# Patient Record
Sex: FEMALE | Race: WHITE | Employment: FULL TIME | URBAN - METROPOLITAN AREA
[De-identification: names, ages, dates, MRNs, and addresses within clinical notes are randomized per-mention and may not be internally consistent; named-entity substitution may affect disease eponyms.]

---

## 2017-02-07 ENCOUNTER — HOSPITAL ENCOUNTER (EMERGENCY)
Facility: CLINIC | Age: 32
Discharge: HOME | End: 2017-02-07
Attending: EMERGENCY MEDICINE | Admitting: EMERGENCY MEDICINE

## 2017-02-07 VITALS
OXYGEN SATURATION: 99 % | DIASTOLIC BLOOD PRESSURE: 60 MMHG | TEMPERATURE: 97.4 F | SYSTOLIC BLOOD PRESSURE: 101 MMHG | RESPIRATION RATE: 16 BRPM | HEART RATE: 59 BPM

## 2017-02-07 DIAGNOSIS — N30.01 ACUTE CYSTITIS WITH HEMATURIA: ICD-10-CM

## 2017-02-07 LAB
ALBUMIN UR-MCNC: 100 MG/DL
APPEARANCE UR: ABNORMAL
BACTERIA #/AREA URNS HPF: ABNORMAL /HPF
BILIRUB UR QL STRIP: NEGATIVE
COLOR UR AUTO: YELLOW
GLUCOSE UR STRIP-MCNC: NEGATIVE MG/DL
HCG UR QL: NEGATIVE
HGB UR QL STRIP: ABNORMAL
KETONES UR STRIP-MCNC: NEGATIVE MG/DL
LEUKOCYTE ESTERASE UR QL STRIP: ABNORMAL
MUCOUS THREADS #/AREA URNS LPF: PRESENT /LPF
NITRATE UR QL: NEGATIVE
PH UR STRIP: 6 PH (ref 5–7)
RBC #/AREA URNS AUTO: >182 /HPF (ref 0–2)
SP GR UR STRIP: 1.03 (ref 1–1.03)
SQUAMOUS #/AREA URNS AUTO: 13 /HPF (ref 0–1)
URN SPEC COLLECT METH UR: ABNORMAL
UROBILINOGEN UR STRIP-MCNC: 2 MG/DL (ref 0–2)
WBC #/AREA URNS AUTO: >182 /HPF (ref 0–2)
WBC CLUMPS #/AREA URNS HPF: PRESENT /HPF

## 2017-02-07 PROCEDURE — 25000132 ZZH RX MED GY IP 250 OP 250 PS 637: Performed by: EMERGENCY MEDICINE

## 2017-02-07 PROCEDURE — 99283 EMERGENCY DEPT VISIT LOW MDM: CPT

## 2017-02-07 PROCEDURE — 81001 URINALYSIS AUTO W/SCOPE: CPT | Performed by: EMERGENCY MEDICINE

## 2017-02-07 PROCEDURE — 81025 URINE PREGNANCY TEST: CPT | Performed by: EMERGENCY MEDICINE

## 2017-02-07 RX ORDER — ACETAMINOPHEN 325 MG/1
650 TABLET ORAL ONCE
Status: COMPLETED | OUTPATIENT
Start: 2017-02-07 | End: 2017-02-07

## 2017-02-07 RX ORDER — NITROFURANTOIN 25; 75 MG/1; MG/1
100 CAPSULE ORAL 2 TIMES DAILY
Qty: 14 CAPSULE | Refills: 0 | Status: SHIPPED | OUTPATIENT
Start: 2017-02-07 | End: 2017-12-21

## 2017-02-07 RX ADMIN — ACETAMINOPHEN 650 MG: 325 TABLET, FILM COATED ORAL at 12:25

## 2017-02-07 ASSESSMENT — ENCOUNTER SYMPTOMS
BACK PAIN: 1
NAUSEA: 1
FEVER: 1
FREQUENCY: 1
HEMATURIA: 1
COUGH: 1
DYSURIA: 1
VOMITING: 0
ABDOMINAL PAIN: 1

## 2017-02-07 NOTE — ED NOTES
ABC's intact.  Alert and oriented x4.    Pt c/o 2 day history of frequent burning urination.  Also c/o back discomfort.

## 2017-02-07 NOTE — ED PROVIDER NOTES
History     Chief Complaint:    Urinary Symptoms, Abdominal Pain    HPI   Ethel Chi is a 32 year old female who presents with urinary symptoms, abdominal pain, and back pain. The patient has had urinary frequency, urgency, dysuria, and hematuria for the past two days. She also has lower abdominal pain and low back pain. She has been nauseous without vomiting and reports fever of 101 at home for which she took Tylenol. She denies diarrhea or constipation. The patient has an IUD in place since three years ago and has had normal menstrual periods which are sometimes a little early/late. She has had a cough for the past two days but denies any other recent sickness. She does not have a history of bladder infections.    Allergies:  No known drug allergies.       Medications:    Ibuprofen      Past Medical History:    Depression  Anemia  Vitamin D deficiency      Past Surgical History:    Laparoscopic cholecystectomy      Family History:    History reviewed. No pertinent family history.     Social History:  Marital Status: single  Presents to the ED with friend  Tobacco Use: No  Alcohol Use: No  PCP: Titi Parker          Review of Systems   Constitutional: Positive for fever.   Respiratory: Positive for cough.    Gastrointestinal: Positive for nausea and abdominal pain. Negative for vomiting.   Genitourinary: Positive for dysuria, urgency, frequency and hematuria.   Musculoskeletal: Positive for back pain.   All other systems reviewed and are negative.      Physical Exam   First Vitals:  BP: (!) 132/94 mmHg  Pulse: 59  Temp: 97.4  F (36.3  C)  Resp: 16  SpO2: 99 %    Physical Exam  VITAL SIGNS: /94 mmHg  Pulse 59  Temp(Src) 97.4  F (36.3  C) (Oral)  Resp 16  SpO2 99%   Constitutional:  Well developed, Well nourished, Completely comfortable appearing.    HENT:  Bilateral external ears normal, Mucous membranes moist, Nose normal. Neck- Normal range of motion, Supple  Respiratory:  Normal breath  sounds, No respiratory distress, No wheezing,  Cardiovascular:  Normal heart rate, Normal rhythm, No murmurs,    GI:  Bowel sounds normal, Soft, Nondistended, no rebound or guarding. No CVA tenderness. No abdominal tenderness to palpation.  Musculoskeletal:  Intact distal pulses, No edema, grossly unremarkable range of motion   Integument:  Warm, Dry   Neurologic:  Alert, attentive and appropriately oriented  Psychiatric:  Mood and affect normal.        Emergency Department Course     Laboratory:  UA: cloudy yellow urine, blood large, protein albumin 100, leukocyte esterase large, WBC >182 (H), RBC >182 (H), WBC clumps present, bacteria few, squamous epithelial 13 (H), mucous urine present, otherwise WNL   HCG Qual: Negative    Interventions:  Acetaminophen 650 mg PO    Emergency Department Course:  Nursing notes and vitals reviewed.  I performed an exam of the patient as documented above.   Urine sample was obtained and sent for laboratory analysis, findings above.  Findings and plan explained to the patient. Patient discharged home with instructions regarding supportive care, medications, and reasons to return. The importance of close follow-up was reviewed. The patient was prescribed Macrobid.      Impression & Plan      Medical Decision Making:  Ethel Chi is a 32 year old female who presents for evaluation of urinary frequency, urgency, dysuria, hematuria and associated abdominal and back pain.  This clinically is consistent with a urinary tract infection.  Urinalysis confirms the infection.  There is no clinical evidence of pyelonephritis, appendicitis, colitis, diverticulitis or any intraabdominal catastrophe. The patient will be started on antibiotics for the infection. Return if increasing pain, vomiting, fever, or inability to tolerate the oral antibiotic.  Follow up with primary physician is indicated if not improving in 2-3 days.      Diagnosis:    ICD-10-CM    1. Acute cystitis with hematuria N30.01         Disposition:  Discharge to home.     Discharge Medications:  New Prescriptions    NITROFURANTOIN, MACROCRYSTAL-MONOHYDRATE, (MACROBID) 100 MG CAPSULE    Take 1 capsule (100 mg) by mouth 2 times daily         Janeth Young  2/7/2017   [facility] EMERGENCY DEPARTMENT    I, Janeth Young, am serving as a scribe on 2/7/2017 at 1:58 PM to personally document services performed by Dr. Mcdonough based on my observations and the provider's statements to me.      Meghann Mcdonough MD  02/07/17 2919

## 2017-02-07 NOTE — ED AVS SNAPSHOT
" [facility] Emergency Department    [address]    [facility] [address]    Phone:  [phone]    Fax:  [phone]                                       Ethel Chi   MRN: [id number]    Department:  [facility] Emergency Department   Date of Visit:  2/7/2017           Patient Information     Date Of Birth          1985        Your diagnoses for this visit were:     Acute cystitis with hematuria        You were seen by Meghann Mcdonough MD.      Follow-up Information     Follow up with Titi Parker MD. Schedule an appointment as soon as possible for a visit in 3 days.    Specialties:  Internal Medicine, Pediatrics    Contact information:    [facility]  [address] [facility] [address]  [phone]          Discharge Instructions       Get extra rest and drink plenty of fluids. You may take acetaminophen (Tylenol) 650mg or ibuprofen (Advil/Motrin) 600mg, up to every 6 hours, with food, for fevers/aches.     If you were prescribed medications for your symptoms you may use them as instructed.    See your primary care clinic for followup within the next 5-7 days or sooner if symptoms are not improving.    If you have any worsening/severe symptoms seek medical care right away.        * BLADDER INFECTION,Female (Adult)    A bladder infection (\"cystitis\" or \"UTI\") usually causes a constant urge to urinate and a burning when passing urine. Urine may be cloudy, smelly or dark. There may be pain in the lower abdomen. A bladder infection occurs when bacteria from the vaginal area enter the bladder opening (urethra). This can occur from sexual intercourse, wearing tight clothing, dehydration and other factors.  HOME CARE:  1. Drink lots of fluids (at least 6-8 glasses a day, unless you must restrict fluids for other medical reasons). This will force the medicine into your urinary system and flush the bacteria out of your body. Cranberry juice has been shown to " help clear out the bacteria.  2. Avoid sexual intercourse until your symptoms are gone.  3. A bladder infection is treated with antibiotics. You may also be given Pyridium (generic = phenazopyridine) to reduce the burning sensation. This medicine will cause your urine to become a bright orange color. The orange urine may stain clothing. You may wear a pad or panty-liner to protect clothing.  PREVENTING FUTURE INFECTIONS:  1. Always wipe from front to back after a bowel movement.  2. Keep the genital area clean and dry.  3. Drink plenty of fluids each day to avoid dehydration.  4. Urinate right after intercourse to flush out the bladder.  5. Wear cotton underwear and cotton-lined panty hose; avoid tight-fitting pants.  6. If you are on birth control pills and are having frequent bladder infections, discuss with your doctor.  FOLLOW UP: Return to this facility or see your doctor if ALL symptoms are not gone after three days of treatment.  GET PROMPT MEDICAL ATTENTION if any of the following occur:    Fever over 101 F (38.3 C)    No improvement by the third day of treatment    Increasing back or abdominal pain    Repeated vomiting; unable to keep medicine down    Weakness, dizziness or fainting    Vaginal discharge    Pain, redness or swelling in the labia (outer vaginal area)    2851-3934 [email or web address], [address] [place] [address], [place] [address]. All rights reserved. This information is not intended as a substitute for professional medical care. Always follow your healthcare professional's instructions.      24 Hour Appointment Hotline       To make an appointment at any [facility], call [phone] ([phone]). If you don't have a family doctor or clinic, we will help you find one. [facility] are conveniently located to serve the needs of you and your family.             Review of your medicines      START taking        Dose / Directions Last dose taken    nitrofurantoin  (macrocrystal-monohydrate) 100 MG capsule   Commonly known as:  MACROBID   Dose:  100 mg   Quantity:  14 capsule        Take 1 capsule (100 mg) by mouth 2 times daily   Refills:  0          Our records show that you are taking the medicines listed below. If these are incorrect, please call your family doctor or clinic.        Dose / Directions Last dose taken    ibuprofen 600 MG tablet   Commonly known as:  ADVIL/MOTRIN   Dose:  600 mg   Quantity:  30 tablet        Take 1 tablet (600 mg) by mouth every 6 hours as needed for moderate pain   Refills:  1                Prescriptions were sent or printed at these locations (1 Prescription)                   Other Prescriptions                Printed at Department/Unit printer (1 of 1)         nitrofurantoin, macrocrystal-monohydrate, (MACROBID) 100 MG capsule                Procedures and tests performed during your visit     HCG qualitative urine    Routine UA with microscopic      Orders Needing Specimen Collection     None      Pending Results     No orders found from 2/6/2017 to 2/8/2017.            Pending Culture Results     No orders found from 2/6/2017 to 2/8/2017.       Test Results from your hospital stay           2/7/2017  2:24 PM - Interface, Flexilab Results      Component Results     Component Value Ref Range & Units Status    HCG Qual Urine Negative NEG Final         2/7/2017  2:27 PM - Interface, Flexilab Results      Component Results     Component Value Ref Range & Units Status    Color Urine Yellow  Final    Appearance Urine Cloudy  Final    Glucose Urine Negative NEG mg/dL Final    Bilirubin Urine Negative NEG Final    Ketones Urine Negative NEG mg/dL Final    Specific Gravity Urine 1.027 1.003 - 1.035 Final    Blood Urine Large (A) NEG Final    pH Urine 6.0 5.0 - 7.0 pH Final    Protein Albumin Urine 100 (A) NEG mg/dL Final    Urobilinogen mg/dL 2.0 0.0 - 2.0 mg/dL Final    Nitrite Urine Negative NEG Final    Leukocyte Esterase Urine Large (A) NEG  Final    Source Midstream Urine  Final    WBC Urine >182 (H) 0 - 2 /HPF Final    RBC Urine >182 (H) 0 - 2 /HPF Final    WBC Clumps Present (A) NEG /HPF Final    Bacteria Urine Few (A) NEG /HPF Final    Squamous Epithelial /HPF Urine 13 (H) 0 - 1 /HPF Final    Mucous Urine Present (A) NEG /LPF Final                Clinical Quality Measure: Blood Pressure Screening     Your blood pressure was checked while you were in the emergency department today. The last reading we obtained was  BP: 102/50 mmHg . Please read the guidelines below about what these numbers mean and what you should do about them.  If your systolic blood pressure (the top number) is less than 120 and your diastolic blood pressure (the bottom number) is less than 80, then your blood pressure is normal. There is nothing more that you need to do about it.  If your systolic blood pressure (the top number) is 120-139 or your diastolic blood pressure (the bottom number) is 80-89, your blood pressure may be higher than it should be. You should have your blood pressure rechecked within a year by a primary care provider.  If your systolic blood pressure (the top number) is 140 or greater or your diastolic blood pressure (the bottom number) is 90 or greater, you may have high blood pressure. High blood pressure is treatable, but if left untreated over time it can put you at risk for heart attack, stroke, or kidney failure. You should have your blood pressure rechecked by a primary care provider within the next 4 weeks.  If your provider in the emergency department today gave you specific instructions to follow-up with your doctor or provider even sooner than that, you should follow that instruction and not wait for up to 4 weeks for your follow-up visit.        Thank you for choosing [facility]       Thank you for choosing [facility] for your care. Our goal is always to provide you with excellent care. Hearing back from our patients is one way we can continue to  "improve our services. Please take a few minutes to complete the written survey that you may receive in the mail after you visit with us. Thank you!        [employer] [facility] Information     [employer] lets you send messages to your doctor, view your test results, renew your prescriptions, schedule appointments and more. To sign up, go to [email or web address] . Click on \"Log in\" on the left side of the screen, which will take you to the Welcome page. Then click on \"Sign up Now\" on the right side of the page.     You will be asked to enter the access code listed below, as well as some personal information. Please follow the directions to create your username and password.     Your access code is: [email or web address]  Expires: 2017  2:35 PM     Your access code will  in 90 days. If you need help or a new code, please call your [facility] clinic or [phone].        Care EveryWhere ID     This is your Care EveryWhere ID. This could be used by other organizations to access your [facility] medical records  [id number]        After Visit Summary       This is your record. Keep this with you and show to your community pharmacist(s) and doctor(s) at your next visit.                  "

## 2017-02-07 NOTE — DISCHARGE INSTRUCTIONS
"Get extra rest and drink plenty of fluids. You may take acetaminophen (Tylenol) 650mg or ibuprofen (Advil/Motrin) 600mg, up to every 6 hours, with food, for fevers/aches.     If you were prescribed medications for your symptoms you may use them as instructed.    See your primary care clinic for followup within the next 5-7 days or sooner if symptoms are not improving.    If you have any worsening/severe symptoms seek medical care right away.        * BLADDER INFECTION,Female (Adult)    A bladder infection (\"cystitis\" or \"UTI\") usually causes a constant urge to urinate and a burning when passing urine. Urine may be cloudy, smelly or dark. There may be pain in the lower abdomen. A bladder infection occurs when bacteria from the vaginal area enter the bladder opening (urethra). This can occur from sexual intercourse, wearing tight clothing, dehydration and other factors.  HOME CARE:  1. Drink lots of fluids (at least 6-8 glasses a day, unless you must restrict fluids for other medical reasons). This will force the medicine into your urinary system and flush the bacteria out of your body. Cranberry juice has been shown to help clear out the bacteria.  2. Avoid sexual intercourse until your symptoms are gone.  3. A bladder infection is treated with antibiotics. You may also be given Pyridium (generic = phenazopyridine) to reduce the burning sensation. This medicine will cause your urine to become a bright orange color. The orange urine may stain clothing. You may wear a pad or panty-liner to protect clothing.  PREVENTING FUTURE INFECTIONS:  1. Always wipe from front to back after a bowel movement.  2. Keep the genital area clean and dry.  3. Drink plenty of fluids each day to avoid dehydration.  4. Urinate right after intercourse to flush out the bladder.  5. Wear cotton underwear and cotton-lined panty hose; avoid tight-fitting pants.  6. If you are on birth control pills and are having frequent bladder infections, " discuss with your doctor.  FOLLOW UP: Return to this facility or see your doctor if ALL symptoms are not gone after three days of treatment.  GET PROMPT MEDICAL ATTENTION if any of the following occur:    Fever over 101 F (38.3 C)    No improvement by the third day of treatment    Increasing back or abdominal pain    Repeated vomiting; unable to keep medicine down    Weakness, dizziness or fainting    Vaginal discharge    Pain, redness or swelling in the labia (outer vaginal area)    5377-3045 [employer], [address] [place] [address], [place] [id number]. All rights reserved. This information is not intended as a substitute for professional medical care. Always follow your healthcare professional's instructions.

## 2017-02-07 NOTE — ED AVS SNAPSHOT
[facility] Emergency Department    [address]    [facility] [address]    Phone:  [phone]    Fax:  [phone]                                       Ethel Chi   MRN: [id number]    Department:  [facility] Emergency Department   Date of Visit:  2/7/2017           After Visit Summary Signature Page     I have received my discharge instructions, and my questions have been answered. I have discussed any challenges I see with this plan with the nurse or doctor.    ..........................................................................................................................................  Patient/Patient Representative Signature      ..........................................................................................................................................  Patient Representative Print Name and Relationship to Patient    ..................................................               ................................................  Date                                            Time    ..........................................................................................................................................  Reviewed by Signature/Title    ...................................................              ..............................................  Date                                                            Time

## 2017-05-26 ENCOUNTER — HOSPITAL ENCOUNTER (EMERGENCY)
Facility: CLINIC | Age: 32
Discharge: HOME | End: 2017-05-26
Attending: EMERGENCY MEDICINE | Admitting: EMERGENCY MEDICINE

## 2017-05-26 VITALS
TEMPERATURE: 98.5 F | WEIGHT: 201.72 LBS | DIASTOLIC BLOOD PRESSURE: 79 MMHG | HEIGHT: 64 IN | OXYGEN SATURATION: 100 % | RESPIRATION RATE: 18 BRPM | BODY MASS INDEX: 34.44 KG/M2 | SYSTOLIC BLOOD PRESSURE: 103 MMHG | HEART RATE: 72 BPM

## 2017-05-26 DIAGNOSIS — J01.00 ACUTE NON-RECURRENT MAXILLARY SINUSITIS: ICD-10-CM

## 2017-05-26 DIAGNOSIS — H92.01 RIGHT EAR PAIN: ICD-10-CM

## 2017-05-26 DIAGNOSIS — H61.23 BILATERAL IMPACTED CERUMEN: ICD-10-CM

## 2017-05-26 PROCEDURE — 69210 REMOVE IMPACTED EAR WAX UNI: CPT

## 2017-05-26 PROCEDURE — 99283 EMERGENCY DEPT VISIT LOW MDM: CPT | Mod: 25

## 2017-05-26 RX ORDER — HYDROCODONE BITARTRATE AND ACETAMINOPHEN 5; 325 MG/1; MG/1
1-2 TABLET ORAL EVERY 4 HOURS PRN
Qty: 15 TABLET | Refills: 0 | Status: SHIPPED | OUTPATIENT
Start: 2017-05-26 | End: 2017-12-21

## 2017-05-26 RX ORDER — IBUPROFEN 600 MG/1
600 TABLET, FILM COATED ORAL ONCE
Status: DISCONTINUED | OUTPATIENT
Start: 2017-05-26 | End: 2017-05-26 | Stop reason: HOSPADM

## 2017-05-26 RX ORDER — OXYMETAZOLINE HYDROCHLORIDE 0.05 G/100ML
2 SPRAY NASAL ONCE
Status: DISCONTINUED | OUTPATIENT
Start: 2017-05-26 | End: 2017-05-26 | Stop reason: HOSPADM

## 2017-05-26 RX ORDER — HYDROCODONE BITARTRATE AND ACETAMINOPHEN 5; 325 MG/1; MG/1
1 TABLET ORAL ONCE
Status: DISCONTINUED | OUTPATIENT
Start: 2017-05-26 | End: 2017-05-26

## 2017-05-26 RX ORDER — ACETAMINOPHEN 500 MG
1000 TABLET ORAL EVERY 4 HOURS PRN
Status: DISCONTINUED | OUTPATIENT
Start: 2017-05-26 | End: 2017-05-26 | Stop reason: HOSPADM

## 2017-05-26 ASSESSMENT — ENCOUNTER SYMPTOMS
FEVER: 1
APPETITE CHANGE: 0
CHILLS: 0
MYALGIAS: 1
COUGH: 0
SINUS PRESSURE: 1

## 2017-05-26 NOTE — ED AVS SNAPSHOT
[facility] Emergency Department    [address]    [facility] [address]    Phone:  [phone]    Fax:  [phone]                                       Ethel Chi   MRN: [id number]    Department:  [facility] Emergency Department   Date of Visit:  5/26/2017           Patient Information     Date Of Birth          1985        Your diagnoses for this visit were:     Acute non-recurrent maxillary sinusitis     Bilateral impacted cerumen     Right ear pain        You were seen by Mario Rivera MD.      Follow-up Information     Follow up with Titi Parker MD In 4 days.    Specialties:  Internal Medicine, Pediatrics    Why:  to recheck for your ear wax    Contact information:    [facility]  [address] [facility] [address]  [phone]          Discharge Instructions       Discharge Instructions  Sinus Infection    You have acute sinusitis, or an infection of the sinuses. The sinuses are the hollow areas within the facial bones that are connected to the nasal opening. The most common cause of acute sinusitis is a virus infection associated with the common cold. Bacterial sinusitis occurs much less commonly, usually as a complication of viral sinusitis. Experts say that most sinusitis is caused by a virus within the first 7-10 days of illness. Antibiotics do nothing to help with virus infections, so most people do not need antibiotics for acute sinusitis.     Return to the Emergency Department if:    Your vision changes.    You are confused or have difficulty thinking clearly.    You have swelling around your eye.    You develop a severe headache or neck stiffness.    You have a fever over 101 degrees.    Your symptoms get worse and you are unable to see your primary doctor.    Follow-up with your doctor:     See your primary doctor in one week if not improving.    Treatment:    Pain relief -- Non-prescription pain medications, such as  "Tylenol  (acetaminophen) or Motrin  or Advil  (ibuprofen) are recommended for pain.  Do not use a medicine that you are allergic to, or if your doctor has told you not to use it.       Nasal irrigation -- Flushing the nose and sinuses with a saline solution several times per day can help to decrease pain caused by congestion.    Nasal decongestants -- Nasal decongestant sprays, including Afrin  (oxymetazoline) and Aston-Synephrine  (phenylephrine) can be used to temporarily treat congestion. However, these sprays should not be used for more than two to three days due to the risk of rebound congestion (when the nose is congested constantly unless the medication is used repeatedly).    Nasal glucocorticoids -- These are prescription steroids delivered by a nasal spray that can help to reduce swelling inside the nose, usually within two to three days. These drugs have few side effects and dramatically relieve symptoms in most people.  If you use these in conjunction with Afrin  you will need to use at least 15 minutes prior to the nasal decongestant.      Do I need an antibiotic? -- Sometimes, but not always, antibiotics are used along with the above treatments.    Antibiotic Warning:     If you have been placed on antibiotics - watch for signs of allergic reaction.  These include rash, lip swelling, difficulty breathing, wheezing, and dizziness.  If you develop any of these symptoms, stop the antibiotic immediately and go to an Emergency Department or Urgent Care for evaluation.    Probiotics: If you have been given an antibiotic, you may want to also take a probiotic pill or eat yogurt with live cultures. Probiotics have \"good bacteria\" to help your intestines stay healthy. Studies have shown that probiotics help prevent diarrhea and other intestine problems (including C. diff infection) when you take antibiotics. You can buy these without a prescription in the pharmacy section of the store.   If you were given a " prescription for medicine here today, be sure to read all of the information (including the package insert) that comes with your prescription.  This will include important information about the medicine, its side effects, and any warnings that you need to know about.  The pharmacist who fills the prescription can provide more information and answer questions you may have about the medicine.  If you have questions or concerns that the pharmacist cannot address, please call or return to the Emergency Department.   Opioid Medication Information    Pain medications are among the most commonly prescribed medicines, so we are including this information for all our patients. If you did not receive pain medication or get a prescription for pain medicine, you can ignore it.     You may have been given a prescription for an opioid (narcotic) pain medicine and/or have received a pain medicine while here in the Emergency Department. These medicines can make you drowsy or impaired. You must not drive, operate dangerous equipment, or engage in any other dangerous activities while taking these medications. If you drive while taking these medications, you could be arrested for DUI, or driving under the influence. Do not drink any alcohol while you are taking these medications.     Opioid pain medications can cause addiction. If you have a history of chemical dependency of any type, you are at a higher risk of becoming addicted to pain medications.  Only take these prescribed medications to treat your pain when all other options have been tried. Take it for as short a time and as few doses as possible. Store your pain pills in a secure place, as they are frequently stolen and provide a dangerous opportunity for children or visitors in your house to start abusing these powerful medications. We will not replace any lost or stolen medicine.  As soon as your pain is better, you should flush all your remaining medication.     Many  prescription pain medications contain Tylenol  (acetaminophen), including Vicodin , Tylenol #3 , Norco , Lortab , and Percocet .  You should not take any extra pills of Tylenol  if you are using these prescription medications or you can get very sick.  Do not ever take more than 3000 mg of acetaminophen in any 24 hour period.    All opioids tend to cause constipation. Drink plenty of water and eat foods that have a lot of fiber, such as fruits, vegetables, prune juice, apple juice and high fiber cereal.  Take a laxative if you don t move your bowels at least every other day. Miralax , Milk of Magnesia, Colace , or Senna  can be used to keep you regular.      Remember that you can always come back to the Emergency Department if you are not able to see your regular doctor in the amount of time listed above, if you get any new symptoms, or if there is anything that worries you.        Earwax Removal    The ear canal makes earwax from the canal s lining. The ears make wax to lubricate and protect the ear canal. The ear canal is the tube that connects the middle ear to the outside of the ear. The wax protects the ear from bacteria, infection, and damage from water or trauma.  The wax that forms in the canal naturally moves toward the outside of the ear and falls out. In some cases, the ear may make too much wax. If the wax causes problems or keeps the healthcare provider from seeing into the ear, the extra wax may be removed.  Too much wax can affect your hearing. It can cause itching. In rare cases, it can be painful. Earwax should not be removed unless it is causing a problem. You should not stick objects into your ear to remove wax unless told to do so by your healthcare provider.  Healthcare providers can remove earwax safely. It is important to stay still during the procedure to avoid damage to the ear canal. But removing earwax generally doesn t hurt. You will not usually need anesthesia or pain medicine when the  provider removes the earwax.  A number of conditions lead to earwax buildup. These include some skin problems, a narrow ear canal, or ears that make too much earwax. Using cotton swabs in the canal pushes earwax deeper into the ear and contributes to the buildup of earwax.  Home care    The healthcare provider may recommend mineral oil or an over-the-counter eardrop to use at home to soften the earwax. Use these products only if the provider recommends them. Use these products only if the provider recommends them. Carefully follow the instructions given.    Don t use mineral oil or OTC eardrops if you might have an ear infection or a ruptured eardrum. Tell your healthcare provider right away if you have diabetes or an immune disorder.    Don t use cotton swabs in your ears. Cotton swabs may push wax deeper into the ear canal or damage the eardrum. Use cotton gauze or a wet washcloth  to gently remove wax on the outside of the ear and around the opening to the ear canal.    Don't use any probing device or object such as cotton-tipped swabs or anish pins to clean the inside of your ears.    Don t use ear candles to clean your ears. Candling can be dangerous. It can burn the ear canal. It can also make the condition worse instead of better.    Don t use cold water to rinse the ear. This will make you dizzy. If your provider tells you to rinse your ear, use only warm water or follow his or her instructions.    Check the ear for signs of infection or irritation listed below under When to seek medical advice.  Steps for using eardrops  1. Warm the medicine bottle by rubbing it between your hands for a few minutes.  2. Lie down on your side, with the affected ear up.  3. Place the recommended number of drops in the ear. Wet a cotton ball with the medicine. Gently put the cotton ball into the ear opening.  Follow-up care  Follow up with your healthcare provider, or as directed.  When to seek medical advice  Call the  provider right away if you have:    Ear pain that gets worse    Fever of 100.4F F (38 C) or higher, or as directed by your healthcare provider    Worsening wax buildup    Severe pain, dizziness, or nausea    Bleeding from the ear    Hearing problems    Signs of irritation from the eardrops, such as burning, stinging, or swelling and tenderness    Foul-smelling fluid draining from the ear    Swelling, redness, or tenderness of the outer ear    Headache, neck pain, or stiff neck    1128-4630 [employer]. [address] [place] [address]. All rights reserved. This information is not intended as a substitute for professional medical care. Always follow your healthcare professional's instructions.          24 Hour Appointment Hotline       To make an appointment at any [facility], call [phone] ([phone]). If you don't have a family doctor or clinic, we will help you find one. [facility] clinics are conveniently located to serve the needs of you and your family.             Review of your medicines      START taking        Dose / Directions Last dose taken    amoxicillin-clavulanate 875-125 MG per tablet   Commonly known as:  AUGMENTIN   Dose:  1 tablet   Quantity:  28 tablet        Take 1 tablet by mouth 2 times daily for 14 days   Refills:  0        HYDROcodone-acetaminophen 5-325 MG per tablet   Commonly known as:  NORCO   Dose:  1-2 tablet   Quantity:  15 tablet        Take 1-2 tablets by mouth every 4 hours as needed for moderate to severe pain   Refills:  0          Our records show that you are taking the medicines listed below. If these are incorrect, please call your family doctor or clinic.        Dose / Directions Last dose taken    ibuprofen 600 MG tablet   Commonly known as:  ADVIL/MOTRIN   Dose:  600 mg   Quantity:  30 tablet        Take 1 tablet (600 mg) by mouth every 6 hours as needed for moderate pain   Refills:  1        nitrofurantoin (macrocrystal-monohydrate)  100 MG capsule   Commonly known as:  MACROBID   Dose:  100 mg   Quantity:  14 capsule        Take 1 capsule (100 mg) by mouth 2 times daily   Refills:  0                Prescriptions were sent or printed at these locations (2 Prescriptions)                   Other Prescriptions                Printed at Department/Unit printer (2 of 2)         amoxicillin-clavulanate (AUGMENTIN) 875-125 MG per tablet               HYDROcodone-acetaminophen (NORCO) 5-325 MG per tablet                Orders Needing Specimen Collection     None      Pending Results     No orders found from 5/24/2017 to 5/27/2017.            Pending Culture Results     No orders found from 5/24/2017 to 5/27/2017.            Pending Results Instructions     If you had any lab results that were not finalized at the time of your Discharge, you can call the ED Lab Result RN at [phone]. You will be contacted by this team for any positive Lab results or changes in treatment. The nurses are available 7 days a week from 10A to 6:30P.  You can leave a message 24 hours per day and they will return your call.        Test Results From Your Hospital Stay               Clinical Quality Measure: Blood Pressure Screening     Your blood pressure was checked while you were in the emergency department today. The last reading we obtained was  BP: 103/79 . Please read the guidelines below about what these numbers mean and what you should do about them.  If your systolic blood pressure (the top number) is less than 120 and your diastolic blood pressure (the bottom number) is less than 80, then your blood pressure is normal. There is nothing more that you need to do about it.  If your systolic blood pressure (the top number) is 120-139 or your diastolic blood pressure (the bottom number) is 80-89, your blood pressure may be higher than it should be. You should have your blood pressure rechecked within a year by a primary care provider.  If your systolic blood pressure  "(the top number) is 140 or greater or your diastolic blood pressure (the bottom number) is 90 or greater, you may have high blood pressure. High blood pressure is treatable, but if left untreated over time it can put you at risk for heart attack, stroke, or kidney failure. You should have your blood pressure rechecked by a primary care provider within the next 4 weeks.  If your provider in the emergency department today gave you specific instructions to follow-up with your doctor or provider even sooner than that, you should follow that instruction and not wait for up to 4 weeks for your follow-up visit.        Thank you for choosing [facility]       Thank you for choosing [facility] for your care. Our goal is always to provide you with excellent care. Hearing back from our patients is one way we can continue to improve our services. Please take a few minutes to complete the written survey that you may receive in the mail after you visit with us. Thank you!        [employer] Information     [employer] lets you send messages to your doctor, view your test results, renew your prescriptions, schedule appointments and more. To sign up, go to [email or web address] . Click on \"Log in\" on the left side of the screen, which will take you to the Welcome page. Then click on \"Sign up Now\" on the right side of the page.     You will be asked to enter the access code listed below, as well as some personal information. Please follow the directions to create your username and password.     Your access code is: [email or web address]  Expires: 2017  6:08 PM     Your access code will  in 90 days. If you need help or a new code, please call your [facility] clinic or [phone].        Care EveryWhere ID     This is your Care EveryWhere ID. This could be used by other organizations to access your [facility] medical records  [id number]        After Visit Summary       This is your record. Keep this with you and show to your community " pharmacist(s) and doctor(s) at your next visit.

## 2017-05-26 NOTE — ED NOTES
Pt is having right sided facial pain. Pt states pain is worsened with pressing on her right cheek. Pt c/o right ear, eye, tooth pain.

## 2017-05-26 NOTE — ED NOTES
Patient frustrated and angry about the time taken to have care provided during this stay. Attempted to provide support and discuss current plan and current discharge plan. Patient declines going over possible pain medications available here and would like to go home. Patient meets discharge criteria. Patient declined going over AVS. Patient prescribed norco and antibiotic. Attempted to discuss medication precautions and administration. Patient declined. Patient reports being ready to go home. Patient discharged home with family by car.

## 2017-05-26 NOTE — DISCHARGE INSTRUCTIONS
Discharge Instructions  Sinus Infection    You have acute sinusitis, or an infection of the sinuses. The sinuses are the hollow areas within the facial bones that are connected to the nasal opening. The most common cause of acute sinusitis is a virus infection associated with the common cold. Bacterial sinusitis occurs much less commonly, usually as a complication of viral sinusitis. Experts say that most sinusitis is caused by a virus within the first 7-10 days of illness. Antibiotics do nothing to help with virus infections, so most people do not need antibiotics for acute sinusitis.     Return to the Emergency Department if:    Your vision changes.    You are confused or have difficulty thinking clearly.    You have swelling around your eye.    You develop a severe headache or neck stiffness.    You have a fever over 101 degrees.    Your symptoms get worse and you are unable to see your primary doctor.    Follow-up with your doctor:     See your primary doctor in one week if not improving.    Treatment:    Pain relief -- Non-prescription pain medications, such as Tylenol  (acetaminophen) or Motrin  or Advil  (ibuprofen) are recommended for pain.  Do not use a medicine that you are allergic to, or if your doctor has told you not to use it.       Nasal irrigation -- Flushing the nose and sinuses with a saline solution several times per day can help to decrease pain caused by congestion.    Nasal decongestants -- Nasal decongestant sprays, including Afrin  (oxymetazoline) and Aston-Synephrine  (phenylephrine) can be used to temporarily treat congestion. However, these sprays should not be used for more than two to three days due to the risk of rebound congestion (when the nose is congested constantly unless the medication is used repeatedly).    Nasal glucocorticoids -- These are prescription steroids delivered by a nasal spray that can help to reduce swelling inside the nose, usually within two to three days. These  "drugs have few side effects and dramatically relieve symptoms in most people.  If you use these in conjunction with Afrin  you will need to use at least 15 minutes prior to the nasal decongestant.      Do I need an antibiotic? -- Sometimes, but not always, antibiotics are used along with the above treatments.    Antibiotic Warning:     If you have been placed on antibiotics - watch for signs of allergic reaction.  These include rash, lip swelling, difficulty breathing, wheezing, and dizziness.  If you develop any of these symptoms, stop the antibiotic immediately and go to an Emergency Department or Urgent Care for evaluation.    Probiotics: If you have been given an antibiotic, you may want to also take a probiotic pill or eat yogurt with live cultures. Probiotics have \"good bacteria\" to help your intestines stay healthy. Studies have shown that probiotics help prevent diarrhea and other intestine problems (including C. diff infection) when you take antibiotics. You can buy these without a prescription in the pharmacy section of the store.   If you were given a prescription for medicine here today, be sure to read all of the information (including the package insert) that comes with your prescription.  This will include important information about the medicine, its side effects, and any warnings that you need to know about.  The pharmacist who fills the prescription can provide more information and answer questions you may have about the medicine.  If you have questions or concerns that the pharmacist cannot address, please call or return to the Emergency Department.   Opioid Medication Information    Pain medications are among the most commonly prescribed medicines, so we are including this information for all our patients. If you did not receive pain medication or get a prescription for pain medicine, you can ignore it.     You may have been given a prescription for an opioid (narcotic) pain medicine and/or have " received a pain medicine while here in the Emergency Department. These medicines can make you drowsy or impaired. You must not drive, operate dangerous equipment, or engage in any other dangerous activities while taking these medications. If you drive while taking these medications, you could be arrested for DUI, or driving under the influence. Do not drink any alcohol while you are taking these medications.     Opioid pain medications can cause addiction. If you have a history of chemical dependency of any type, you are at a higher risk of becoming addicted to pain medications.  Only take these prescribed medications to treat your pain when all other options have been tried. Take it for as short a time and as few doses as possible. Store your pain pills in a secure place, as they are frequently stolen and provide a dangerous opportunity for children or visitors in your house to start abusing these powerful medications. We will not replace any lost or stolen medicine.  As soon as your pain is better, you should flush all your remaining medication.     Many prescription pain medications contain Tylenol  (acetaminophen), including Vicodin , Tylenol #3 , Norco , Lortab , and Percocet .  You should not take any extra pills of Tylenol  if you are using these prescription medications or you can get very sick.  Do not ever take more than 3000 mg of acetaminophen in any 24 hour period.    All opioids tend to cause constipation. Drink plenty of water and eat foods that have a lot of fiber, such as fruits, vegetables, prune juice, apple juice and high fiber cereal.  Take a laxative if you don t move your bowels at least every other day. Miralax , Milk of Magnesia, Colace , or Senna  can be used to keep you regular.      Remember that you can always come back to the Emergency Department if you are not able to see your regular doctor in the amount of time listed above, if you get any new symptoms, or if there is anything that  worries you.        Earwax Removal    The ear canal makes earwax from the canal s lining. The ears make wax to lubricate and protect the ear canal. The ear canal is the tube that connects the middle ear to the outside of the ear. The wax protects the ear from bacteria, infection, and damage from water or trauma.  The wax that forms in the canal naturally moves toward the outside of the ear and falls out. In some cases, the ear may make too much wax. If the wax causes problems or keeps the healthcare provider from seeing into the ear, the extra wax may be removed.  Too much wax can affect your hearing. It can cause itching. In rare cases, it can be painful. Earwax should not be removed unless it is causing a problem. You should not stick objects into your ear to remove wax unless told to do so by your healthcare provider.  Healthcare providers can remove earwax safely. It is important to stay still during the procedure to avoid damage to the ear canal. But removing earwax generally doesn t hurt. You will not usually need anesthesia or pain medicine when the provider removes the earwax.  A number of conditions lead to earwax buildup. These include some skin problems, a narrow ear canal, or ears that make too much earwax. Using cotton swabs in the canal pushes earwax deeper into the ear and contributes to the buildup of earwax.  Home care    The healthcare provider may recommend mineral oil or an over-the-counter eardrop to use at home to soften the earwax. Use these products only if the provider recommends them. Use these products only if the provider recommends them. Carefully follow the instructions given.    Don t use mineral oil or OTC eardrops if you might have an ear infection or a ruptured eardrum. Tell your healthcare provider right away if you have diabetes or an immune disorder.    Don t use cotton swabs in your ears. Cotton swabs may push wax deeper into the ear canal or damage the eardrum. Use cotton gauze  or a wet washcloth  to gently remove wax on the outside of the ear and around the opening to the ear canal.    Don't use any probing device or object such as cotton-tipped swabs or anish pins to clean the inside of your ears.    Don t use ear candles to clean your ears. Candling can be dangerous. It can burn the ear canal. It can also make the condition worse instead of better.    Don t use cold water to rinse the ear. This will make you dizzy. If your provider tells you to rinse your ear, use only warm water or follow his or her instructions.    Check the ear for signs of infection or irritation listed below under When to seek medical advice.  Steps for using eardrops  1. Warm the medicine bottle by rubbing it between your hands for a few minutes.  2. Lie down on your side, with the affected ear up.  3. Place the recommended number of drops in the ear. Wet a cotton ball with the medicine. Gently put the cotton ball into the ear opening.  Follow-up care  Follow up with your healthcare provider, or as directed.  When to seek medical advice  Call the provider right away if you have:    Ear pain that gets worse    Fever of 100.4F F (38 C) or higher, or as directed by your healthcare provider    Worsening wax buildup    Severe pain, dizziness, or nausea    Bleeding from the ear    Hearing problems    Signs of irritation from the eardrops, such as burning, stinging, or swelling and tenderness    Foul-smelling fluid draining from the ear    Swelling, redness, or tenderness of the outer ear    Headache, neck pain, or stiff neck    8934-4093 [employer]. [address] [place] [address], [place] [address]. All rights reserved. This information is not intended as a substitute for professional medical care. Always follow your healthcare professional's instructions.

## 2017-05-26 NOTE — ED AVS SNAPSHOT
[facility]    [address]    [facility] [address]    Phone:  [phone]    Fax:  [phone]                                       Ethel Chi   MRN: [id number]    Department:  [facility]   Date of Visit:  5/26/2017           After Visit Summary Signature Page     I have received my discharge instructions, and my questions have been answered. I have discussed any challenges I see with this plan with the nurse or doctor.    ..........................................................................................................................................  Patient/Patient Representative Signature      ..........................................................................................................................................  Patient Representative Print Name and Relationship to Patient    ..................................................               ................................................  Date                                            Time    ..........................................................................................................................................  Reviewed by Signature/Title    ...................................................              ..............................................  Date                                                            Time

## 2017-05-26 NOTE — ED PROVIDER NOTES
"  History     Chief Complaint:  Facial Pain and Cold Symptoms    HPI   Ethel Chi is a 32 year old female who presents with facial pain covering the right side of her head including the forehead, cheek, jaw, eye, and ear. She has been struggling with a cold for the last three days, but this pain started in the last 24 hours. She was unable to sleep last night due to the pain, which worsens with palpation. She took some tylenol for the pain, but no other analgesics. She also reports a fever and known sick contacts. However in the ED she is afebrile. The patient has no other concerns at this time.     Allergies:  No known drug allergies.    Medications:    Macrobid 100 mg capsule     Past Medical History:    Depression    Past Surgical History:    Laparoscopic cholecystectomy.     Family History:    History reviewed. No pertinent family history.    Social History:  Presents to the Emergency Department with her daughter.  Marital Status:  Single  Smoking status: Never smoker  Alcohol use: No    Review of Systems   Constitutional: Positive for fever. Negative for appetite change and chills.   HENT: Positive for congestion, ear pain and sinus pressure.    Respiratory: Negative for cough.    Musculoskeletal: Positive for myalgias (Right sided facial pain).   All other systems reviewed and are negative.    Physical Exam     Patient Vitals for the past 24 hrs:   BP Temp Temp src Pulse Resp SpO2 Height Weight   05/26/17 1548 103/79 98.5  F (36.9  C) Oral 72 18 100 % 1.626 m (5' 4\") 91.5 kg (201 lb 11.5 oz)       Physical Exam  Constitutional:  Appears well-developed and well-nourished. Alert. Conversant. Non toxic.  HENT:   Head: Atraumatic.   Nose: Nose normal.  Right ear: Pinna, mastoid are normal.  Canals occluded by sticky yellow cerumen.  I was able to remove a large amount of this with lighted ear curet.  Even after attempts with lighted curet and irrigation and still not able to see her TM.  There is no purulent " drainage or bleeding in the ear canal after attempts.    Left ear: Pinna and mastoid are normal.  Canal is occluded by sticky yellow cerumen.  I attempted to remove this with lighted ear curet.  I was able to move a large chunk of serum and but still unable to see her canal.  I asked the nurses to irrigate but they were unfortunately delayed.    Mouth/Throat: Oral mucosa is clear and moist. no trismus. Pharynx normal. Tonsils symmetric. No tonsillar enlargement, erythema, or exudate. marked right maxillary sinus tenderness.  No purulent drainage from her nose.    Eyes: Conjunctivae normal. EOM normal. Pupils equal, round, and reactive to light. No scleral icterus.   Neck: Normal range of motion. Neck supple. No tracheal deviation present.   Cardiovascular: Normal rate, regular rhythm. No gallop. No friction rub. No murmur heard. Symmetric radial artery pulses   Pulmonary/Chest: Effort normal. No stridor. No respiratory distress. No wheezes. No rales. No rhonchi . No te  Musculoskeletal: Normal range of motion. No edema. No tenderness. No deformity   Lymph: No cervical adenopathy.   Neurological: Alert and oriented to person, place, and time. Normal strength. CN II-VII intact. No sensory deficit. GCS eye subscore is 4. GCS verbal subscore is 5. GCS motor subscore is 6. Normal coordination   Skin: Skin is warm and dry. No rash noted. No pallor. Normal capillary refill.  Psychiatric:  Normal mood. Normal affect.     Emergency Department Course     Emergency Department Course:  Nursing notes and vitals reviewed.  I performed an exam of the patient as documented above.   1750: Patient rechecked and updated.   I discussed the treatment plan with the patient. They expressed understanding of this plan and consented to discharge. They will be discharged home with instructions for care and follow up. In addition, the patient will return to the emergency department if their symptoms persist, worsen, if new symptoms arise or if  there is any concern.  All questions were answered.    Impression & Plan      Medical Decision Making:  The patient presented with signs and symptoms of sinusitis, including a recent URI with nasal congestion, now with significant right facial pain radiating to her right ear.  Based on history and exam, I feel the cause of sinusitis is most likely bacterial.  Antibiotics are indicated due to severity of symptoms at the onset of illness.  Evaluation today did not show signs of intracranial complication of sinusitis, facial cellulitis or fungal sinusitis.  She also has a right ear pain.  She has a history of serum and over production and does have bilateral cerumen impaction here.  I carefully tried to remove the cerumen from both ear canals and was able to get a large amount from both sides.  Still both of her TMs are occluded.  I asked the nurses to irrigate her ear canals since I felt that the cerumen was too deep and to close to her TM to be safely removed by curette comfortably.  They were able to irrigate her right ear.  I again attempted to remove the cerumen but still can't see her TM.  The patient waited here in the ER for almost an hour but the nurses could not get back to irrigate her left ear due to acuity of other patients.  The patient is now frustrated and wants to leave the ER.  I discussed with her that we can start her on a box for her sinuses which would likely also cover potential ear infection.  At this time I don't see evidence for otitis externa.  We'll have her follow-up with her doctors for ongoing cerumen irrigation.    The patient also wanted an x-ray of her face to check for sinusitis.  I discussed with her that x-rays in this case would not be indicated and not give any additional diagnostic information.  She is not really complaining of a diffuse headache or other neck stiffness to suggest meningitis.  No abrupt onset of her facial pain to suggest SAH, cervical artery dissection or other  acute cranial catastrophe.  At this point I would recommend against CT imaging due to the risk of radiation at her young age.  The patient was given instructions to follow up with primary care in 3-5 days.  Instructions for symptomatic care were given.     Diagnosis:    ICD-10-CM    1. Acute non-recurrent maxillary sinusitis J01.00    2. Bilateral impacted cerumen H61.23    3. Right ear pain H92.01      Disposition:   Discharged to home with the below prescriptions    Discharge Medications:  New Prescriptions    AMOXICILLIN-CLAVULANATE (AUGMENTIN) 875-125 MG PER TABLET    Take 1 tablet by mouth 2 times daily for 14 days    HYDROCODONE-ACETAMINOPHEN (NORCO) 5-325 MG PER TABLET    Take 1-2 tablets by mouth every 4 hours as needed for moderate to severe pain       Scribe Disclosure:  GAVIOTA, Kim Cooper, am serving as a scribe at 4:17 PM on 5/26/2017 to document services personally performed by Mario Rivera MD, based on my observations and the provider's statements to me.    5/26/2017   [facility] EMERGENCY DEPARTMENT       Mario Rivera MD  05/27/17 0706

## 2017-08-05 ENCOUNTER — HEALTH MAINTENANCE LETTER (OUTPATIENT)
Age: 32
End: 2017-08-05

## 2017-12-21 ENCOUNTER — OFFICE VISIT (OUTPATIENT)
Dept: INTERNAL MEDICINE | Facility: CLINIC | Age: 32
End: 2017-12-21
Payer: COMMERCIAL

## 2017-12-21 VITALS
SYSTOLIC BLOOD PRESSURE: 102 MMHG | HEIGHT: 64 IN | BODY MASS INDEX: 33.97 KG/M2 | OXYGEN SATURATION: 98 % | TEMPERATURE: 98 F | DIASTOLIC BLOOD PRESSURE: 60 MMHG | HEART RATE: 62 BPM | WEIGHT: 199 LBS

## 2017-12-21 DIAGNOSIS — R39.9 LOWER URINARY TRACT SYMPTOMS (LUTS): Primary | ICD-10-CM

## 2017-12-21 DIAGNOSIS — R51.9 FREQUENT HEADACHES: ICD-10-CM

## 2017-12-21 DIAGNOSIS — N89.8 VAGINAL DISCHARGE: ICD-10-CM

## 2017-12-21 DIAGNOSIS — R04.0 FREQUENT NOSEBLEEDS: ICD-10-CM

## 2017-12-21 LAB
ALBUMIN SERPL-MCNC: 3.4 G/DL (ref 3.4–5)
ALBUMIN UR-MCNC: NEGATIVE MG/DL
ALP SERPL-CCNC: 81 U/L (ref 40–150)
ALT SERPL W P-5'-P-CCNC: 18 U/L (ref 0–50)
ANION GAP SERPL CALCULATED.3IONS-SCNC: 6 MMOL/L (ref 3–14)
APPEARANCE UR: ABNORMAL
AST SERPL W P-5'-P-CCNC: 13 U/L (ref 0–45)
BACTERIA #/AREA URNS HPF: ABNORMAL /HPF
BILIRUB SERPL-MCNC: 0.2 MG/DL (ref 0.2–1.3)
BILIRUB UR QL STRIP: NEGATIVE
BUN SERPL-MCNC: 6 MG/DL (ref 7–30)
CALCIUM SERPL-MCNC: 8.9 MG/DL (ref 8.5–10.1)
CHLORIDE SERPL-SCNC: 106 MMOL/L (ref 94–109)
CO2 SERPL-SCNC: 27 MMOL/L (ref 20–32)
COLOR UR AUTO: YELLOW
CREAT SERPL-MCNC: 0.46 MG/DL (ref 0.52–1.04)
ERYTHROCYTE [DISTWIDTH] IN BLOOD BY AUTOMATED COUNT: 14.2 % (ref 10–15)
FERRITIN SERPL-MCNC: 25 NG/ML (ref 12–150)
GFR SERPL CREATININE-BSD FRML MDRD: >90 ML/MIN/1.7M2
GLUCOSE SERPL-MCNC: 91 MG/DL (ref 70–99)
GLUCOSE UR STRIP-MCNC: NEGATIVE MG/DL
HCT VFR BLD AUTO: 42.4 % (ref 35–47)
HGB BLD-MCNC: 13.4 G/DL (ref 11.7–15.7)
HGB UR QL STRIP: NEGATIVE
INR PPP: 0.97 (ref 0.86–1.14)
IRON SATN MFR SERPL: 12 % (ref 15–46)
IRON SERPL-MCNC: 35 UG/DL (ref 35–180)
KETONES UR STRIP-MCNC: NEGATIVE MG/DL
LEUKOCYTE ESTERASE UR QL STRIP: ABNORMAL
MCH RBC QN AUTO: 30 PG (ref 26.5–33)
MCHC RBC AUTO-ENTMCNC: 31.6 G/DL (ref 31.5–36.5)
MCV RBC AUTO: 95 FL (ref 78–100)
NITRATE UR QL: NEGATIVE
NON-SQ EPI CELLS #/AREA URNS LPF: ABNORMAL /LPF
PH UR STRIP: 6 PH (ref 5–7)
PLATELET # BLD AUTO: 254 10E9/L (ref 150–450)
POTASSIUM SERPL-SCNC: 3.8 MMOL/L (ref 3.4–5.3)
PROT SERPL-MCNC: 7.8 G/DL (ref 6.8–8.8)
RBC # BLD AUTO: 4.47 10E12/L (ref 3.8–5.2)
RBC #/AREA URNS AUTO: ABNORMAL /HPF
SODIUM SERPL-SCNC: 139 MMOL/L (ref 133–144)
SOURCE: ABNORMAL
SP GR UR STRIP: 1.02 (ref 1–1.03)
SPECIMEN SOURCE: NORMAL
TIBC SERPL-MCNC: 282 UG/DL (ref 240–430)
UROBILINOGEN UR STRIP-ACNC: 0.2 EU/DL (ref 0.2–1)
WBC # BLD AUTO: 10.7 10E9/L (ref 4–11)
WBC #/AREA URNS AUTO: ABNORMAL /HPF
WET PREP SPEC: NORMAL

## 2017-12-21 PROCEDURE — 81001 URINALYSIS AUTO W/SCOPE: CPT | Performed by: INTERNAL MEDICINE

## 2017-12-21 PROCEDURE — 85610 PROTHROMBIN TIME: CPT | Performed by: INTERNAL MEDICINE

## 2017-12-21 PROCEDURE — 36415 COLL VENOUS BLD VENIPUNCTURE: CPT | Performed by: INTERNAL MEDICINE

## 2017-12-21 PROCEDURE — 80053 COMPREHEN METABOLIC PANEL: CPT | Performed by: INTERNAL MEDICINE

## 2017-12-21 PROCEDURE — 99214 OFFICE O/P EST MOD 30 MIN: CPT | Performed by: INTERNAL MEDICINE

## 2017-12-21 PROCEDURE — 82306 VITAMIN D 25 HYDROXY: CPT | Performed by: INTERNAL MEDICINE

## 2017-12-21 PROCEDURE — 82728 ASSAY OF FERRITIN: CPT | Performed by: INTERNAL MEDICINE

## 2017-12-21 PROCEDURE — 87491 CHLMYD TRACH DNA AMP PROBE: CPT | Performed by: INTERNAL MEDICINE

## 2017-12-21 PROCEDURE — 87591 N.GONORRHOEAE DNA AMP PROB: CPT | Performed by: INTERNAL MEDICINE

## 2017-12-21 PROCEDURE — 87210 SMEAR WET MOUNT SALINE/INK: CPT | Performed by: INTERNAL MEDICINE

## 2017-12-21 PROCEDURE — 83540 ASSAY OF IRON: CPT | Performed by: INTERNAL MEDICINE

## 2017-12-21 PROCEDURE — 83550 IRON BINDING TEST: CPT | Performed by: INTERNAL MEDICINE

## 2017-12-21 PROCEDURE — 85027 COMPLETE CBC AUTOMATED: CPT | Performed by: INTERNAL MEDICINE

## 2017-12-21 PROCEDURE — 87086 URINE CULTURE/COLONY COUNT: CPT | Performed by: INTERNAL MEDICINE

## 2017-12-21 NOTE — PROGRESS NOTES
"  SUBJECTIVE:                                                      HPI: Ethel Chi is a 32 year old female who presents with multiple concerns:    Japanese  utilized.    History is challenging - patient is either dismissive of questions or will not answer them unless repeated.    Urinary symptoms:  - ongoing for 3-4 days  - symptoms include urinary frequency, urgency, and dysuria; no hematuria  - associated chills, but no subjective fevers or sweats    - no abdominal, pelvic, or flank pain  - no nausea or vomiting    ---    Abnormal vaginal discharge:  - excess watery discharge  - ongoing for 1 week  - associated foul odor    - no vaginal irritation or itching  - not sexually active  - no recent antibiotic use    ---    Frequent headaches:  - ongoing for the last year  - occur with unclear frequency    - patient unable to say if headaches occur daily, weekly, monthly, regularly, or infrequently  - duration of headaches widely variable - minutes to days  - right-sided only  - stabbing and heavy in quality  - associated photo and phonophobia  - worse with \"thinking too much\"  - mild improvement with Tylenol and ibuprofen use    - no nausea or vomiting    ---    Frequent nosebleeds:  - ongoing for the last year  - occur with unclear frequency - patient endorses both infrequently and 100s of times this past year  - right nostril only  - patient is able to stop bleeding after several minutes of pressure application    - no bleeding diatheses elsewhere  ---    No active medical problems other than obesity.    Not on any medications other than Tylenol and ibuprofen as needed.    ---      The medication, allergy, and problem lists have been reviewed and updated as appropriate.       OBJECTIVE:                                                      /60 (BP Location: Left arm, Patient Position: Chair, Cuff Size: Adult Regular)  Pulse 62  Temp 98  F (36.7  C) (Oral)  Ht 5' 4\" (1.626 m)  Wt 199 lb (90.3 kg) "  LMP 11/13/2017 (Approximate)  SpO2 98%  BMI 34.16 kg/m2  Constitutional: well-appearing  Nasal exam: piercing right nostril; otherwise normal exam  Genitourinary: external genitalia, urethral meatus, and vagina normal - no abnormal discharge noted  Psych: normal judgment and insight; normal mood and affect; recent and remote memory intact      ASSESSMENT/PLAN:                                                      (R39.9) Lower urinary tract symptoms (LUTS)  (primary encounter diagnosis)  Comment: possible UTI.  Plan: UA reflex today.    (N89.8) Vaginal discharge  Comment: normal exam.  Plan: swabs sent for wet prep and GC/C.    (R51) Frequent headaches  Comment:    - etiology unclear.   - differential includes migraines, tension headaches, cluster headaches, and eye strain.   - differential also includes anemia, iron deficiency, electrolyte disorder, and vitamin D deficiency.  Plan: CBC, iron studies, CMP, and vitamin D level today.    (R04.0) Frequent nosebleeds  Comment: right side only.  Plan:    - INR today.   - referred to ENT for further evaluation - patient to schedule.    The instructions on the AVS were discussed and explained to the patient. Patient expressed understanding of instructions.    A total of 30 minutes were spent face-to-face with this patient during this encounter and over half of that time was spent on counseling and coordination of care re: above diagnoses and plans of care.     (Chart documentation was completed, in part, with [employer] voice-recognition software. Even though reviewed, some grammatical, spelling, and word errors may remain.)    Estela Hernandez MD   [address] [address]  T: [phone], F: [phone]

## 2017-12-21 NOTE — MR AVS SNAPSHOT
After Visit Summary   12/21/2017    Ethel Chi    MRN: [id number]           Patient Information     Date Of Birth          1985        Visit Information        Provider Department      12/21/2017 1:45 PM Estela Hernandez MD; [facility] [facility]        Today's Diagnoses     Frequent headaches    -  1    Vaginal discharge        Frequent nosebleeds        Lower urinary tract symptoms (LUTS)          Care Instructions    Urine sample today.    ---    Labs downstairs today.    ---    Please schedule appointment with ENT for further evaluation of frequent nosebleeds.           Follow-ups after your visit        Additional Services     OTOLARYNGOLOGY REFERRAL       Your provider has referred you to: N: [facility] [place] [phone]   [email or web address]    Please be aware that coverage of these services is subject to the terms and limitations of your health insurance plan.  Call member services at your health plan with any benefit or coverage questions.      Please bring the following with you to your appointment:    (1) Any X-Rays, CTs or MRIs which have been performed.  Contact the facility where they were done to arrange for  prior to your scheduled appointment.   (2) List of current medications  (3) This referral request   (4) Any documents/labs given to you for this referral                  Who to contact     If you have questions or need follow up information about today's clinic visit or your schedule please contact [facility] directly at [phone].  Normal or non-critical lab and imaging results will be communicated to you by MyChart, letter or phone within 4 business days after the clinic has received the results. If you do not hear from us within 7 days, please contact the clinic through MyChart or phone. If you have a critical or abnormal lab result, we will  "notify you by phone as soon as possible.  Submit refill requests through [employer] or call your pharmacy and they will forward the refill request to us. Please allow 3 business days for your refill to be completed.          Additional Information About Your Visit        Claim Mapshart Information     Aud[employer]ocket lets you send messages to your doctor, view your test results, renew your prescriptions, schedule appointments and more. To sign up, go to [email or web address] . Click on \"Log in\" on the left side of the screen, which will take you to the Welcome page. Then click on \"Sign up Now\" on the right side of the page.     You will be asked to enter the access code listed below, as well as some personal information. Please follow the directions to create your username and password.     Your access code is: [email or web address]  Expires: 2018  2:18 PM     Your access code will  in 90 days. If you need help or a new code, please call your [place] clinic or [phone].        Care EveryWhere ID     This is your Care EveryWhere ID. This could be used by other organizations to access your [place] medical records  [id number]        Your Vitals Were     Pulse Temperature Height Last Period Pulse Oximetry BMI (Body Mass Index)    62 98  F (36.7  C) (Oral) 5' 4\" (1.626 m) 2017 (Approximate) 98% 34.16 kg/m2       Blood Pressure from Last 3 Encounters:   17 102/60   17 103/79   17 101/60    Weight from Last 3 Encounters:   17 199 lb (90.3 kg)   17 201 lb 11.5 oz (91.5 kg)   16 199 lb (90.3 kg)              We Performed the Following     CBC with platelets     CHLAMYDIA TRACHOMATIS PCR     Comprehensive metabolic panel     Ferritin     INR     Iron and iron binding capacity     NEISSERIA GONORRHOEA PCR     OTOLARYNGOLOGY REFERRAL     UA reflex to Microscopic and Culture     Vitamin D Deficiency     Wet prep        Primary Care Provider Office Phone # Fax #    Adeefrain Parker MD " [phone]       XXX RESIGNED XXX  [facility] [address]        Equal Access to Services     [facility] : Hadii aad ku hadkalani Chauhan, wasanada luqadaha, jimbota kaalmada kari, trisha thomson laAdonislucius guerra. So [facility] [phone].    ATENCIÓN: Si habla español, tiene a veloz disposición servicios gratuitos de asistencia lingüística. Llame al [phone].    We comply with applicable federal civil rights laws and [place] laws. We do not discriminate on the basis of race, color, national origin, age, disability, sex, sexual orientation, or gender identity.            Thank you!     Thank you for choosing [facility]  for your care. Our goal is always to provide you with excellent care. Hearing back from our patients is one way we can continue to improve our services. Please take a few minutes to complete the written survey that you may receive in the mail after your visit with us. Thank you!             Your Updated Medication List - Protect others around you: Learn how to safely use, store and throw away your medicines at www.disposemymeds.org.      Notice  As of 12/21/2017  2:34 PM    You have not been prescribed any medications.

## 2017-12-21 NOTE — NURSING NOTE
"Chief Complaint   Patient presents with     Physical     Urinary Problem     x 4 days. Increased urinary frequency.     Nose Bleeds     x 1 yr. R nostril bleeding.       Initial /60 (BP Location: Left arm, Patient Position: Chair, Cuff Size: Adult Regular)  Pulse 62  Temp 98  F (36.7  C) (Oral)  Ht 5' 4\" (1.626 m)  Wt 199 lb (90.3 kg)  LMP 11/13/2017 (Approximate)  SpO2 98%  BMI 34.16 kg/m2 Estimated body mass index is 34.16 kg/(m^2) as calculated from the following:    Height as of this encounter: 5' 4\" (1.626 m).    Weight as of this encounter: 199 lb (90.3 kg).  Medication Reconciliation: complete     Kaminibose MA      "

## 2017-12-21 NOTE — PATIENT INSTRUCTIONS
Urine sample today.    ---    Labs downstairs today.    ---    Please schedule appointment with ENT for further evaluation of frequent nosebleeds.

## 2017-12-22 ENCOUNTER — TELEPHONE (OUTPATIENT)
Dept: NURSING | Facility: CLINIC | Age: 32
End: 2017-12-22

## 2017-12-22 LAB
C TRACH DNA SPEC QL NAA+PROBE: POSITIVE
DEPRECATED CALCIDIOL+CALCIFEROL SERPL-MC: 28 UG/L (ref 20–75)
N GONORRHOEA DNA SPEC QL NAA+PROBE: NEGATIVE
SPECIMEN SOURCE: ABNORMAL
SPECIMEN SOURCE: NORMAL

## 2017-12-22 NOTE — TELEPHONE ENCOUNTER
Was waiting until all of labs were back before contacting patient.     Still waiting for several.    Labs to date generally unremarkable.    Possible urinary tract infection, but culture is pending.    No evidence of kidney stones or kidney infection.

## 2017-12-22 NOTE — TELEPHONE ENCOUNTER
"Patient calling with .  Patient wanting advisement on labs from yesterday.  Stating she is having flank pain, \"I have kidney pain!\"  Attempted to gather more information but patient kept wanting PCP advisement on labs drawn.  Please advise on labs.      "

## 2017-12-23 LAB
BACTERIA SPEC CULT: NORMAL
SPECIMEN SOURCE: NORMAL

## 2017-12-24 DIAGNOSIS — A74.9 CHLAMYDIA INFECTION: Primary | ICD-10-CM

## 2017-12-24 RX ORDER — AZITHROMYCIN 500 MG/1
1000 TABLET, FILM COATED ORAL ONCE
Qty: 2 TABLET | Refills: 0 | Status: SHIPPED | OUTPATIENT
Start: 2017-12-24 | End: 2017-12-24

## 2017-12-27 ENCOUNTER — TELEPHONE (OUTPATIENT)
Dept: NURSING | Facility: CLINIC | Age: 32
End: 2017-12-27

## 2018-01-10 ENCOUNTER — OFFICE VISIT (OUTPATIENT)
Dept: OBGYN | Facility: CLINIC | Age: 33
End: 2018-01-10
Payer: COMMERCIAL

## 2018-01-10 VITALS
DIASTOLIC BLOOD PRESSURE: 58 MMHG | BODY MASS INDEX: 33.56 KG/M2 | TEMPERATURE: 98.2 F | WEIGHT: 195.5 LBS | SYSTOLIC BLOOD PRESSURE: 100 MMHG

## 2018-01-10 DIAGNOSIS — B96.89 BV (BACTERIAL VAGINOSIS): ICD-10-CM

## 2018-01-10 DIAGNOSIS — N94.9 VAGINAL SYMPTOM: ICD-10-CM

## 2018-01-10 DIAGNOSIS — Z30.432 ENCOUNTER FOR IUD REMOVAL: Primary | ICD-10-CM

## 2018-01-10 DIAGNOSIS — N76.0 BV (BACTERIAL VAGINOSIS): ICD-10-CM

## 2018-01-10 LAB
ALBUMIN UR-MCNC: NEGATIVE MG/DL
APPEARANCE UR: CLEAR
BILIRUB UR QL STRIP: NEGATIVE
COLOR UR AUTO: YELLOW
GLUCOSE UR STRIP-MCNC: NEGATIVE MG/DL
HGB UR QL STRIP: ABNORMAL
KETONES UR STRIP-MCNC: ABNORMAL MG/DL
LEUKOCYTE ESTERASE UR QL STRIP: ABNORMAL
MUCOUS THREADS #/AREA URNS LPF: PRESENT /LPF
NITRATE UR QL: NEGATIVE
NON-SQ EPI CELLS #/AREA URNS LPF: ABNORMAL /LPF
PH UR STRIP: 5 PH (ref 5–7)
RBC #/AREA URNS AUTO: ABNORMAL /HPF
SOURCE: ABNORMAL
SP GR UR STRIP: >1.03 (ref 1–1.03)
SPECIMEN SOURCE: ABNORMAL
UROBILINOGEN UR STRIP-ACNC: 0.2 EU/DL (ref 0.2–1)
WBC #/AREA URNS AUTO: ABNORMAL /HPF
WET PREP SPEC: ABNORMAL

## 2018-01-10 PROCEDURE — 87210 SMEAR WET MOUNT SALINE/INK: CPT | Performed by: ADVANCED PRACTICE MIDWIFE

## 2018-01-10 PROCEDURE — 87591 N.GONORRHOEAE DNA AMP PROB: CPT | Performed by: ADVANCED PRACTICE MIDWIFE

## 2018-01-10 PROCEDURE — 58301 REMOVE INTRAUTERINE DEVICE: CPT | Performed by: ADVANCED PRACTICE MIDWIFE

## 2018-01-10 PROCEDURE — 87491 CHLMYD TRACH DNA AMP PROBE: CPT | Performed by: ADVANCED PRACTICE MIDWIFE

## 2018-01-10 PROCEDURE — 81001 URINALYSIS AUTO W/SCOPE: CPT | Performed by: ADVANCED PRACTICE MIDWIFE

## 2018-01-10 PROCEDURE — 99203 OFFICE O/P NEW LOW 30 MIN: CPT | Mod: 25 | Performed by: ADVANCED PRACTICE MIDWIFE

## 2018-01-10 NOTE — NURSING NOTE
"Chief Complaint   Patient presents with     IUD     Remove Mirena       Initial /58 (BP Location: Right arm, Patient Position: Chair, Cuff Size: Adult Regular)  Temp 98.2  F (36.8  C) (Oral)  Wt 195 lb 8 oz (88.7 kg)  LMP 2017 (Approximate)  BMI 33.56 kg/m2 Estimated body mass index is 33.56 kg/(m^2) as calculated from the following:    Height as of 17: 5' 4\" (1.626 m).    Weight as of this encounter: 195 lb 8 oz (88.7 kg).  BP completed using cuff size: regular        The following HM Due: NONE      The following patient reported/Care Every where data was sent to:  P ABSTRACT QUALITY INITIATIVES [82347]        patient has appointment for today              "

## 2018-01-10 NOTE — MR AVS SNAPSHOT
"              After Visit Summary   1/10/2018    Ethel Chi    MRN: [id number]           Patient Information     Date Of Birth          1985        Visit Information        Provider Department      1/10/2018 3:30 PM Roslyn Orr APRN CNM; [facility] [employer] [facility]        Today's Diagnoses     Encounter for IUD removal    -  1    Vaginal symptom           Follow-ups after your visit        Follow-up notes from your care team     Return if symptoms worsen or fail to improve.      Who to contact     If you have questions or need follow up information about today's clinic visit or your schedule please contact [facility] directly at [phone].  Normal or non-critical lab and imaging results will be communicated to you by [employer], letter or phone within 4 business days after the clinic has received the results. If you do not hear from us within 7 days, please contact the clinic through [employer] or phone. If you have a critical or abnormal lab result, we will notify you by phone as soon as possible.  Submit refill requests through [employer] or call your pharmacy and they will forward the refill request to us. Please allow 3 business days for your refill to be completed.          Additional Information About Your Visit        MyChart Information     [employer] lets you send messages to your doctor, view your test results, renew your prescriptions, schedule appointments and more. To sign up, go to [email or web address] . Click on \"Log in\" on the left side of the screen, which will take you to the Welcome page. Then click on \"Sign up Now\" on the right side of the page.     You will be asked to enter the access code listed below, as well as some personal information. Please follow the directions to create your username and password.     Your access code is: [email or web address]  Expires: 2018  2:18 PM     Your access code will  in 90 days. If you " need help or a new code, please call your [place] clinic or [phone].        Care EveryWhere ID     This is your Care EveryWhere ID. This could be used by other organizations to access your [place] medical records  [id number]        Your Vitals Were     Temperature Last Period BMI (Body Mass Index)             98.2  F (36.8  C) (Oral) 11/13/2017 (Approximate) 33.56 kg/m2          Blood Pressure from Last 3 Encounters:   01/10/18 100/58   12/21/17 102/60   05/26/17 103/79    Weight from Last 3 Encounters:   01/10/18 195 lb 8 oz (88.7 kg)   12/21/17 199 lb (90.3 kg)   05/26/17 201 lb 11.5 oz (91.5 kg)              We Performed the Following     Chlamydia trachomatis PCR     Neisseria gonorrhoeae PCR     REMOVE INTRAUTERINE DEVICE     UA reflex to Microscopic     Wet prep        Primary Care Provider Office Phone # Fax #    Titi Parker [phone]       XXX RESIGNED XXX  [facility] [address]        Equal Access to Services     [facility]: Hadii aad ku hadasho Socayla, waaxda luqadaha, qaybta kaalmada kari, trisha phan . So [facility] [phone].    ATENCIÓN: Si habla español, tiene a veloz disposición servicios gratuitos de asistencia lingüística. Llame al [phone].    We comply with applicable federal civil rights laws and [place] laws. We do not discriminate on the basis of race, color, national origin, age, disability, sex, sexual orientation, or gender identity.            Thank you!     Thank you for choosing [facility]  for your care. Our goal is always to provide you with excellent care. Hearing back from our patients is one way we can continue to improve our services. Please take a few minutes to complete the written survey that you may receive in the mail after your visit with us. Thank you!             Your Updated Medication List - Protect others around you: Learn how to safely use, store and throw away your  medicines at www.disposemymeds.org.      Notice  As of 1/10/2018  4:52 PM    You have not been prescribed any medications.

## 2018-01-10 NOTE — PROGRESS NOTES
Ethel Chi is a 32 year old female,, Patient's last menstrual period was 2017 (approximate). who presents today for IUD removal. Her current Paragard T-380  IUD was placed 4 ago, she states she thought it was a Mirena. She has had problems including headaches with the IUD. She requests removal of the IUD because of problems stated above. Declines other forms of birth control. States she  does not live here and she is not concerned about pregnancy.  Also concerned about vaginal discharge today,  States her underwear gets very wet, often thinking she has urinated.  Occ burning with urination. Denies vaginal odor or itching. Denies abdominal pain.     O: /58 (BP Location: Right arm, Patient Position: Chair, Cuff Size: Adult Regular)  Temp 98.2  F (36.8  C) (Oral)  Wt 195 lb 8 oz (88.7 kg)  LMP 2017 (Approximate)  BMI 33.56 kg/m2  Physical Exam   Constitutional: She is oriented to person, place, and time and well-developed, well-nourished, and in no distress.   Cardiovascular: Normal rate.    Pulmonary/Chest: Effort normal.   Abdominal: Soft.   Genitourinary: Vagina normal, uterus normal and cervix normal.   Genitourinary Comments: IUD strings visualized at os    Neurological: She is oriented to person, place, and time.   Psychiatric: Affect normal.     Procedure:  After consent was obtained from the patient, IUD strings were grasped with ring forcep and IUD easily removed intact with minimal patient discomfort noted.  No bleeding noted.    She  tolerated the procedure well. There were no complications. Patient was discharged in stable condition.    A/P:     (Z30.432) Encounter for IUD removal  Comment: WNL  Plan: REMOVE INTRAUTERINE DEVICE        Removed, tolerated well   Reviewed fertility, patient not interested in other birth control forms at this time.      (N94.9) Vaginal symptom  Comment: done  Plan: Wet prep, UA reflex to Microscopic, Chlamydia         trachomatis PCR,  Neisseria gonorrhoeae PCR        Will review results and notify patient     present for entire visit   ROSEMARY Glynn, ROBERT     30 minutes was spent face to face with the patient today discussing her history, diagnosis, and follow-up plan as noted above. Over 50% of the visit was spent in counseling and coordination of care.

## 2018-01-11 ENCOUNTER — HOSPITAL ENCOUNTER (EMERGENCY)
Facility: CLINIC | Age: 33
Discharge: HOME | End: 2018-01-11
Attending: EMERGENCY MEDICINE | Admitting: EMERGENCY MEDICINE
Payer: COMMERCIAL

## 2018-01-11 ENCOUNTER — TELEPHONE (OUTPATIENT)
Dept: OBGYN | Facility: CLINIC | Age: 33
End: 2018-01-11

## 2018-01-11 ENCOUNTER — APPOINTMENT (OUTPATIENT)
Dept: CT IMAGING | Facility: CLINIC | Age: 33
End: 2018-01-11
Attending: EMERGENCY MEDICINE
Payer: COMMERCIAL

## 2018-01-11 VITALS
HEART RATE: 89 BPM | SYSTOLIC BLOOD PRESSURE: 104 MMHG | WEIGHT: 195 LBS | TEMPERATURE: 97.8 F | BODY MASS INDEX: 32.49 KG/M2 | HEIGHT: 65 IN | DIASTOLIC BLOOD PRESSURE: 72 MMHG | OXYGEN SATURATION: 100 % | RESPIRATION RATE: 18 BRPM

## 2018-01-11 DIAGNOSIS — A74.9 POSITIVE CHLAMYIDA TEST: Primary | ICD-10-CM

## 2018-01-11 DIAGNOSIS — G47.00 INSOMNIA, UNSPECIFIED TYPE: ICD-10-CM

## 2018-01-11 DIAGNOSIS — G44.209 TENSION HEADACHE: ICD-10-CM

## 2018-01-11 LAB
ALBUMIN SERPL-MCNC: 3.7 G/DL (ref 3.4–5)
ALP SERPL-CCNC: 88 U/L (ref 40–150)
ALT SERPL W P-5'-P-CCNC: 36 U/L (ref 0–50)
ANION GAP SERPL CALCULATED.3IONS-SCNC: 5 MMOL/L (ref 3–14)
AST SERPL W P-5'-P-CCNC: 22 U/L (ref 0–45)
BASOPHILS # BLD AUTO: 0.1 10E9/L (ref 0–0.2)
BASOPHILS NFR BLD AUTO: 0.7 %
BILIRUB SERPL-MCNC: 0.4 MG/DL (ref 0.2–1.3)
BUN SERPL-MCNC: 9 MG/DL (ref 7–30)
C TRACH DNA SPEC QL NAA+PROBE: POSITIVE
CALCIUM SERPL-MCNC: 8.8 MG/DL (ref 8.5–10.1)
CHLORIDE SERPL-SCNC: 107 MMOL/L (ref 94–109)
CO2 SERPL-SCNC: 28 MMOL/L (ref 20–32)
CREAT SERPL-MCNC: 0.63 MG/DL (ref 0.52–1.04)
DIFFERENTIAL METHOD BLD: NORMAL
EOSINOPHIL # BLD AUTO: 0.2 10E9/L (ref 0–0.7)
EOSINOPHIL NFR BLD AUTO: 1.8 %
ERYTHROCYTE [DISTWIDTH] IN BLOOD BY AUTOMATED COUNT: 13.5 % (ref 10–15)
GFR SERPL CREATININE-BSD FRML MDRD: >90 ML/MIN/1.7M2
GLUCOSE SERPL-MCNC: 76 MG/DL (ref 70–99)
HCT VFR BLD AUTO: 45.3 % (ref 35–47)
HGB BLD-MCNC: 14.8 G/DL (ref 11.7–15.7)
IMM GRANULOCYTES # BLD: 0 10E9/L (ref 0–0.4)
IMM GRANULOCYTES NFR BLD: 0.1 %
LYMPHOCYTES # BLD AUTO: 2.7 10E9/L (ref 0.8–5.3)
LYMPHOCYTES NFR BLD AUTO: 33.5 %
MCH RBC QN AUTO: 30.2 PG (ref 26.5–33)
MCHC RBC AUTO-ENTMCNC: 32.7 G/DL (ref 31.5–36.5)
MCV RBC AUTO: 92 FL (ref 78–100)
MONOCYTES # BLD AUTO: 0.5 10E9/L (ref 0–1.3)
MONOCYTES NFR BLD AUTO: 6.5 %
N GONORRHOEA DNA SPEC QL NAA+PROBE: NEGATIVE
NEUTROPHILS # BLD AUTO: 4.7 10E9/L (ref 1.6–8.3)
NEUTROPHILS NFR BLD AUTO: 57.4 %
NRBC # BLD AUTO: 0 10*3/UL
NRBC BLD AUTO-RTO: 0 /100
PLATELET # BLD AUTO: 292 10E9/L (ref 150–450)
POTASSIUM SERPL-SCNC: 3.7 MMOL/L (ref 3.4–5.3)
PROT SERPL-MCNC: 8.7 G/DL (ref 6.8–8.8)
RBC # BLD AUTO: 4.9 10E12/L (ref 3.8–5.2)
SODIUM SERPL-SCNC: 140 MMOL/L (ref 133–144)
SPECIMEN SOURCE: ABNORMAL
SPECIMEN SOURCE: NORMAL
TSH SERPL DL<=0.005 MIU/L-ACNC: 1.4 MU/L (ref 0.4–4)
WBC # BLD AUTO: 8.2 10E9/L (ref 4–11)

## 2018-01-11 PROCEDURE — 99284 EMERGENCY DEPT VISIT MOD MDM: CPT | Mod: 25

## 2018-01-11 PROCEDURE — 85025 COMPLETE CBC W/AUTO DIFF WBC: CPT | Performed by: EMERGENCY MEDICINE

## 2018-01-11 PROCEDURE — 84443 ASSAY THYROID STIM HORMONE: CPT | Performed by: EMERGENCY MEDICINE

## 2018-01-11 PROCEDURE — 80053 COMPREHEN METABOLIC PANEL: CPT | Performed by: EMERGENCY MEDICINE

## 2018-01-11 PROCEDURE — 70450 CT HEAD/BRAIN W/O DYE: CPT

## 2018-01-11 RX ORDER — DIPHENHYDRAMINE HCL 25 MG
50 TABLET ORAL
Qty: 30 TABLET | Refills: 0 | Status: SHIPPED | OUTPATIENT
Start: 2018-01-11 | End: 2018-04-12

## 2018-01-11 RX ORDER — METRONIDAZOLE 500 MG/1
500 TABLET ORAL 2 TIMES DAILY
Qty: 14 TABLET | Refills: 0 | Status: SHIPPED | OUTPATIENT
Start: 2018-01-11 | End: 2018-01-18

## 2018-01-11 ASSESSMENT — ENCOUNTER SYMPTOMS
HEADACHES: 1
PHOTOPHOBIA: 0

## 2018-01-11 NOTE — ED AVS SNAPSHOT
[facility] Emergency Department    [address]    [facility] [address]    Phone:  [phone]    Fax:  [phone]                                       Ethel Chi   MRN: [id number]    Department:  [facility] Emergency Department   Date of Visit:  1/11/2018           Patient Information     Date Of Birth          1985        Your diagnoses for this visit were:     Tension headache     Insomnia, unspecified type        You were seen by Jessica Waldrop MD.      Follow-up Information     Follow up with [facility].    Contact information:    [phone]        Discharge Instructions       Please make an appointment to follow up with [facility] [phone]      Discharge Instructions  Headache    You were seen today for a headache. Headaches may be caused by many different things such as muscle tension, sinus inflammation, anxiety and stress, having too little sleep, too much alcohol, some medical conditions or injury. You may have a migraine, which is caused by changes in the blood vessels in your head.  At this time your provider does not find that your headache is a sign of anything dangerous or life-threatening.  However, sometimes the signs of serious illness do not show up right away.      Generally, every Emergency Department visit should have a follow-up clinic visit with either a primary or a specialty clinic/provider. Please follow-up as instructed by your emergency provider today.    Return to the Emergency Department if:    You get a new fever of 100.4 F or higher.    Your headache gets much worse.    You get a stiff neck with your headache.    You get a new headache that is significantly different or worse than headaches you have had before.    You are vomiting (throwing up) and cannot keep food or water down.    You have blurry or double vision or other problems with your eyes.    You have a new  weakness on one side of your body.    You have difficulty with balance which is new.    You or your family thinks you are confused.    You have a seizure.    What can I do to help myself?    Pain medications - You may take a pain medication such as Tylenol  (acetaminophen), Advil , Motrin  (ibuprofen) or Aleve  (naproxen).    Take a pain reliever as soon as you notice symptoms.  Starting medications as soon as you start to have symptoms may lessen the amount of pain you have.    Relaxing in a quiet, dark room may help.    Get enough sleep and eat meals regularly.    You may need to watch for certain foods or other things which may trigger your headaches.  Keeping a journal of your headaches and possible triggers may help you and your primary provider to identify things which you should avoid which may be causing your headaches.  If you were given a prescription for medicine here today, be sure to read all of the information (including the package insert) that comes with your prescription.  This will include important information about the medicine, its side effects, and any warnings that you need to know about.  The pharmacist who fills the prescription can provide more information and answer questions you may have about the medicine.  If you have questions or concerns that the pharmacist cannot address, please call or return to the Emergency Department.   Remember that you can always come back to the Emergency Department if you are not able to see your regular provider in the amount of time listed above, if you get any new symptoms, or if there is anything that worries you.        Discharge References/Attachments     INSOMNIA (ENGLISH)      24 Hour Appointment Hotline       To make an appointment at any [facility], call [phone] ([phone]). If you don't have a family doctor or clinic, we will help you find one. [facility] clinics are conveniently located to serve the needs of you and your family.              Review of your medicines      START taking        Dose / Directions Last dose taken    diphenhydrAMINE 25 MG tablet   Commonly known as:  BENADRYL   Dose:  50 mg   Quantity:  30 tablet        Take 2 tablets (50 mg) by mouth nightly as needed for sleep May take 2 more tablets one hour later if not asleep.   Refills:  0          Our records show that you are taking the medicines listed below. If these are incorrect, please call your family doctor or clinic.        Dose / Directions Last dose taken    metroNIDAZOLE 500 MG tablet   Commonly known as:  FLAGYL   Dose:  500 mg   Quantity:  14 tablet        Take 1 tablet (500 mg) by mouth 2 times daily for 7 days   Refills:  0                Prescriptions were sent or printed at these locations (1 Prescription)                   Other Prescriptions                Printed at Department/Unit printer (1 of 1)         diphenhydrAMINE (BENADRYL) 25 MG tablet                Procedures and tests performed during your visit     CBC with platelets differential    CT Head w/o Contrast    Comprehensive metabolic panel    TSH with free T4 reflex      Orders Needing Specimen Collection     None      Pending Results     No orders found from 1/9/2018 to 1/12/2018.            Pending Culture Results     No orders found from 1/9/2018 to 1/12/2018.            Pending Results Instructions     If you had any lab results that were not finalized at the time of your Discharge, you can call the ED Lab Result RN at [phone]. You will be contacted by this team for any positive Lab results or changes in treatment. The nurses are available 7 days a week from 10A to 6:30P.  You can leave a message 24 hours per day and they will return your call.        Test Results From Your Hospital Stay        1/11/2018  9:22 PM      Component Results     Component Value Ref Range & Units Status    WBC 8.2 4.0 - 11.0 10e9/L Final    RBC Count 4.90 3.8 - 5.2 10e12/L Final    Hemoglobin 14.8 11.7 - 15.7  g/dL Final    Hematocrit 45.3 35.0 - 47.0 % Final    MCV 92 78 - 100 fl Final    MCH 30.2 26.5 - 33.0 pg Final    MCHC 32.7 31.5 - 36.5 g/dL Final    RDW 13.5 10.0 - 15.0 % Final    Platelet Count 292 150 - 450 10e9/L Final    Diff Method Automated Method  Final    % Neutrophils 57.4 % Final    % Lymphocytes 33.5 % Final    % Monocytes 6.5 % Final    % Eosinophils 1.8 % Final    % Basophils 0.7 % Final    % Immature Granulocytes 0.1 % Final    Nucleated RBCs 0 0 /100 Final    Absolute Neutrophil 4.7 1.6 - 8.3 10e9/L Final    Absolute Lymphocytes 2.7 0.8 - 5.3 10e9/L Final    Absolute Monocytes 0.5 0.0 - 1.3 10e9/L Final    Absolute Eosinophils 0.2 0.0 - 0.7 10e9/L Final    Absolute Basophils 0.1 0.0 - 0.2 10e9/L Final    Abs Immature Granulocytes 0.0 0 - 0.4 10e9/L Final    Absolute Nucleated RBC 0.0  Final         1/11/2018  9:44 PM      Component Results     Component Value Ref Range & Units Status    Sodium 140 133 - 144 mmol/L Final    Potassium 3.7 3.4 - 5.3 mmol/L Final    Chloride 107 94 - 109 mmol/L Final    Carbon Dioxide 28 20 - 32 mmol/L Final    Anion Gap 5 3 - 14 mmol/L Final    Glucose 76 70 - 99 mg/dL Final    Urea Nitrogen 9 7 - 30 mg/dL Final    Creatinine 0.63 0.52 - 1.04 mg/dL Final    GFR Estimate >90 >60 mL/min/1.7m2 Final    Non  GFR Calc    GFR Estimate If Black >90 >60 mL/min/1.7m2 Final    African American GFR Calc    Calcium 8.8 8.5 - 10.1 mg/dL Final    Bilirubin Total 0.4 0.2 - 1.3 mg/dL Final    Albumin 3.7 3.4 - 5.0 g/dL Final    Protein Total 8.7 6.8 - 8.8 g/dL Final    Alkaline Phosphatase 88 40 - 150 U/L Final    ALT 36 0 - 50 U/L Final    AST 22 0 - 45 U/L Final         1/11/2018  9:49 PM      Component Results     Component Value Ref Range & Units Status    TSH 1.40 0.40 - 4.00 mU/L Final         1/11/2018 10:27 PM      Narrative     CT SCAN OF THE HEAD WITHOUT CONTRAST   1/11/2018 9:47 PM     HISTORY: New left-sided headache.     TECHNIQUE:  Axial images of the  head and coronal reformations without  IV contrast material. Radiation dose for this scan was reduced using  automated exposure control, adjustment of the mA and/or kV according  to patient size, or iterative reconstruction technique.    COMPARISON: 3/1/2005.    FINDINGS: There is no evidence of intracranial hemorrhage, mass, acute  infarct or anomaly. The ventricles are normal in size, shape and  configuration. The brain parenchyma and subarachnoid spaces are  normal.    The visualized portions of the sinuses and mastoids appear normal. The  bony calvarium and bones of the skull base appear intact.         Impression     IMPRESSION:   Normal CT scan of the head.    RHODA VASQUEZ MD                Clinical Quality Measure: Blood Pressure Screening     Your blood pressure was checked while you were in the emergency department today. The last reading we obtained was  BP: 101/78 . Please read the guidelines below about what these numbers mean and what you should do about them.  If your systolic blood pressure (the top number) is less than 120 and your diastolic blood pressure (the bottom number) is less than 80, then your blood pressure is normal. There is nothing more that you need to do about it.  If your systolic blood pressure (the top number) is 120-139 or your diastolic blood pressure (the bottom number) is 80-89, your blood pressure may be higher than it should be. You should have your blood pressure rechecked within a year by a primary care provider.  If your systolic blood pressure (the top number) is 140 or greater or your diastolic blood pressure (the bottom number) is 90 or greater, you may have high blood pressure. High blood pressure is treatable, but if left untreated over time it can put you at risk for heart attack, stroke, or kidney failure. You should have your blood pressure rechecked by a primary care provider within the next 4 weeks.  If your provider in the emergency department today gave you  "specific instructions to follow-up with your doctor or provider even sooner than that, you should follow that instruction and not wait for up to 4 weeks for your follow-up visit.        Thank you for choosing [facility]       Thank you for choosing [facility] for your care. Our goal is always to provide you with excellent care. Hearing back from our patients is one way we can continue to improve our services. Please take a few minutes to complete the written survey that you may receive in the mail after you visit with us. Thank you!        [employer] Information     [employer] lets you send messages to your doctor, view your test results, renew your prescriptions, schedule appointments and more. To sign up, go to [email or web address] . Click on \"Log in\" on the left side of the screen, which will take you to the Welcome page. Then click on \"Sign up Now\" on the right side of the page.     You will be asked to enter the access code listed below, as well as some personal information. Please follow the directions to create your username and password.     Your access code is: [email or web address]  Expires: 2018  2:18 PM     Your access code will  in 90 days. If you need help or a new code, please call your [facility] clinic or [phone].        Care EveryWhere ID     This is your Care EveryWhere ID. This could be used by other organizations to access your [facility] medical records  [id number]        Equal Access to Services     SHOLA WEST : Hadii prema Chauhan, waaxda cholo, qaybta kaalmada trisha pierce . So [facility] [phone].    ATENCIÓN: Si habla español, tiene a veloz disposición servicios gratuitos de asistencia lingüística. Llame al [phone].    We comply with applicable federal civil rights laws and [place] laws. We do not discriminate on the basis of race, color, national origin, age, disability, sex, sexual orientation, or gender identity.            After " Visit Summary       This is your record. Keep this with you and show to your community pharmacist(s) and doctor(s) at your next visit.

## 2018-01-11 NOTE — ED AVS SNAPSHOT
[facility] Emergency Department    [address]    [facility] [address]    Phone:  [phone]    Fax:  [phone]                                       Ethel Chi   MRN: [id number]    Department:  [facility] Emergency Department   Date of Visit:  1/11/2018           After Visit Summary Signature Page     I have received my discharge instructions, and my questions have been answered. I have discussed any challenges I see with this plan with the nurse or doctor.    ..........................................................................................................................................  Patient/Patient Representative Signature      ..........................................................................................................................................  Patient Representative Print Name and Relationship to Patient    ..................................................               ................................................  Date                                            Time    ..........................................................................................................................................  Reviewed by Signature/Title    ...................................................              ..............................................  Date                                                            Time

## 2018-01-11 NOTE — TELEPHONE ENCOUNTER
Spoke to Roslyn Orr CNM on the phone.  Okay for treatment azithromycin 1Gm po x1 time.    Called pt and lm to cb.  Need pharmacy and to report pos chlamydia res and also her partner will need to go in and be seen.        Marichuy CHOU R.N.  [facility]

## 2018-01-11 NOTE — TELEPHONE ENCOUNTER
Pt has pos chlamydia result.      Please advise on tx and I can call pt to report result.        Marichuy CHOU R.N.  [facility]

## 2018-01-12 RX ORDER — AZITHROMYCIN 1 G/1
1 POWDER, FOR SUSPENSION ORAL ONCE
Qty: 1 EACH | Refills: 0 | Status: SHIPPED | OUTPATIENT
Start: 2018-01-12 | End: 2018-01-12

## 2018-01-12 NOTE — TELEPHONE ENCOUNTER
Spoke to pt.  Advised of result and need for partner to be treated.      Sent in medication, confirmed no known allergies.    MD form filled out and faxed.    Marichuy CHOU R.N.  [facility]

## 2018-01-12 NOTE — ED PROVIDER NOTES
"  History     Chief Complaint:  Headache    HPI   Ethel Chi is a 32 year old female who presents with a headache. The patient reports that she has been experiencing a sensation of \"having something moving around\" in the left side of her head. She also reports that her hair has been falling out for the past two months. During this time she has been having a difficult time falling asleep as well. She reports that she has been having headaches constantly for a long time. She normally takes ibuprofen for her headache and reports that this does typically help. She denies any risk of pregnancy. Of note the patient did have an IUD replaced yesterday that had been in place since 2013. She denies having experienced any rashes, photophobia, or changes in vision.    Allergies:  No Known Drug Allergies     Medications:    Flagyl    Past Medical History:    Depression    Past Surgical History:     Laparoscopic cholecystectomy    Family History:    Hypertension    Social History:  The patient was accompanied to the ED by her daughter.  Smoking Status: No  Smokeless Tobacco: No  Alcohol Use: No   Marital Status:  Single [1]    Review of Systems   Eyes: Negative for photophobia and visual disturbance.   Skin: Negative for rash.   Neurological: Positive for headaches.   All other systems reviewed and are negative.    Physical Exam   Vitals:  Patient Vitals for the past 24 hrs:   BP Temp Pulse Heart Rate Resp SpO2 Height Weight   01/11/18 2116 - - - - - 98 % - -   01/11/18 2114 101/78 - - - - - - -   01/11/18 2047 111/70 97.8  F (36.6  C) 89 89 18 100 % 1.651 m (5' 5\") 88.5 kg (195 lb)   01/11/18 2045 - - - - - 100 % - -        Physical Exam  General: Cooperative  Head:  The scalp, face, and head appear normal and symmetric.  No rash.  No bald patches.  Eyes:  Sclera white; PERRL; EOMI  ENT:    External ears and nares normal  Neck:  No meningismus  CV:  Rate as above with regular rhythm   Resp:  Breath sounds clear and equal " bilaterally  GI:  Abdomen is soft, non-tender, non-distended  MS:  Moves all extremities  Neuro:  Speech is normal and fluent. No apparent deficit    Strength 5/5 x4.  Sensation intact x4.      Cranial nerves II-XII intact by examination.     Emergency Department Course     Imaging:  Radiology findings were communicated with the patient who voiced understanding of the findings.  CT head w/o contrast:  IMPRESSION:   Normal CT scan of the head.  Reading per radiology.     Laboratory:  Laboratory findings were communicated with the patient who voiced understanding of the findings.  CBC: AWNL (WBC 8.2, HGB 14.8, )  CMP: AWNL (Creatinine 0.63)   TSH: 1.40    Emergency Department Course:  Nursing notes and vitals reviewed.  I performed an exam of the patient as documented above.   IV was inserted and blood was drawn for laboratory testing, results above.  The patient was sent for a CT while in the emergency department, results above.      I personally reviewed the laboratory results with the P\patient and answered all related questions prior to discharge.    Impression & Plan      Medical Decision Making:  Ethel Chi is a 32 year old female who presents to the emergency department today for evaluation of a headache with a new onset of a cold movement on the left side. This is an acute change for her and she has not had a previous workup for headaches. At this point she is neurologically in tact and I discussed with her the limited role of CT in this setting, though given that she has had no previous imaging she would like some further workup. She was also evaluated for cause that may be associated with losing her hair. Labs reveal no evidence for anemia, electrolyte abnormalities, anemia, or thyroid abnormality. Patient has not been able to sleep recently and I think that this may help her headache management. She has been given a limited prescription for benadryl and will follow up with Neurology.       Diagnosis:    ICD-10-CM    1. Tension headache G44.209    2. Insomnia, unspecified type G47.00         Disposition:   Discharged    CMS Diagnoses: None     Discharge Medications:  New Prescriptions    DIPHENHYDRAMINE (BENADRYL) 25 MG TABLET    Take 2 tablets (50 mg) by mouth nightly as needed for sleep May take 2 more tablets one hour later if not asleep.     Scribe Disclosure:  I, Luther De La Fuente, am serving as a scribe at 8:54 PM on 1/11/2018 to document services personally performed by Jessica Waldrop MD, based on my observations and the provider's statements to me.   [facility] EMERGENCY DEPARTMENT       Jessica Waldrop MD  01/13/18 0013

## 2018-01-12 NOTE — DISCHARGE INSTRUCTIONS
Please make an appointment to follow up with [facility] [phone]      Discharge Instructions  Headache    You were seen today for a headache. Headaches may be caused by many different things such as muscle tension, sinus inflammation, anxiety and stress, having too little sleep, too much alcohol, some medical conditions or injury. You may have a migraine, which is caused by changes in the blood vessels in your head.  At this time your provider does not find that your headache is a sign of anything dangerous or life-threatening.  However, sometimes the signs of serious illness do not show up right away.      Generally, every Emergency Department visit should have a follow-up clinic visit with either a primary or a specialty clinic/provider. Please follow-up as instructed by your emergency provider today.    Return to the Emergency Department if:    You get a new fever of 100.4 F or higher.    Your headache gets much worse.    You get a stiff neck with your headache.    You get a new headache that is significantly different or worse than headaches you have had before.    You are vomiting (throwing up) and cannot keep food or water down.    You have blurry or double vision or other problems with your eyes.    You have a new weakness on one side of your body.    You have difficulty with balance which is new.    You or your family thinks you are confused.    You have a seizure.    What can I do to help myself?    Pain medications - You may take a pain medication such as Tylenol  (acetaminophen), Advil , Motrin  (ibuprofen) or Aleve  (naproxen).    Take a pain reliever as soon as you notice symptoms.  Starting medications as soon as you start to have symptoms may lessen the amount of pain you have.    Relaxing in a quiet, dark room may help.    Get enough sleep and eat meals regularly.    You may need to watch for certain foods or other things which may trigger your headaches.  Keeping a journal  of your headaches and possible triggers may help you and your primary provider to identify things which you should avoid which may be causing your headaches.  If you were given a prescription for medicine here today, be sure to read all of the information (including the package insert) that comes with your prescription.  This will include important information about the medicine, its side effects, and any warnings that you need to know about.  The pharmacist who fills the prescription can provide more information and answer questions you may have about the medicine.  If you have questions or concerns that the pharmacist cannot address, please call or return to the Emergency Department.   Remember that you can always come back to the Emergency Department if you are not able to see your regular provider in the amount of time listed above, if you get any new symptoms, or if there is anything that worries you.

## 2018-04-12 ENCOUNTER — OFFICE VISIT (OUTPATIENT)
Dept: INTERNAL MEDICINE | Facility: CLINIC | Age: 33
End: 2018-04-12
Payer: COMMERCIAL

## 2018-04-12 VITALS
HEART RATE: 72 BPM | WEIGHT: 185.4 LBS | DIASTOLIC BLOOD PRESSURE: 60 MMHG | BODY MASS INDEX: 31.65 KG/M2 | HEIGHT: 64 IN | TEMPERATURE: 98.8 F | SYSTOLIC BLOOD PRESSURE: 110 MMHG

## 2018-04-12 DIAGNOSIS — R39.9 LOWER URINARY TRACT SYMPTOMS (LUTS): Primary | ICD-10-CM

## 2018-04-12 DIAGNOSIS — Z76.89 REFERRAL OF PATIENT: ICD-10-CM

## 2018-04-12 DIAGNOSIS — N92.6 MISSED PERIOD: ICD-10-CM

## 2018-04-12 DIAGNOSIS — R82.90 ABNORMAL URINALYSIS: ICD-10-CM

## 2018-04-12 LAB
ALBUMIN UR-MCNC: NEGATIVE MG/DL
APPEARANCE UR: CLEAR
B-HCG SERPL-ACNC: 2 IU/L (ref 0–5)
BACTERIA #/AREA URNS HPF: ABNORMAL /HPF
BILIRUB UR QL STRIP: NEGATIVE
COLOR UR AUTO: YELLOW
ERYTHROCYTE [DISTWIDTH] IN BLOOD BY AUTOMATED COUNT: 14 % (ref 10–15)
FSH SERPL-ACNC: 40.8 IU/L
GLUCOSE UR STRIP-MCNC: NEGATIVE MG/DL
HCT VFR BLD AUTO: 42.8 % (ref 35–47)
HGB BLD-MCNC: 13.8 G/DL (ref 11.7–15.7)
HGB UR QL STRIP: NEGATIVE
KETONES UR STRIP-MCNC: ABNORMAL MG/DL
LEUKOCYTE ESTERASE UR QL STRIP: ABNORMAL
LH SERPL-ACNC: 16.8 IU/L
MCH RBC QN AUTO: 29.9 PG (ref 26.5–33)
MCHC RBC AUTO-ENTMCNC: 32.2 G/DL (ref 31.5–36.5)
MCV RBC AUTO: 93 FL (ref 78–100)
NITRATE UR QL: NEGATIVE
NON-SQ EPI CELLS #/AREA URNS LPF: ABNORMAL /LPF
PH UR STRIP: 7 PH (ref 5–7)
PLATELET # BLD AUTO: 286 10E9/L (ref 150–450)
RBC # BLD AUTO: 4.62 10E12/L (ref 3.8–5.2)
RBC #/AREA URNS AUTO: ABNORMAL /HPF
SOURCE: ABNORMAL
SP GR UR STRIP: 1.02 (ref 1–1.03)
TSH SERPL DL<=0.005 MIU/L-ACNC: 0.93 MU/L (ref 0.4–4)
UROBILINOGEN UR STRIP-ACNC: 1 EU/DL (ref 0.2–1)
WBC # BLD AUTO: 8.7 10E9/L (ref 4–11)
WBC #/AREA URNS AUTO: ABNORMAL /HPF

## 2018-04-12 PROCEDURE — 83001 ASSAY OF GONADOTROPIN (FSH): CPT | Performed by: INTERNAL MEDICINE

## 2018-04-12 PROCEDURE — 83002 ASSAY OF GONADOTROPIN (LH): CPT | Performed by: INTERNAL MEDICINE

## 2018-04-12 PROCEDURE — 81001 URINALYSIS AUTO W/SCOPE: CPT | Performed by: INTERNAL MEDICINE

## 2018-04-12 PROCEDURE — 36415 COLL VENOUS BLD VENIPUNCTURE: CPT | Performed by: INTERNAL MEDICINE

## 2018-04-12 PROCEDURE — 87086 URINE CULTURE/COLONY COUNT: CPT | Performed by: INTERNAL MEDICINE

## 2018-04-12 PROCEDURE — 84443 ASSAY THYROID STIM HORMONE: CPT | Performed by: INTERNAL MEDICINE

## 2018-04-12 PROCEDURE — 99214 OFFICE O/P EST MOD 30 MIN: CPT | Performed by: INTERNAL MEDICINE

## 2018-04-12 PROCEDURE — 84702 CHORIONIC GONADOTROPIN TEST: CPT | Performed by: INTERNAL MEDICINE

## 2018-04-12 PROCEDURE — 85027 COMPLETE CBC AUTOMATED: CPT | Performed by: INTERNAL MEDICINE

## 2018-04-12 RX ORDER — NITROFURANTOIN 25; 75 MG/1; MG/1
100 CAPSULE ORAL 2 TIMES DAILY
Qty: 14 CAPSULE | Refills: 0 | Status: SHIPPED | OUTPATIENT
Start: 2018-04-12 | End: 2018-05-30

## 2018-04-12 ASSESSMENT — PAIN SCALES - GENERAL: PAINLEVEL: MILD PAIN (2)

## 2018-04-12 NOTE — PATIENT INSTRUCTIONS
Urine today.    Downstairs for blood tests today.    ---    START Macrobid twice a day x 7 days for suspected urinary tract infection.    ---    Phone numbers for ob/gyn below. Please call to schedule.

## 2018-04-12 NOTE — MR AVS SNAPSHOT
After Visit Summary   4/12/2018    Ethel Chi    MRN: [id number]           Patient Information     Date Of Birth          1985        Visit Information        Provider Department      4/12/2018 3:15 PM Estela Hernandez MD; [employer] [facility]        Today's Diagnoses     Missed period    -  1    Lower urinary tract symptoms (LUTS)        Referral of patient          Care Instructions    Urine today.    Downstairs for blood tests today.    ---    START Macrobid twice a day x 7 days for suspected urinary tract infection.    ---    Phone numbers for ob/gyn below. Please call to schedule.             Follow-ups after your visit        Additional Services     OB/GYN REFERRAL       Your provider has referred you to:  FM[facility]: [facility] [phone]   [email or web address]  FMG: [facility] [phone]  [email or web address]    Please be aware that coverage of these services is subject to the terms and limitations of your health insurance plan.  Call member services at your health plan with any benefit or coverage questions.      Please bring the following with you to your appointment:    (1) Any X-Rays, CTs or MRIs which have been performed.  Contact the facility where they were done to arrange for  prior to your scheduled appointment.   (2) List of current medications   (3) This referral request   (4) Any documents/labs given to you for this referral                  Who to contact     If you have questions or need follow up information about today's clinic visit or your schedule please contact [facility] directly at [phone].  Normal or non-critical lab and imaging results will be communicated to you by MyChart, letter or phone within 4 business days after the clinic has received the results. If you  "do not hear from us within 7 days, please contact the clinic through Eve or phone. If you have a critical or abnormal lab result, we will notify you by phone as soon as possible.  Submit refill requests through Eve or call your pharmacy and they will forward the refill request to us. Please allow 3 business days for your refill to be completed.          Additional Information About Your Visit        [employer] Information     Eve lets you send messages to your doctor, view your test results, renew your prescriptions, schedule appointments and more. To sign up, go to [email or web address] . Click on \"Log in\" on the left side of the screen, which will take you to the Welcome page. Then click on \"Sign up Now\" on the right side of the page.     You will be asked to enter the access code listed below, as well as some personal information. Please follow the directions to create your username and password.     Your access code is: [email or web address]  Expires: 2018  3:48 PM     Your access code will  in 90 days. If you need help or a new code, please call your [facility] clinic or [phone].        Care EveryWhere ID     This is your Care EveryWhere ID. This could be used by other organizations to access your [facility] medical records  [id number]        Your Vitals Were     Pulse Temperature Height Last Period BMI (Body Mass Index)       72 98.8  F (37.1  C) (Oral) 5' 4\" (1.626 m) 12/15/2017 (Approximate) 31.82 kg/m2        Blood Pressure from Last 3 Encounters:   18 110/60   18 104/72   01/10/18 100/58    Weight from Last 3 Encounters:   18 185 lb 6.4 oz (84.1 kg)   18 195 lb (88.5 kg)   01/10/18 195 lb 8 oz (88.7 kg)              We Performed the Following     CBC with platelets     Follicle stimulating hormone     HCG Quantitative Pregnancy, Blood (OBU211)     Lutropin     OB/GYN REFERRAL     TSH with free T4 reflex     UA reflex to Microscopic and Culture          Today's " Medication Changes          These changes are accurate as of 4/12/18  3:48 PM.  If you have any questions, ask your nurse or doctor.               Start taking these medicines.        Dose/Directions    nitroFURantoin (macrocrystal-monohydrate) 100 MG capsule   Commonly known as:  MACROBID   Used for:  Lower urinary tract symptoms (LUTS)   Started by:  Estela Hernandez MD        Dose:  100 mg   Take 1 capsule (100 mg) by mouth 2 times daily   Quantity:  14 capsule   Refills:  0            Where to get your medicines      These medications were sent to [facility] [address] [address]     Phone:  [phone]     nitroFURantoin (macrocrystal-monohydrate) 100 MG capsule                Primary Care Provider Fax #    Physician No Ref-Primary [phone]       No address on file        Equal Access to Services     SHOLA WEST : Hadii prema babbo Socayla, waaxda luqadaha, qaybta kaalmada adedakotayada, trisha phan . So [facility] [phone].    ATENCIÓN: Si habla español, tiene a veloz disposición servicios gratuitos de asistencia lingüística. Llame al [phone].    We comply with applicable federal civil rights laws and [place] laws. We do not discriminate on the basis of race, color, national origin, age, disability, sex, sexual orientation, or gender identity.            Thank you!     Thank you for choosing [facility]  for your care. Our goal is always to provide you with excellent care. Hearing back from our patients is one way we can continue to improve our services. Please take a few minutes to complete the written survey that you may receive in the mail after your visit with us. Thank you!             Your Updated Medication List - Protect others around you: Learn how to safely use, store and throw away your medicines at www.disposemymeds.org.          This list is accurate as of 4/12/18   3:48 PM.  Always use your most recent med list.                   Brand Name Dispense Instructions for use Diagnosis    nitroFURantoin (macrocrystal-monohydrate) 100 MG capsule    MACROBID    14 capsule    Take 1 capsule (100 mg) by mouth 2 times daily    Lower urinary tract symptoms (LUTS)

## 2018-04-12 NOTE — PROGRESS NOTES
"  SUBJECTIVE:                                                      HPI: Ethel Chi is a pleasant 33 year old female who presents with urinary symptoms and missed period:    Re: urinary symptoms:   - ongoing for 3 days  - symptoms include urinary frequency, urgency, and dysuria  - associated subjective fevers, chills, and sweats  - associated lower back pain, midline and bilaterally    - no hematuria  - no nausea or vomiting  - no abdominal or flank pain    Re: missed period:  - LMP ~5 months ago  - prior to that periods were regular/monthly    - patient is sexually active with male partner; they use condoms   - she is interested in getting a Nexplanon would like referral for this   - she does not think she is pregnant -consistent condom use and no nausea, fatigue, weight gain, or breast tenderness    - patient has also lost 10-15 lbs in the last several months - intentionally through diet and exercise    ---    The medication, allergy, and problem lists have been reviewed and updated as appropriate.       OBJECTIVE:                                                      /60 (BP Location: Left arm, Patient Position: Chair, Cuff Size: Adult Regular)  Pulse 72  Temp 98.8  F (37.1  C) (Oral)  Ht 5' 4\" (1.626 m)  Wt 185 lb 6.4 oz (84.1 kg)  LMP 12/15/2017 (Approximate)  BMI 31.82 kg/m2  Constitutional: well-appearing  Gastrointestinal: soft, mild lower quadrant tenderness to palpation, non-distended, and bowel sounds present; no organomegaly or masses; no flank tenderness to palpation  Lumbar spine: no deformity, crepitus, or step-off; spinal and bilateral paraspinal mild tenderness to palpation  Musculoskeletal: normal gait and station  Psych: normal judgment and insight; normal mood and affect; recent and remote memory intact      ASSESSMENT/PLAN:                                                      (R39.9) Lower urinary tract symptoms (LUTS)  (primary encounter diagnosis)  Comment:    - suspect UTI.   - " low suspicion for-pyelonephritis based on exam (no flank tenderness to palpation).  Plan:    - UA reflex and CBC today.   - START Macrobid twice a day x 7 days.     (N92.6) Missed period  Comment:    - etiology unclear.   - sexually active with male partner, so need to rule out pregnancy.   - recent weight loss may also be contributing to amenorrhea.   - will also evaluate for thyroid disorder and early menopause.  Plan: serum hCG, TSH, FSH, and LH today.     (Z76.89) Referral of patient  Plan: referred to OB/GYN for Nexplanon evaluation/placement - patient to schedule.     The instructions on the AVS were discussed and explained to the patient. Patient expressed understanding of instructions.    A total of 25 minutes were spent face-to-face with this patient during this encounter and over half of that time was spent on counseling and coordination of care re: above diagnoses and plans of care.     (Chart documentation was completed, in part, with Scuttledog voice-recognition software. Even though reviewed, some grammatical, spelling, and word errors may remain.)    Estela Hernandez MD   [address] [address]  T: [phone], F: [phone]

## 2018-04-13 DIAGNOSIS — R82.90 ABNORMAL URINALYSIS: Primary | ICD-10-CM

## 2018-04-14 LAB
BACTERIA SPEC CULT: NORMAL
SPECIMEN SOURCE: NORMAL

## 2018-04-25 ENCOUNTER — OFFICE VISIT (OUTPATIENT)
Dept: OBGYN | Facility: CLINIC | Age: 33
End: 2018-04-25
Payer: COMMERCIAL

## 2018-04-25 VITALS
DIASTOLIC BLOOD PRESSURE: 66 MMHG | WEIGHT: 185.2 LBS | SYSTOLIC BLOOD PRESSURE: 98 MMHG | BODY MASS INDEX: 31.62 KG/M2 | HEIGHT: 64 IN

## 2018-04-25 DIAGNOSIS — N91.2 ABSENCE OF MENSTRUATION: Primary | ICD-10-CM

## 2018-04-25 LAB — B-HCG SERPL-ACNC: 3 IU/L (ref 0–5)

## 2018-04-25 PROCEDURE — 36415 COLL VENOUS BLD VENIPUNCTURE: CPT | Performed by: ADVANCED PRACTICE MIDWIFE

## 2018-04-25 PROCEDURE — 84702 CHORIONIC GONADOTROPIN TEST: CPT | Performed by: ADVANCED PRACTICE MIDWIFE

## 2018-04-25 PROCEDURE — 99213 OFFICE O/P EST LOW 20 MIN: CPT | Performed by: ADVANCED PRACTICE MIDWIFE

## 2018-04-25 NOTE — NURSING NOTE
"Chief Complaint   Patient presents with     Consult     Contraception       Initial BP 98/66  Ht 5' 4\" (1.626 m)  Wt 185 lb 3.2 oz (84 kg)  LMP  (LMP Unknown)  Breastfeeding? No  BMI 31.79 kg/m2 Estimated body mass index is 31.79 kg/(m^2) as calculated from the following:    Height as of this encounter: 5' 4\" (1.626 m).    Weight as of this encounter: 185 lb 3.2 oz (84 kg).  Medication Reconciliation: complete     Stephanie Mark, HELADIO      "

## 2018-04-25 NOTE — PROGRESS NOTES
"  SUBJECTIVE:                                                   Ethel Chi is a 33 year old who presents to clinic today for the following health issue(s):  She would like to discuss Nexplanon insertion. She has been having irregular periods since removal of her Paraguard IUD in 2018. LMP 2018. Reports periods were always regular prior to having IUD removed.   Menstrual History: irregular   Patient is sexually active  .  Using nothing for contraception.       Problem list and histories reviewed & adjusted, as indicated.  Additional history: as documented.    Patient Active Problem List   Diagnosis     Major depression in complete remission (H)     Varicosities     CARDIOVASCULAR SCREENING; LDL GOAL LESS THAN 160     Vitamin D deficiency     Vitamin B12 deficiency without anemia     Fatigue     Iron deficiency     Vitamin D deficiency disease     Gingival bleeding     Past Surgical History:   Procedure Laterality Date     LAPAROSCOPIC CHOLECYSTECTOMY        Social History   Substance Use Topics     Smoking status: Never Smoker     Smokeless tobacco: Never Used     Alcohol use No      Problem (# of Occurrences) Relation (Name,Age of Onset)    Hypertension (1) Maternal Grandmother            Current Outpatient Prescriptions   Medication Sig     nitroFURantoin, macrocrystal-monohydrate, (MACROBID) 100 MG capsule Take 1 capsule (100 mg) by mouth 2 times daily     No current facility-administered medications for this visit.      Allergies   Allergen Reactions     No Known Allergies        ROS:  12 point review of systems negative other than symptoms noted below.    OBJECTIVE:     BP 98/66  Ht 5' 4\" (1.626 m)  Wt 185 lb 3.2 oz (84 kg)  LMP  (LMP Unknown)  Breastfeeding? No  BMI 31.79 kg/m2  Body mass index is 31.79 kg/(m^2).     Labs at appointment on 2018 are inconclusive for explanation of irregular cycles.    Results for orders placed or performed in visit on 18   TSH with free T4 " reflex   Result Value Ref Range    TSH 0.93 0.40 - 4.00 mU/L   HCG Quantitative Pregnancy, Blood (QLC675)   Result Value Ref Range    HCG Quantitative Serum 2 0 - 5 IU/L   Lutropin   Result Value Ref Range    Lutropin 16.8 IU/L   Follicle stimulating hormone   Result Value Ref Range    FSH 40.8 IU/L   CBC with platelets   Result Value Ref Range    WBC 8.7 4.0 - 11.0 10e9/L    RBC Count 4.62 3.8 - 5.2 10e12/L    Hemoglobin 13.8 11.7 - 15.7 g/dL    Hematocrit 42.8 35.0 - 47.0 %    MCV 93 78 - 100 fl    MCH 29.9 26.5 - 33.0 pg    MCHC 32.2 31.5 - 36.5 g/dL    RDW 14.0 10.0 - 15.0 %    Platelet Count 286 150 - 450 10e9/L   UA reflex to Microscopic and Culture   Result Value Ref Range    Color Urine Yellow     Appearance Urine Clear     Glucose Urine Negative NEG^Negative mg/dL    Bilirubin Urine Negative NEG^Negative    Ketones Urine Trace (A) NEG^Negative mg/dL    Specific Gravity Urine 1.020 1.003 - 1.035    Blood Urine Negative NEG^Negative    pH Urine 7.0 5.0 - 7.0 pH    Protein Albumin Urine Negative NEG^Negative mg/dL    Urobilinogen Urine 1.0 0.2 - 1.0 EU/dL    Nitrite Urine Negative NEG^Negative    Leukocyte Esterase Urine Trace (A) NEG^Negative    Source Midstream Urine    Urine Microscopic   Result Value Ref Range    WBC Urine 5-10 (A) OTO5^0 - 5 /HPF    RBC Urine 2-5 (A) OTO2^O - 2 /HPF    Squamous Epithelial /LPF Urine Few FEW^Few /LPF    Bacteria Urine Few (A) NEG^Negative /HPF   Urine Culture Aerobic Bacterial   Result Value Ref Range    Specimen Description Midstream Urine     Culture Micro <10,000 colonies/mL  urogenital dandre          PHYSICAL EXAM:  Constitutional:  Appearance: Well nourished, well developed alert, in no acute distress     In-Clinic Test Results:      ASSESSMENT/PLAN:                                                        ICD-10-CM    1. Absence of menstruation N91.2 HCG quantitative pregnancy     US Pelvic Complete w Transvaginal       COUNSELING:        Recommended HCG quant again  today and pelvic ultrasound due to irregular periods.    Discussed all birth control options and patient would like to proceed with Nexplanon insertion. There are no contraindications to insertion of Nexplanon. Handout provided. Recommended to come back for insertion of device after HCG and pelvic US this week.       15 minutes was spent face to face with the patient today discussing her history, diagnosis, and follow-up plan as noted above. Over 50% of the visit was spent in counseling and coordination of care.    Total Visit Time:15minutes.   Edie Gomez CNM, ERIKA-BC

## 2018-04-25 NOTE — MR AVS SNAPSHOT
After Visit Summary   4/25/2018    Ethel Chi    MRN: [id number]           Patient Information     Date Of Birth          1985        Visit Information        Provider Department      4/25/2018 3:00 PM Edie Gomez CNM; [employer] [facility]        Today's Diagnoses     Absence of menstruation    -  1       Follow-ups after your visit        Follow-up notes from your care team     Return in about 1 week (around 5/2/2018) for Nexplanon insertion.      Your next 10 appointments already scheduled     Apr 26, 2018  8:45 AM CDT   US PELVIC COMPLETE W TRANSVAGINAL with OXUS1   [facility] ([facility]    [address] [address] [phone]           Please bring a list of your medicines (including vitamins, minerals and over-the-counter drugs). Also, tell your doctor about any allergies you may have. Wear comfortable clothes and leave your valuables at home.  Adults: Drink six 8-ounce glasses of fluid one hour before your exam. Do NOT empty your bladder.  If you need to empty your bladder before your exam, try to release only a little bit of urine. Then, drink another 8oz glass of fluid.  Children: Children who are potty trained should drink at least 4 cups (32 oz) of liquid 45 minutes to one hour prior to the exam. The child s bladder must be full in order to achieve a diagnostic exam. If your child is very uncomfortable or has an urgent need to pee, please notify a technologist; they will try to find out how much longer the wait may be and provide instructions to help relieve the pressure. Occasionally it is medically necessary to insert a urinary catheter to fill the bladder.  Please call the Imaging Department at your exam site with any questions.            May 01, 2018 11:00 AM CDT   Office Visit with ROSEMARY Singh CNM   [facility]  "[facility] ([facility])    [address] [id number] [phone]           Bring a current list of meds and any records pertaining to this visit. For Physicals, please bring immunization records and any forms needing to be filled out. Please arrive 10 minutes early to complete paperwork.              Future tests that were ordered for you today     Open Future Orders        Priority Expected Expires Ordered    US Pelvic Complete w Transvaginal Routine  2019            Who to contact     If you have questions or need follow up information about today's clinic visit or your schedule please contact [facility] directly at [phone].  Normal or non-critical lab and imaging results will be communicated to you by Medminderhart, letter or phone within 4 business days after the clinic has received the results. If you do not hear from us within 7 days, please contact the clinic through Medminderhart or phone. If you have a critical or abnormal lab result, we will notify you by phone as soon as possible.  Submit refill requests through Rocket Raise or call your pharmacy and they will forward the refill request to us. Please allow 3 business days for your refill to be completed.          Additional Information About Your Visit        [employer] Information     [employer]ise lets you send messages to your doctor, view your test results, renew your prescriptions, schedule appointments and more. To sign up, go to [email or web address] . Click on \"Log in\" on the left side of the screen, which will take you to the Welcome page. Then click on \"Sign up Now\" on the right side of the page.     You will be asked to enter the access code listed below, as well as some personal information. Please follow the directions to create your username and password.     Your access code is: [email or web address]  Expires: 2018  3:48 PM     Your access code will  in 90 " "days. If you need help or a new code, please call your [facility] clinic or [phone].        Care EveryWhere ID     This is your Care EveryWhere ID. This could be used by other organizations to access your [facility] medical records  [id number]        Your Vitals Were     Height Last Period Breastfeeding? BMI (Body Mass Index)          5' 4\" (1.626 m) (LMP Unknown) No 31.79 kg/m2         Blood Pressure from Last 3 Encounters:   04/25/18 98/66   04/12/18 110/60   01/11/18 104/72    Weight from Last 3 Encounters:   04/25/18 185 lb 3.2 oz (84 kg)   04/12/18 185 lb 6.4 oz (84.1 kg)   01/11/18 195 lb (88.5 kg)              We Performed the Following     HCG quantitative pregnancy        Primary Care Provider Fax #    Physician No Ref-Primary [phone]       No address on file        Equal Access to Services     SHOLA WEST : Hadii prema babbo Socayla, waaxda luqadaha, qaybta kaalmada adedakotayada, trisha phan . So [facility] [phone].    ATENCIÓN: Si habla español, tiene a veloz disposición servicios gratuitos de asistencia lingüística. Llame al [phone].    We comply with applicable federal civil rights laws and [place] laws. We do not discriminate on the basis of race, color, national origin, age, disability, sex, sexual orientation, or gender identity.            Thank you!     Thank you for choosing [facility]  for your care. Our goal is always to provide you with excellent care. Hearing back from our patients is one way we can continue to improve our services. Please take a few minutes to complete the written survey that you may receive in the mail after your visit with us. Thank you!             Your Updated Medication List - Protect others around you: Learn how to safely use, store and throw away your medicines at www.disposemymeds.org.          This list is accurate as of 4/25/18  4:02 PM.  Always use your most recent med list.                   Brand " Name Dispense Instructions for use Diagnosis    nitroFURantoin (macrocrystal-monohydrate) 100 MG capsule    MACROBID    14 capsule    Take 1 capsule (100 mg) by mouth 2 times daily    Lower urinary tract symptoms (LUTS)

## 2018-04-26 ENCOUNTER — RADIANT APPOINTMENT (OUTPATIENT)
Dept: ULTRASOUND IMAGING | Facility: CLINIC | Age: 33
End: 2018-04-26
Attending: ADVANCED PRACTICE MIDWIFE
Payer: COMMERCIAL

## 2018-04-26 DIAGNOSIS — N91.2 ABSENCE OF MENSTRUATION: ICD-10-CM

## 2018-04-26 PROCEDURE — 76856 US EXAM PELVIC COMPLETE: CPT | Performed by: OBSTETRICS & GYNECOLOGY

## 2018-04-26 PROCEDURE — 76830 TRANSVAGINAL US NON-OB: CPT | Performed by: OBSTETRICS & GYNECOLOGY

## 2018-05-01 ENCOUNTER — TELEPHONE (OUTPATIENT)
Dept: OBGYN | Facility: CLINIC | Age: 33
End: 2018-05-01

## 2018-05-01 ENCOUNTER — OFFICE VISIT (OUTPATIENT)
Dept: OBGYN | Facility: CLINIC | Age: 33
End: 2018-05-01
Payer: COMMERCIAL

## 2018-05-01 VITALS
DIASTOLIC BLOOD PRESSURE: 62 MMHG | WEIGHT: 183.5 LBS | BODY MASS INDEX: 31.33 KG/M2 | HEIGHT: 64 IN | SYSTOLIC BLOOD PRESSURE: 88 MMHG

## 2018-05-01 DIAGNOSIS — N91.2 ABSENCE OF MENSTRUATION: Primary | ICD-10-CM

## 2018-05-01 DIAGNOSIS — E66.811 CLASS 1 OBESITY WITHOUT SERIOUS COMORBIDITY WITH BODY MASS INDEX (BMI) OF 31.0 TO 31.9 IN ADULT, UNSPECIFIED OBESITY TYPE: ICD-10-CM

## 2018-05-01 LAB
B-HCG SERPL-ACNC: 3 IU/L (ref 0–5)
FSH SERPL-ACNC: 46.6 IU/L
LH SERPL-ACNC: 31 IU/L
PROLACTIN SERPL-MCNC: 4 UG/L (ref 3–27)

## 2018-05-01 PROCEDURE — 83002 ASSAY OF GONADOTROPIN (LH): CPT | Performed by: ADVANCED PRACTICE MIDWIFE

## 2018-05-01 PROCEDURE — 84702 CHORIONIC GONADOTROPIN TEST: CPT | Performed by: ADVANCED PRACTICE MIDWIFE

## 2018-05-01 PROCEDURE — 36415 COLL VENOUS BLD VENIPUNCTURE: CPT | Performed by: ADVANCED PRACTICE MIDWIFE

## 2018-05-01 PROCEDURE — 84146 ASSAY OF PROLACTIN: CPT | Performed by: ADVANCED PRACTICE MIDWIFE

## 2018-05-01 PROCEDURE — 99214 OFFICE O/P EST MOD 30 MIN: CPT | Performed by: ADVANCED PRACTICE MIDWIFE

## 2018-05-01 PROCEDURE — 83001 ASSAY OF GONADOTROPIN (FSH): CPT | Performed by: ADVANCED PRACTICE MIDWIFE

## 2018-05-01 RX ORDER — NORETHINDRONE ACETATE AND ETHINYL ESTRADIOL 1MG-20(21)
1 KIT ORAL DAILY
Qty: 28 TABLET | Refills: 0 | Status: SHIPPED | OUTPATIENT
Start: 2018-05-01 | End: 2018-05-30

## 2018-05-01 NOTE — PROGRESS NOTES
"  SUBJECTIVE:                                                   Ethel Chi is a 33 year old who presents to clinic today for the following health issue(s): Amenorrhea and desiring contraception. Ethel was seen in the office last week reporting amenorrhea x 4 months. HCG and pelvic US ordered at that time. Pelvic US normal. HCG 3 IU/L on 4/25/2018. Upon further questioning today, patient reports that she did start taking weight loss medications 4 years ago. She had been taking them irregularly until 5 months ago when she began taking them daily. She does not know the name of the medications or what is in them. Reports that she began having irregular cycles after starting the medications. States that it is prescribed at a \"weight loss clinic\" that does not have any name/website/etc. This location does not take insurance and she doesn't know the name of the provider. She does report that her friend also goes to this clinic and after several months of taking the medications was diagnosed with infertility by her OBGYN MD.     She did stop taking the weight loss medications 4 weeks ago.     LMP Unknown, approx 4-5 months ago    Denies hx of liver disease, migraine or thrombophlebitis   Denies fam hx of cancers or thrombophlebitis      Patient Active Problem List   Diagnosis     Major depression in complete remission (H)     Varicosities     CARDIOVASCULAR SCREENING; LDL GOAL LESS THAN 160     Vitamin D deficiency     Vitamin B12 deficiency without anemia     Fatigue     Iron deficiency     Vitamin D deficiency disease     Gingival bleeding     Past Surgical History:   Procedure Laterality Date     LAPAROSCOPIC CHOLECYSTECTOMY  2003      Social History   Substance Use Topics     Smoking status: Never Smoker     Smokeless tobacco: Never Used     Alcohol use No      Problem (# of Occurrences) Relation (Name,Age of Onset)    Hypertension (1) Maternal Grandmother            Current Outpatient Prescriptions   Medication Sig " "    norethindrone-ethinyl estradiol (JUNEL FE 1/20) 1-20 MG-MCG per tablet Take 1 tablet by mouth daily     nitroFURantoin, macrocrystal-monohydrate, (MACROBID) 100 MG capsule Take 1 capsule (100 mg) by mouth 2 times daily (Patient not taking: Reported on 5/1/2018)     No current facility-administered medications for this visit.      Allergies   Allergen Reactions     No Known Allergies        ROS:  12 point review of systems negative other than symptoms noted below.    OBJECTIVE:     BP (!) 88/62  Ht 5' 4\" (1.626 m)  Wt 183 lb 8 oz (83.2 kg)  LMP  (LMP Unknown)  BMI 31.5 kg/m2  Body mass index is 31.5 kg/(m^2).    PHYSICAL EXAM:    Neck:  Lymph Nodes:  No lymphadenopathy present; Thyroid:  Gland size normal, nontender, no nodules or masses present on palpation  Chest:  Respiratory Effort:  Breathing unlabored  Neurologic/Psychiatric:  Mental Status:  Oriented X3      In-Clinic Test Results:  Office Visit on 04/25/2018   Component Date Value Ref Range Status     HCG Quantitative Serum 04/25/2018 3  0 - 5 IU/L Final   FSH and LH drawn 04/12/2018 are indicative of possible premature ovarian failure.     ASSESSMENT/PLAN:                                                        ICD-10-CM    1. Absence of menstruation N91.2 Follicle stimulating hormone     Lutropin     Prolactin     HCG quantitative pregnancy     norethindrone-ethinyl estradiol (JUNEL FE 1/20) 1-20 MG-MCG per tablet     HCG quantitative pregnancy   2. Class 1 obesity without serious comorbidity with body mass index (BMI) of 31.0 to 31.9 in adult, unspecified obesity type E66.9 WEIGHT MANAGEMENT/ P LIFESTYLE PROGRAM REFERRAL    Z68.31      HCG, testosterone, prolactin, LH, FSH levels today  Begin OCP to rule out pituitary cause of serum HCG. Draw another HCG level 3 weeks after starting OCP. Anticipate withdrawal bleed after 3 weeks of continuous OCP.   Review pelvic US results in office today. US results are normal.   If all labs are normal after 3 " weeks on OCP can consider placement of Nexplanon at that time.   Will follow up with results. Possible consultation/referral to OB/GYN MD  Strongly encouraged to stop treatment with weight loss center and stop all weight loss supplements  At patient's request, referral to [facility]      COUNSELING:  The use of the oral contraceptive pill has been fully discussed with the patient. This includes the proper method to initiate  and continue the pill, the need for regular compliance to ensure adequate contraceptive effect, the physiology which makes the pill effective, the instructions for what to do in event of a missed pill, and warnings about anticipated minor side effects such as breakthrough spotting, nausea, breast tenderness, weight changes, acne, headaches, etc. She was informed of the irregular bleeding pattern that can occur when the pill is first started or a new form is changed over for the first 2-3 months.  She has been told of the more serious potential side effects such as MI, stroke, and deep vein thrombosis, all of which are very unlikely.  She has been asked to report any signs of such serious problems immediately.   She understands and wishes to take the medication as prescribed.    RETURN TO CLINIC in 3 weeks for labs and possible Nexplanon insertion vs. Contraceptive management    25 minutes was spent face to face with the patient today discussing her history, diagnosis, and follow-up plan as noted above. Over 50% of the visit was spent in counseling and coordination of care of amenorrhea.    Edie Gomez CNM, HENRIQUE-BC

## 2018-05-01 NOTE — NURSING NOTE
"Chief Complaint   Patient presents with     Consult     Contraception       Initial BP (!) 88/62  Ht 5' 4\" (1.626 m)  Wt 183 lb 8 oz (83.2 kg)  LMP  (LMP Unknown)  BMI 31.5 kg/m2 Estimated body mass index is 31.5 kg/(m^2) as calculated from the following:    Height as of this encounter: 5' 4\" (1.626 m).    Weight as of this encounter: 183 lb 8 oz (83.2 kg).  Medication Reconciliation: complete     Stephanie Mark, HELADIO      "

## 2018-05-01 NOTE — PATIENT INSTRUCTIONS
Return to clinic in 3 weeks for repeat HCG and insertion of Nexplanon at that time if all results are normal.   Stop taking all weight loss supplements        Birth Control Pills    Combination birth control pills contain both estrogen and progestin.  There are numerous brands of birth control pills otherwise known as oral contraceptive pills (OCP's).  Each brand has a different combination of estrogen and progestin so every woman can find the one that is right for her.  OCP's are a safe and effective way to prevent pregnancy in most women.    How do OCP's work  OCP's work by several different mechanisms.  They cause changes in the cervix and the lining of the uterus.  The cervical mucus becomes thicker which will prevent the sperm from entering the cervix.  The lining of the uterus becomes thin which helps prevent an egg from attaching to it.  In combination, these events make it unlikely that you will get pregnant. It may also prevent ovulation completely.    Benefits of OCP's  May reduce your risk of:  Cancer of the uterus and ovary, ovarian cysts, pelvic infection, bone loss, benign breast disease, anemia, ectopic pregnancy and acne.  It may also decrease symptoms of PCOS (Polycystic Ovarian Syndrome). OCP's may also improve cramping during menstrual cycle and may make you cycle shorter and lighter.    How to take OCP's  You have several choices on how to start taking your OCP's:    You can start the pill on the first day of your next period    You can start the pill on the Sunday after your next period starts    You can start the pill on the first day it was prescribed no matter where you are in your cycle.  In this case, you will need to make sure you are not pregnant.    No matter when you start your first pack, you will always start your next pack on the same day you started your first pack.    You should take the pill at the same time every day.  Do not skip any pills.  If you miss any pills, are taking  antibiotics or vomit, use a backup method of birth control until you get your next period.    Pills come in packs of 21, 28 or 91 pills:      21 Pills:  Take one pill at the same time every day for 21 days.  Wait 7 days before beginning your next pack.  During these 7 days you will have your period.    28 Pills:  Take one pill at the same time every day for 28 days.  The last 7 pills in the pack do not contain estrogen/progestin.  During these 7 days you will have your period.      91 Pills:  Take one pill at the same time every day for 91 days.  The last 7 pills in the pack do not contain estrogen/progestin.  During these 7 days you will have your period.  With this method you will only have 4 periods a year.  Some women eventually have no bleeding at all.    Each pill pack comes with instructions.  Please make sure you read them and understand these instructions.      What to do if you miss a pill    Occasionally you may forget to take a pill or not take it on time.  Take the missed pill as soon as you remember.  Take the next pill at the regular time.  It is ok if you take two pills in one day.  You may feel a bit queasy or have some spotting, this is normal and should not be concerning.  If you have missed more than one pill use a back up method of birth control and call the clinic for instructions on how to proceed.    Who should not take Combined OCP's    If you have a history or have blood clots    A history of cerebral vascular accident (stroke)    If you have ischemic heart or coronary artery disease    Known of suspected breast cancer    Known or suspected pregnancy    Smoker and over age 35    Any know liver abnormality    Migraine headaches with an aura    Undiagnosed abnormal vaginal bleeding    High blood pressure    Common side effects when starting OCP's  Headache, nausea, dizziness, breakthrough bleeding, missed periods, tender breasts, depression and anxiety.  Most side effects are minor and  resolve in the first few months. Take the pill with meals or at bedtime if nausea occurs.    Call or return for care in the following circumstances:      Unexpected missed periods or very heavy bleeding    Persistent vaginal bleeding    Depression    Suspected pregnancy    Persistent side effects such as:  Nausea, irregular menses or mood changes.    Seek emergency care immediately for the following:  ACHES    Abdominal or pelvic pain    Chest pain    Severe headache     Visual disturbances    Severe leg pain or numbness or tingling of extremities    Lastly-    Use of a backup method is recommended for the first cycle    Condoms are recommended to protect against STI's    OCP's are 99% effective if take correctly.    The pill helps to keep your periods regular, lighter and shorter and reduces cramps.  If you desire a pregnancy, you may stop taking your OCPs.     Please call the clinic with questions and concerns  Matt Kurtz  [phone]

## 2018-05-01 NOTE — TELEPHONE ENCOUNTER
Please read u/s asap.    Pt coming for appt today for possible nexplanon, we need u/s result prior.      Marichuy CHOU R.N.  [facility]

## 2018-05-01 NOTE — MR AVS SNAPSHOT
After Visit Summary   5/1/2018    Ethel Chi    MRN: [id number]           Patient Information     Date Of Birth          1985        Visit Information        Provider Department      5/1/2018 10:45 AM Edie Gomez CNM; [employer] [facility]        Today's Diagnoses     Absence of menstruation    -  1      Care Instructions    Return to clinic in 3 weeks for repeat HCG and insertion of Nexplanon at that time if all results are normal.   Stop taking all weight loss supplements        Birth Control Pills    Combination birth control pills contain both estrogen and progestin.  There are numerous brands of birth control pills otherwise known as oral contraceptive pills (OCP's).  Each brand has a different combination of estrogen and progestin so every woman can find the one that is right for her.  OCP's are a safe and effective way to prevent pregnancy in most women.    How do OCP's work  OCP's work by several different mechanisms.  They cause changes in the cervix and the lining of the uterus.  The cervical mucus becomes thicker which will prevent the sperm from entering the cervix.  The lining of the uterus becomes thin which helps prevent an egg from attaching to it.  In combination, these events make it unlikely that you will get pregnant. It may also prevent ovulation completely.    Benefits of OCP's  May reduce your risk of:  Cancer of the uterus and ovary, ovarian cysts, pelvic infection, bone loss, benign breast disease, anemia, ectopic pregnancy and acne.  It may also decrease symptoms of PCOS (Polycystic Ovarian Syndrome). OCP's may also improve cramping during menstrual cycle and may make you cycle shorter and lighter.    How to take OCP's  You have several choices on how to start taking your OCP's:    You can start the pill on the first day of your next period    You can start the pill on the Sunday after your next period  starts    You can start the pill on the first day it was prescribed no matter where you are in your cycle.  In this case, you will need to make sure you are not pregnant.    No matter when you start your first pack, you will always start your next pack on the same day you started your first pack.    You should take the pill at the same time every day.  Do not skip any pills.  If you miss any pills, are taking antibiotics or vomit, use a backup method of birth control until you get your next period.    Pills come in packs of 21, 28 or 91 pills:      21 Pills:  Take one pill at the same time every day for 21 days.  Wait 7 days before beginning your next pack.  During these 7 days you will have your period.    28 Pills:  Take one pill at the same time every day for 28 days.  The last 7 pills in the pack do not contain estrogen/progestin.  During these 7 days you will have your period.      91 Pills:  Take one pill at the same time every day for 91 days.  The last 7 pills in the pack do not contain estrogen/progestin.  During these 7 days you will have your period.  With this method you will only have 4 periods a year.  Some women eventually have no bleeding at all.    Each pill pack comes with instructions.  Please make sure you read them and understand these instructions.      What to do if you miss a pill    Occasionally you may forget to take a pill or not take it on time.  Take the missed pill as soon as you remember.  Take the next pill at the regular time.  It is ok if you take two pills in one day.  You may feel a bit queasy or have some spotting, this is normal and should not be concerning.  If you have missed more than one pill use a back up method of birth control and call the clinic for instructions on how to proceed.    Who should not take Combined OCP's    If you have a history or have blood clots    A history of cerebral vascular accident (stroke)    If you have ischemic heart or coronary artery  disease    Known of suspected breast cancer    Known or suspected pregnancy    Smoker and over age 35    Any know liver abnormality    Migraine headaches with an aura    Undiagnosed abnormal vaginal bleeding    High blood pressure    Common side effects when starting OCP's  Headache, nausea, dizziness, breakthrough bleeding, missed periods, tender breasts, depression and anxiety.  Most side effects are minor and resolve in the first few months. Take the pill with meals or at bedtime if nausea occurs.    Call or return for care in the following circumstances:      Unexpected missed periods or very heavy bleeding    Persistent vaginal bleeding    Depression    Suspected pregnancy    Persistent side effects such as:  Nausea, irregular menses or mood changes.    Seek emergency care immediately for the following:  ACHES    Abdominal or pelvic pain    Chest pain    Severe headache     Visual disturbances    Severe leg pain or numbness or tingling of extremities    Lastly-    Use of a backup method is recommended for the first cycle    Condoms are recommended to protect against STI's    OCP's are 99% effective if take correctly.    The pill helps to keep your periods regular, lighter and shorter and reduces cramps.  If you desire a pregnancy, you may stop taking your OCPs.     Please call the clinic with questions and concerns  [facility]  [phone]            Follow-ups after your visit        Follow-up notes from your care team     Return in about 3 weeks (around 5/22/2018) for Lab Work, Follow up.      Future tests that were ordered for you today     Open Future Orders        Priority Expected Expires Ordered    HCG quantitative pregnancy Routine 5/23/2018 5/1/2019 5/1/2018            Who to contact     If you have questions or need follow up information about today's clinic visit or your schedule please contact [facility] directly at [phone].  Normal or non-critical lab and imaging  "results will be communicated to you by MyChart, letter or phone within 4 business days after the clinic has received the results. If you do not hear from us within 7 days, please contact the clinic through [employer] or phone. If you have a critical or abnormal lab result, we will notify you by phone as soon as possible.  Submit refill requests through [employer] or call your pharmacy and they will forward the refill request to us. Please allow 3 business days for your refill to be completed.          Additional Information About Your Visit        [employer] Information     [employer] lets you send messages to your doctor, view your test results, renew your prescriptions, schedule appointments and more. To sign up, go to [email or web address] . Click on \"Log in\" on the left side of the screen, which will take you to the Welcome page. Then click on \"Sign up Now\" on the right side of the page.     You will be asked to enter the access code listed below, as well as some personal information. Please follow the directions to create your username and password.     Your access code is: [email or web address]  Expires: 2018  3:48 PM     Your access code will  in 90 days. If you need help or a new code, please call your [place] clinic or [phone].        Care EveryWhere ID     This is your Care EveryWhere ID. This could be used by other organizations to access your [facility] medical records  [id number]        Your Vitals Were     Height Last Period BMI (Body Mass Index)             5' 4\" (1.626 m) (LMP Unknown) 31.5 kg/m2          Blood Pressure from Last 3 Encounters:   18 (!) 88/62   18 98/66   18 110/60    Weight from Last 3 Encounters:   18 183 lb 8 oz (83.2 kg)   18 185 lb 3.2 oz (84 kg)   18 185 lb 6.4 oz (84.1 kg)              We Performed the Following     Follicle stimulating hormone     HCG quantitative pregnancy     Lutropin     Prolactin          Today's Medication Changes        "   These changes are accurate as of 5/1/18 11:28 AM.  If you have any questions, ask your nurse or doctor.               Start taking these medicines.        Dose/Directions    norethindrone-ethinyl estradiol 1-20 MG-MCG per tablet   Commonly known as:  JUNEL FE 1/20   Used for:  Absence of menstruation   Started by:  Edie Gomez CNM        Dose:  1 tablet   Take 1 tablet by mouth daily   Quantity:  28 tablet   Refills:  0            Where to get your medicines      Some of these will need a paper prescription and others can be bought over the counter.  Ask your nurse if you have questions.     Bring a paper prescription for each of these medications     norethindrone-ethinyl estradiol 1-20 MG-MCG per tablet                Primary Care Provider Fax #    Physician No Ref-Primary [phone]       No address on file        Equal Access to Services     SHOLA WEST : Sergei Chauhan, faizan emmanuel, sarai pierce, trisha phan . So [facility] [phone].    ATENCIÓN: Si habla español, tiene a veloz disposición servicios gratuitos de asistencia lingüística. Jadiel al [phone].    We comply with applicable federal civil rights laws and [place] laws. We do not discriminate on the basis of race, color, national origin, age, disability, sex, sexual orientation, or gender identity.            Thank you!     Thank you for choosing [facility]  for your care. Our goal is always to provide you with excellent care. Hearing back from our patients is one way we can continue to improve our services. Please take a few minutes to complete the written survey that you may receive in the mail after your visit with us. Thank you!             Your Updated Medication List - Protect others around you: Learn how to safely use, store and throw away your medicines at www.disposemymeds.org.          This list is accurate as of 5/1/18 11:28 AM.  Always  use your most recent med list.                   Brand Name Dispense Instructions for use Diagnosis    nitroFURantoin (macrocrystal-monohydrate) 100 MG capsule    MACROBID    14 capsule    Take 1 capsule (100 mg) by mouth 2 times daily    Lower urinary tract symptoms (LUTS)       norethindrone-ethinyl estradiol 1-20 MG-MCG per tablet    JUNEL FE 1/20    28 tablet    Take 1 tablet by mouth daily    Absence of menstruation

## 2018-05-02 ENCOUNTER — TELEPHONE (OUTPATIENT)
Dept: OBGYN | Facility: CLINIC | Age: 33
End: 2018-05-02

## 2018-05-02 ENCOUNTER — NURSE TRIAGE (OUTPATIENT)
Dept: NURSING | Facility: CLINIC | Age: 33
End: 2018-05-02

## 2018-05-02 DIAGNOSIS — N91.2 ABSENCE OF MENSTRUATION: Primary | ICD-10-CM

## 2018-05-02 NOTE — TELEPHONE ENCOUNTER
Called Ethel to inform of lab results. FSH and LH remain abnormally elevated. Consulted with Dr. Purvis and she agrees with the plan. Patient to continue OCP x 4 weeks. Anticipate withdrawal bleed in 4th week. Patient to come in for lab work 1 day prior to follow up appointment.     Lisa  was on the line for this call. Patient did not answer. Left a message that she could call back.     Edie Gomez CNM, WHHENRIQUE-BC

## 2018-05-03 NOTE — TELEPHONE ENCOUNTER
Clinic Action Needed: Yes, call back  FNA Triage Call  Presenting Problem:    Patient called reporting that she received a voicemail from Edie Gomez CNM, NP-BC. Relayed message to patient via [employer] . Patient agreed with plan and requested a call back from Edie to discuss possible reasons why her FSH and LH are elevated. Please call Ethel at [phone].    Routed to:HARLAN Iniguez RN/[facility]

## 2018-05-03 NOTE — TELEPHONE ENCOUNTER
Edie, do you want pt to make appt to discuss?  Or please advise or cb pt.    Marichuy CHOU R.N.  [facility]

## 2018-05-03 NOTE — TELEPHONE ENCOUNTER
"Patient called reporting that she received a voicemail from Edie Gomez CNM, RICARDO. Relayed message (see below) to patient via Malian . Patient agreed with plan and requested a call back from Edie to discuss possible reasons why her FSH and LH are elevated. This writer reported she will send a message to her provider. Patient did not have any further questions. RN advised to call back with any  questions or concerns.       \"Called Ethel to inform of lab results. FSH and LH remain abnormally elevated. Consulted with Dr. Purvis and she agrees with the plan. Patient to continue OCP x 4 weeks. Anticipate withdrawal bleed in 4th week. Patient to come in for lab work 1 day prior to follow up appointment.      Malian  was on the line for this call. Patient did not answer. Left a message that she could call back.      Edie Gomez CNM, RICARDO\"    Michell Iniguez RN/[facility]    "

## 2018-05-07 NOTE — TELEPHONE ENCOUNTER
Attempted to call patient to discuss lab results and future plan with [facility] . No response. Have her call routed to me please if she calls back.   Edie Gomez CNM, HENRIQUE-BC

## 2018-05-30 ENCOUNTER — OFFICE VISIT (OUTPATIENT)
Dept: OBGYN | Facility: CLINIC | Age: 33
End: 2018-05-30
Payer: COMMERCIAL

## 2018-05-30 VITALS
BODY MASS INDEX: 30.9 KG/M2 | HEIGHT: 64 IN | SYSTOLIC BLOOD PRESSURE: 110 MMHG | DIASTOLIC BLOOD PRESSURE: 66 MMHG | WEIGHT: 181 LBS

## 2018-05-30 DIAGNOSIS — Z30.41 SURVEILLANCE OF PREVIOUSLY PRESCRIBED CONTRACEPTIVE PILL: Primary | ICD-10-CM

## 2018-05-30 DIAGNOSIS — G44.209 TENSION HEADACHE: ICD-10-CM

## 2018-05-30 PROCEDURE — 99213 OFFICE O/P EST LOW 20 MIN: CPT | Performed by: NURSE PRACTITIONER

## 2018-05-30 RX ORDER — NORETHINDRONE ACETATE AND ETHINYL ESTRADIOL 1MG-20(21)
1 KIT ORAL DAILY
Qty: 28 TABLET | Refills: 2 | Status: SHIPPED | OUTPATIENT
Start: 2018-05-30 | End: 2018-08-16

## 2018-05-30 RX ORDER — IBUPROFEN 600 MG/1
600 TABLET, FILM COATED ORAL EVERY 6 HOURS PRN
Qty: 100 TABLET | Refills: 1 | Status: SHIPPED | OUTPATIENT
Start: 2018-05-30 | End: 2018-08-18

## 2018-05-30 NOTE — PATIENT INSTRUCTIONS
Birth Control Pills    Combination birth control pills contain both estrogen and progestin.  There are numerous brands of birth control pills otherwise known as oral contraceptive pills (OCP's).  Each brand has a different combination of estrogen and progestin so every woman can find the one that is right for her.  OCP's are a safe and effective way to prevent pregnancy in most women.    How do OCP's work  OCP's work by several different mechanisms.  They cause changes in the cervix and the lining of the uterus.  The cervical mucus becomes thicker which will prevent the sperm from entering the cervix.  The lining of the uterus becomes thin which helps prevent an egg from attaching to it.  In combination, these events make it unlikely that you will get pregnant. It may also prevent ovulation completely.    Benefits of OCP's  May reduce your risk of:  Cancer of the uterus and ovary, ovarian cysts, pelvic infection, bone loss, benign breast disease, anemia, ectopic pregnancy and acne.  It may also decrease symptoms of PCOS (Polycystic Ovarian Syndrome). OCP's may also improve cramping during menstrual cycle and may make you cycle shorter and lighter.    How to take OCP's  You have several choices on how to start taking your OCP's:    You can start the pill on the first day of your next period    You can start the pill on the Sunday after your next period starts    You can start the pill on the first day it was prescribed no matter where you are in your cycle.  In this case, you will need to make sure you are not pregnant.    No matter when you start your first pack, you will always start your next pack on the same day you started your first pack.    You should take the pill at the same time every day.  Do not skip any pills.  If you miss any pills, are taking antibiotics or vomit, use a backup method of birth control until you get your next period.    Pills come in packs of 21, 28 or 91 pills:      21 Pills:  Take one  pill at the same time every day for 21 days.  Wait 7 days before beginning your next pack.  During these 7 days you will have your period.    28 Pills:  Take one pill at the same time every day for 28 days.  The last 7 pills in the pack do not contain estrogen/progestin.  During these 7 days you will have your period.      91 Pills:  Take one pill at the same time every day for 91 days.  The last 7 pills in the pack do not contain estrogen/progestin.  During these 7 days you will have your period.  With this method you will only have 4 periods a year.  Some women eventually have no bleeding at all.    Each pill pack comes with instructions.  Please make sure you read them and understand these instructions.      What to do if you miss a pill    Occasionally you may forget to take a pill or not take it on time.  Take the missed pill as soon as you remember.  Take the next pill at the regular time.  It is ok if you take two pills in one day.  You may feel a bit queasy or have some spotting, this is normal and should not be concerning.  If you have missed more than one pill use a back up method of birth control and call the clinic for instructions on how to proceed.    Who should not take Combined OCP's    If you have a history or have blood clots    A history of cerebral vascular accident (stroke)    If you have ischemic heart or coronary artery disease    Known of suspected breast cancer    Known or suspected pregnancy    Smoker and over age 35    Any know liver abnormality    Migraine headaches with an aura    Undiagnosed abnormal vaginal bleeding    High blood pressure    Common side effects when starting OCP's  Headache, nausea, dizziness, breakthrough bleeding, missed periods, tender breasts, depression and anxiety.  Most side effects are minor and resolve in the first few months. Take the pill with meals or at bedtime if nausea occurs.    Call or return for care in the following circumstances:      Unexpected  missed periods or very heavy bleeding    Persistent vaginal bleeding    Depression    Suspected pregnancy    Persistent side effects such as:  Nausea, irregular menses or mood changes.    Seek emergency care immediately for the following:  ACHES    Abdominal or pelvic pain    Chest pain    Severe headache     Visual disturbances    Severe leg pain or numbness or tingling of extremities    Lastly-    Use of a backup method is recommended for the first cycle    Condoms are recommended to protect against STI's    OCP's are 99% effective if take correctly.    The pill helps to keep your periods regular, lighter and shorter and reduces cramps.  If you desire a pregnancy, you may stop taking your OCPs.     Please call the clinic with questions and concerns  [facility]  [phone]

## 2018-05-30 NOTE — PROGRESS NOTES
"  SUBJECTIVE:                                                   Ethel Chi is a 33 year old who presents to clinic today for the following health issue(s):  No chief complaint on file.      HPI:  Patient states that she has been on COCs for 3 weeks; states that during her placebo pills this week she had her first cycle in 4 months.  Is stating that she is having intermittent hot flashes and has had 2 \"tension headaches\" that resolved with OTC Motrin.      Patient's last menstrual period was 2018 (exact date).  Menstrual History: irregular   Patient is sexually active  .  Using oral contraceptives and condoms for contraception.   Health maintenance updated:  yes  STI infx testing offered:  Declined    Last PHQ-9 score on record =   PHQ-9 SCORE 10/16/2012   Total Score 0     Last GAD7 score on record = No flowsheet data found.  Alcohol Score = 1    Problem list and histories reviewed & adjusted, as indicated.  Additional history: as documented.    Patient Active Problem List   Diagnosis     Major depression in complete remission (H)     Varicosities     CARDIOVASCULAR SCREENING; LDL GOAL LESS THAN 160     Vitamin D deficiency     Vitamin B12 deficiency without anemia     Fatigue     Iron deficiency     Vitamin D deficiency disease     Gingival bleeding     Past Surgical History:   Procedure Laterality Date     LAPAROSCOPIC CHOLECYSTECTOMY        Social History   Substance Use Topics     Smoking status: Never Smoker     Smokeless tobacco: Never Used     Alcohol use No      Problem (# of Occurrences) Relation (Name,Age of Onset)    Hypertension (1) Maternal Grandmother            Current Outpatient Prescriptions   Medication Sig     ibuprofen (ADVIL/MOTRIN) 600 MG tablet Take 1 tablet (600 mg) by mouth every 6 hours as needed for moderate pain     norethindrone-ethinyl estradiol (JUNEL FE 1/20) 1-20 MG-MCG per tablet Take 1 tablet by mouth daily     [DISCONTINUED] norethindrone-ethinyl estradiol " "(JUNEL FE 1/20) 1-20 MG-MCG per tablet Take 1 tablet by mouth daily     No current facility-administered medications for this visit.      Allergies   Allergen Reactions     No Known Allergies        ROS:  12 point review of systems negative other than symptoms noted below.    OBJECTIVE:     /66 (BP Location: Right arm, Patient Position: Chair, Cuff Size: Adult Regular)  Ht 5' 4\" (1.626 m)  Wt 181 lb (82.1 kg)  LMP 05/25/2018 (Exact Date)  Breastfeeding? No  BMI 31.07 kg/m2  Body mass index is 31.07 kg/(m^2).    PHYSICAL EXAM:  Constitutional:  Appearance: Well nourished, well developed alert, in no acute distress  Chest:  Respiratory Effort:  Breathing unlabored  Neurologic/Psychiatric:  Mental Status:  Oriented X3      In-Clinic Test Results:  No results found for this or any previous visit (from the past 24 hour(s)).    ASSESSMENT/PLAN:                                                        ICD-10-CM    1. Surveillance of previously prescribed contraceptive pill Z30.41    2. Tension headache G44.209 ibuprofen (ADVIL/MOTRIN) 600 MG tablet       COUNSELING:  Patient desires to continue taking OCP at this time; declines Nexplanon placement at this time.   The use of the oral contraceptive pill has been fully discussed with the patient. This includes the proper method to initiate  and continue the pill, the need for regular compliance to ensure adequate contraceptive effect, the physiology which makes the pill effective, the instructions for what to do in event of a missed pill, and warnings about anticipated minor side effects such as breakthrough spotting, nausea, breast tenderness, weight changes, acne, headaches, etc. She was informed of the irregular bleeding pattern that can occur when the pill is first started or a new form is changed over for the first 2-3 months.  She has been told of the more serious potential side effects such as MI, stroke, and deep vein thrombosis, all of which are very " unlikely.  She has been asked to report any signs of such serious problems immediately.   She understands and wishes to take the medication as prescribed.  Ibuprofen prescribed for headache; counseled to notify clinic if headaches persist, worsen.    return to clinic 3 months for birth control f/u      present for entire visit      Carissa RILEY CNM

## 2018-05-30 NOTE — MR AVS SNAPSHOT
After Visit Summary   5/30/2018    Ethel Chi    MRN: [id number]           Patient Information     Date Of Birth          1985        Visit Information        Provider Department      5/30/2018 3:30 PM Carissa Benitez APRN CNM; [facility] [employer] [facility]        Today's Diagnoses     Surveillance of previously prescribed contraceptive pill    -  1    Absence of menstruation        Tension headache          Care Instructions    Birth Control Pills    Combination birth control pills contain both estrogen and progestin.  There are numerous brands of birth control pills otherwise known as oral contraceptive pills (OCP's).  Each brand has a different combination of estrogen and progestin so every woman can find the one that is right for her.  OCP's are a safe and effective way to prevent pregnancy in most women.    How do OCP's work  OCP's work by several different mechanisms.  They cause changes in the cervix and the lining of the uterus.  The cervical mucus becomes thicker which will prevent the sperm from entering the cervix.  The lining of the uterus becomes thin which helps prevent an egg from attaching to it.  In combination, these events make it unlikely that you will get pregnant. It may also prevent ovulation completely.    Benefits of OCP's  May reduce your risk of:  Cancer of the uterus and ovary, ovarian cysts, pelvic infection, bone loss, benign breast disease, anemia, ectopic pregnancy and acne.  It may also decrease symptoms of PCOS (Polycystic Ovarian Syndrome). OCP's may also improve cramping during menstrual cycle and may make you cycle shorter and lighter.    How to take OCP's  You have several choices on how to start taking your OCP's:    You can start the pill on the first day of your next period    You can start the pill on the Sunday after your next period starts    You can start the pill on the first day it was prescribed no matter  where you are in your cycle.  In this case, you will need to make sure you are not pregnant.    No matter when you start your first pack, you will always start your next pack on the same day you started your first pack.    You should take the pill at the same time every day.  Do not skip any pills.  If you miss any pills, are taking antibiotics or vomit, use a backup method of birth control until you get your next period.    Pills come in packs of 21, 28 or 91 pills:      21 Pills:  Take one pill at the same time every day for 21 days.  Wait 7 days before beginning your next pack.  During these 7 days you will have your period.    28 Pills:  Take one pill at the same time every day for 28 days.  The last 7 pills in the pack do not contain estrogen/progestin.  During these 7 days you will have your period.      91 Pills:  Take one pill at the same time every day for 91 days.  The last 7 pills in the pack do not contain estrogen/progestin.  During these 7 days you will have your period.  With this method you will only have 4 periods a year.  Some women eventually have no bleeding at all.    Each pill pack comes with instructions.  Please make sure you read them and understand these instructions.      What to do if you miss a pill    Occasionally you may forget to take a pill or not take it on time.  Take the missed pill as soon as you remember.  Take the next pill at the regular time.  It is ok if you take two pills in one day.  You may feel a bit queasy or have some spotting, this is normal and should not be concerning.  If you have missed more than one pill use a back up method of birth control and call the clinic for instructions on how to proceed.    Who should not take Combined OCP's    If you have a history or have blood clots    A history of cerebral vascular accident (stroke)    If you have ischemic heart or coronary artery disease    Known of suspected breast cancer    Known or suspected pregnancy    Smoker  and over age 35    Any know liver abnormality    Migraine headaches with an aura    Undiagnosed abnormal vaginal bleeding    High blood pressure    Common side effects when starting OCP's  Headache, nausea, dizziness, breakthrough bleeding, missed periods, tender breasts, depression and anxiety.  Most side effects are minor and resolve in the first few months. Take the pill with meals or at bedtime if nausea occurs.    Call or return for care in the following circumstances:      Unexpected missed periods or very heavy bleeding    Persistent vaginal bleeding    Depression    Suspected pregnancy    Persistent side effects such as:  Nausea, irregular menses or mood changes.    Seek emergency care immediately for the following:  ACHES    Abdominal or pelvic pain    Chest pain    Severe headache     Visual disturbances    Severe leg pain or numbness or tingling of extremities    Lastly-    Use of a backup method is recommended for the first cycle    Condoms are recommended to protect against STI's    OCP's are 99% effective if take correctly.    The pill helps to keep your periods regular, lighter and shorter and reduces cramps.  If you desire a pregnancy, you may stop taking your OCPs.     Please call the clinic with questions and concerns  [facility]  [phone]            Follow-ups after your visit        Follow-up notes from your care team     Return in about 3 months (around 8/30/2018), or if symptoms worsen or fail to improve, for Birth control follow up.      Who to contact     If you have questions or need follow up information about today's clinic visit or your schedule please contact [facility] directly at [phone].  Normal or non-critical lab and imaging results will be communicated to you by MyChart, letter or phone within 4 business days after the clinic has received the results. If you do not hear from us within 7 days, please contact the clinic through [employer]  "or phone. If you have a critical or abnormal lab result, we will notify you by phone as soon as possible.  Submit refill requests through [employer] or call your pharmacy and they will forward the refill request to us. Please allow 3 business days for your refill to be completed.          Additional Information About Your Visit        [employer] Information     [employer] lets you send messages to your doctor, view your test results, renew your prescriptions, schedule appointments and more. To sign up, go to [email or web address] . Click on \"Log in\" on the left side of the screen, which will take you to the Welcome page. Then click on \"Sign up Now\" on the right side of the page.     You will be asked to enter the access code listed below, as well as some personal information. Please follow the directions to create your username and password.     Your access code is: [email or web address]  Expires: 2018  3:48 PM     Your access code will  in 90 days. If you need help or a new code, please call your [facility] clinic or [phone].        Care EveryWhere ID     This is your Care EveryWhere ID. This could be used by other organizations to access your [facility] medical records  [id number]        Your Vitals Were     Height Last Period Breastfeeding? BMI (Body Mass Index)          5' 4\" (1.626 m) 2018 (Exact Date) No 31.07 kg/m2         Blood Pressure from Last 3 Encounters:   18 110/66   18 (!) 88/62   18 98/66    Weight from Last 3 Encounters:   18 181 lb (82.1 kg)   18 183 lb 8 oz (83.2 kg)   18 185 lb 3.2 oz (84 kg)              Today, you had the following     No orders found for display         Today's Medication Changes          These changes are accurate as of 18  4:45 PM.  If you have any questions, ask your nurse or doctor.               Start taking these medicines.        Dose/Directions    ibuprofen 600 MG tablet   Commonly known as:  ADVIL/MOTRIN   Used for:  " Tension headache   Started by:  Carissa Benitez APRN CNM        Dose:  600 mg   Take 1 tablet (600 mg) by mouth every 6 hours as needed for moderate pain   Quantity:  100 tablet   Refills:  1            Where to get your medicines      These medications were sent to [facility] [address]  [address], [facility] [address]     Phone:  [phone]     ibuprofen 600 MG tablet    norethindrone-ethinyl estradiol 1-20 MG-MCG per tablet                Primary Care Provider Fax #    Physician No Ref-Primary [phone]       No address on file        Equal Access to Services     [facility] : Hadii prema Chauhan, waaxda cholo, qaybta kaalmada kari, trisha phan . So [facility] [phone].    ATENCIÓN: Si habla español, tiene a veloz disposición servicios gratuitos de asistencia lingüística. [facility] [phone].    We comply with applicable federal civil rights laws and [place] laws. We do not discriminate on the basis of race, color, national origin, age, disability, sex, sexual orientation, or gender identity.            Thank you!     Thank you for choosing [facility]  for your care. Our goal is always to provide you with excellent care. Hearing back from our patients is one way we can continue to improve our services. Please take a few minutes to complete the written survey that you may receive in the mail after your visit with us. Thank you!             Your Updated Medication List - Protect others around you: Learn how to safely use, store and throw away your medicines at www.disposemymeds.org.          This list is accurate as of 5/30/18  4:45 PM.  Always use your most recent med list.                   Brand Name Dispense Instructions for use Diagnosis    ibuprofen 600 MG tablet    ADVIL/MOTRIN    100 tablet    Take 1 tablet (600 mg) by mouth every 6 hours as needed for moderate pain     Tension headache       norethindrone-ethinyl estradiol 1-20 MG-MCG per tablet    JUNEL FE 1/20    28 tablet    Take 1 tablet by mouth daily    Absence of menstruation

## 2018-07-01 ENCOUNTER — HOSPITAL ENCOUNTER (EMERGENCY)
Facility: CLINIC | Age: 33
Discharge: HOME | End: 2018-07-01
Attending: EMERGENCY MEDICINE | Admitting: EMERGENCY MEDICINE
Payer: COMMERCIAL

## 2018-07-01 ENCOUNTER — APPOINTMENT (OUTPATIENT)
Dept: GENERAL RADIOLOGY | Facility: CLINIC | Age: 33
End: 2018-07-01
Attending: EMERGENCY MEDICINE
Payer: COMMERCIAL

## 2018-07-01 VITALS
OXYGEN SATURATION: 99 % | DIASTOLIC BLOOD PRESSURE: 63 MMHG | HEART RATE: 61 BPM | TEMPERATURE: 99.2 F | RESPIRATION RATE: 16 BRPM | SYSTOLIC BLOOD PRESSURE: 101 MMHG

## 2018-07-01 DIAGNOSIS — R10.13 ABDOMINAL PAIN, EPIGASTRIC: ICD-10-CM

## 2018-07-01 DIAGNOSIS — R07.9 CHEST PAIN, UNSPECIFIED TYPE: ICD-10-CM

## 2018-07-01 LAB
ALBUMIN SERPL-MCNC: 3.7 G/DL (ref 3.4–5)
ALP SERPL-CCNC: 70 U/L (ref 40–150)
ALT SERPL W P-5'-P-CCNC: 22 U/L (ref 0–50)
ANION GAP SERPL CALCULATED.3IONS-SCNC: 6 MMOL/L (ref 3–14)
AST SERPL W P-5'-P-CCNC: 16 U/L (ref 0–45)
BASOPHILS # BLD AUTO: 0.1 10E9/L (ref 0–0.2)
BASOPHILS NFR BLD AUTO: 0.6 %
BILIRUB SERPL-MCNC: 0.5 MG/DL (ref 0.2–1.3)
BUN SERPL-MCNC: 9 MG/DL (ref 7–30)
CALCIUM SERPL-MCNC: 9.1 MG/DL (ref 8.5–10.1)
CHLORIDE SERPL-SCNC: 106 MMOL/L (ref 94–109)
CO2 SERPL-SCNC: 28 MMOL/L (ref 20–32)
CREAT SERPL-MCNC: 0.65 MG/DL (ref 0.52–1.04)
DIFFERENTIAL METHOD BLD: NORMAL
EOSINOPHIL # BLD AUTO: 0 10E9/L (ref 0–0.7)
EOSINOPHIL NFR BLD AUTO: 0.4 %
ERYTHROCYTE [DISTWIDTH] IN BLOOD BY AUTOMATED COUNT: 13.2 % (ref 10–15)
GFR SERPL CREATININE-BSD FRML MDRD: >90 ML/MIN/1.7M2
GLUCOSE SERPL-MCNC: 89 MG/DL (ref 70–99)
HCT VFR BLD AUTO: 46.2 % (ref 35–47)
HGB BLD-MCNC: 15.1 G/DL (ref 11.7–15.7)
IMM GRANULOCYTES # BLD: 0 10E9/L (ref 0–0.4)
IMM GRANULOCYTES NFR BLD: 0.2 %
LIPASE SERPL-CCNC: 137 U/L (ref 73–393)
LYMPHOCYTES # BLD AUTO: 2.9 10E9/L (ref 0.8–5.3)
LYMPHOCYTES NFR BLD AUTO: 30.7 %
MAGNESIUM SERPL-MCNC: 2.1 MG/DL (ref 1.6–2.3)
MCH RBC QN AUTO: 30.1 PG (ref 26.5–33)
MCHC RBC AUTO-ENTMCNC: 32.7 G/DL (ref 31.5–36.5)
MCV RBC AUTO: 92 FL (ref 78–100)
MONOCYTES # BLD AUTO: 0.5 10E9/L (ref 0–1.3)
MONOCYTES NFR BLD AUTO: 5.7 %
NEUTROPHILS # BLD AUTO: 5.8 10E9/L (ref 1.6–8.3)
NEUTROPHILS NFR BLD AUTO: 62.4 %
NRBC # BLD AUTO: 0 10*3/UL
NRBC BLD AUTO-RTO: 0 /100
PLATELET # BLD AUTO: 348 10E9/L (ref 150–450)
POTASSIUM SERPL-SCNC: 3.9 MMOL/L (ref 3.4–5.3)
PROT SERPL-MCNC: 8.6 G/DL (ref 6.8–8.8)
RBC # BLD AUTO: 5.02 10E12/L (ref 3.8–5.2)
SODIUM SERPL-SCNC: 140 MMOL/L (ref 133–144)
TROPONIN I SERPL-MCNC: <0.015 UG/L (ref 0–0.04)
WBC # BLD AUTO: 9.3 10E9/L (ref 4–11)

## 2018-07-01 PROCEDURE — 71046 X-RAY EXAM CHEST 2 VIEWS: CPT

## 2018-07-01 PROCEDURE — 99285 EMERGENCY DEPT VISIT HI MDM: CPT | Mod: 25

## 2018-07-01 PROCEDURE — 84484 ASSAY OF TROPONIN QUANT: CPT | Performed by: EMERGENCY MEDICINE

## 2018-07-01 PROCEDURE — 85025 COMPLETE CBC W/AUTO DIFF WBC: CPT | Performed by: EMERGENCY MEDICINE

## 2018-07-01 PROCEDURE — 83690 ASSAY OF LIPASE: CPT | Performed by: EMERGENCY MEDICINE

## 2018-07-01 PROCEDURE — 25000125 ZZHC RX 250: Performed by: EMERGENCY MEDICINE

## 2018-07-01 PROCEDURE — 93005 ELECTROCARDIOGRAM TRACING: CPT

## 2018-07-01 PROCEDURE — 80053 COMPREHEN METABOLIC PANEL: CPT | Performed by: EMERGENCY MEDICINE

## 2018-07-01 PROCEDURE — 83735 ASSAY OF MAGNESIUM: CPT | Performed by: EMERGENCY MEDICINE

## 2018-07-01 PROCEDURE — 25000132 ZZH RX MED GY IP 250 OP 250 PS 637: Performed by: EMERGENCY MEDICINE

## 2018-07-01 RX ORDER — NITROGLYCERIN 0.4 MG/1
0.4 TABLET SUBLINGUAL EVERY 5 MIN PRN
Status: DISCONTINUED | OUTPATIENT
Start: 2018-07-01 | End: 2018-07-01

## 2018-07-01 RX ORDER — ASPIRIN 81 MG/1
324 TABLET, CHEWABLE ORAL ONCE
Status: DISCONTINUED | OUTPATIENT
Start: 2018-07-01 | End: 2018-07-01

## 2018-07-01 RX ADMIN — FAMOTIDINE 20 MG: 10 INJECTION, SOLUTION INTRAVENOUS at 18:24

## 2018-07-01 RX ADMIN — LIDOCAINE HYDROCHLORIDE 30 ML: 20 SOLUTION ORAL; TOPICAL at 18:22

## 2018-07-01 ASSESSMENT — ENCOUNTER SYMPTOMS
VOMITING: 1
FATIGUE: 1
PALPITATIONS: 1
DIZZINESS: 1

## 2018-07-01 NOTE — ED PROVIDER NOTES
History     Chief Complaint:  Chest Pain     The history is provided by the patient.      Ethel Chi is a 33 year old female who presents with chest pain. According to the patient, she started having chest pain about three or four weeks ago that is aggravated by spicy food. However, today the patient started to have heart palpitations this afternoon that did not seem to subside. The patient denies having diabetes and heart burn. However, she feels fatigued, dizzy, and has experienced vomiting. The patient has had her gallbladder removed, but no other surgeries to the abdomen or chest. She also noted that she has a burning sensation in both of her feet as well as a history of anemia.      Cardiac/PE/DVT Risk Factors:  History of hypertension - No  History of hyperlipidemia - No  History of diabetes - No  History of smoking - No  Personal history of PE/DVT - No  Family history of PE/DVT - No  Family history of heart complications - No  Recent travel - No  Recent surgery - No  Other immobilizations - No  Cancer - No    Allergies:  No Known Drug Allergies    Medications:    Junel FE 1/20    Past Medical History:    Depression, major    Past Surgical History:    Laparoscopic cholecystectomy     Family History:    The patient denies any relevant family medical history.    Social History:  The patient was accompanied to the ED by friend.  Smoking Status: Yes  Smokeless Tobacco: No  Alcohol Use: No  Marital Status: Single     Review of Systems   Constitutional: Positive for fatigue.   Cardiovascular: Positive for chest pain and palpitations.   Gastrointestinal: Positive for vomiting.   Neurological: Positive for dizziness.   All other systems reviewed and are negative.    Physical Exam   Vitals:  Patient Vitals for the past 24 hrs:   BP Temp Temp src Pulse Heart Rate Resp SpO2   07/01/18 1921 101/63 - - 61 - 16 99 %   07/01/18 1900 96/62 - - - - - 100 %   07/01/18 1846 - - - - - - 99 %   07/01/18 1844 114/74 - - - - -  -   07/01/18 1754 108/76 99.2  F (37.3  C) Temporal - 58 16 100 %     Physical Exam  Constitutional: Patient appears well-developed and well-nourished. There is mild distress.   Head: No external signs of trauma noted.  Eyes: Pupils are equal, round, and reactive to light.   Neck: No JVD noted  Cardiovascular: Normal rate, regular rhythm and normal heart sounds.  Exam reveals no gallop and no friction rub.  No murmur heard. Equal B/L peripheral pulses.  Pulmonary/Chest: Effort normal and breath sounds normal. No respiratory distress. Patient has no wheezes. Patient has no rales.   Abdominal: Soft. There is epigastric tenderness.   Extremities: No edema noted  Neurological: Patient is alert and oriented to person, place, and time.   Skin: Skin is warm and dry. There is no diaphoresis noted.       Emergency Department Course     ECG:  ECG taken at 1831, ECG read at 1835  Sinus bradycardia   Otherwise normal ECG   No significant change compared to EKG dated 04/07/2013  Rate 55 bpm. CT interval 138. QRS duration 84. QT/QTc 434/415. P-R-T axes 32 39 40.    Imaging:  Radiology findings were communicated with the patient who voiced understanding of the findings.  XR Chest 2 Views:   No active infiltrate.      Per Radiologist.     Laboratory:  Laboratory findings were communicated with the patient who voiced understanding of the findings.  CBC: AWNL. (WBC 9.3, HGB 15.1, )  1817 Troponin I: <0.015  CMP: AWNL (Creatinine 0.65)  Lipase: 137   Magnesium: 2.1     Interventions:  1822 GI Cocktail (Maalox/Mylanta and viscous Lidocaine), 30 mL suspension, Oral  1824 Pepcid, 20 mg, IV     Emergency Department Course:  Nursing notes and vitals reviewed.  1757 I had my initial encounter with the patient.  I performed an exam of the patient as documented above.   IV was inserted and blood was drawn for laboratory testing, results above.  The patient was sent for a XR while in the emergency department, results above.   EKG  obtained in the ED, see results above.   1910 I rechecked the patient.    1910 I discussed the treatment plan with the patient. They expressed understanding of this plan and consented to discharge. They will be discharged home with instructions for care and follow up. In addition, the patient will return to the emergency department with any new or worsening symptoms.  All questions were answered.    Impression & Plan      Medical Decision Making:  This 33-year-old female patient presents the ED due to chest and epigastric pain.  Please see the HPI and exam for specifics.  The patient points to her lower chest her physical exam was most notable for mild epigastric discomfort.  The patient's EKG is normal and does not show any change from her previous and her troponin is normal as well.  Given the duration of her symptoms this should effectively rule her out for ACS.  Her heart score puts her in the low risk pool and again, I believe this is likely more of a gastritis or heartburn issue and ACS.  I will prescribe the patient Zantac and have her follow-up in the outpatient setting.  Anticipatory guidance given prior to discharge.    Diagnosis:    ICD-10-CM    1. Chest pain, unspecified type R07.9    2. Abdominal pain, epigastric R10.13      Disposition:   Discharge    Discharge Medications:  Discharge Medication List as of 7/1/2018  7:15 PM      START taking these medications    Details   ranitidine (ZANTAC) 150 MG tablet Take 0.5 tablets (75 mg) by mouth 2 times daily for 10 days, Disp-10 tablet, R-0, Local Print           Scribe Disclosure:  I, Alexx Reyna, am serving as a scribe at 6:00 PM on 7/1/2018 to document services personally performed by Ramón Reina DO, based on my observations and the provider's statements to me.  7/1/2018   [facility] EMERGENCY DEPARTMENT       Ramón Reina DO  07/01/18 2049

## 2018-07-01 NOTE — ED AVS SNAPSHOT
[facility] Emergency Department    [address]    [facility] [address]    Phone:  [phone]    Fax:  [phone]                                       Ethel Chi   MRN: [id number]    Department:  [facility] Emergency Department   Date of Visit:  7/1/2018           Patient Information     Date Of Birth          1985        Your diagnoses for this visit were:     Chest pain, unspecified type     Abdominal pain, epigastric        You were seen by Ramón Reina DO.      Follow-up Information     Follow up with Carissa Benitez APRN CNM. Call in 2 days.    Specialty:  OB/Gyn    Why:  As needed    Contact information:    [address]  [place] [address]  [phone]          Follow up with [facility] Emergency Department.    Specialty:  EMERGENCY MEDICINE    Why:  If symptoms worsen    Contact information:    [address]  [facility] [address] [phone]        Discharge Instructions         *Abdominal Pain, Unknown Cause (Female)    The exact cause of your abdominal (stomach) pain is not certain. This does not mean that this is something to worry about, or the right tests were not done. Everyone likes to know the exact cause of the problem, but sometimes with abdominal pain, there is no clear-cut cause, and this could be a good thing. The good news is that your symptoms can be treated, and you will feel better.   Your condition does not seem serious now; however, sometimes the signs of a serious problem may take more time to appear. For this reason, it is important for you to watch for any new symptoms, problems, or worsening of your condition.  Over the next few days, the abdominal pain may come and go, or be continuous. Other common symptoms can include nausea and vomiting. Sometimes it can be difficult to tell if you feel nauseous, you may just feel bad and not associate that feeling with nausea.  Constipation, diarrhea, and a fever may go along with the pain.  The pain may continue even if treated correctly over the following days. Depending on how things go, sometimes the cause can become clear and may require further or different treatment. Additional evaluations, medications, or tests may be needed.  Home care  Your health care provider may prescribe medications for pain, symptoms, or an infection.  Follow the health care provider's instructions for taking these medications.  General care    Rest until your next exam. No strenuous activities.    Try to find positions that ease discomfort. A small pillow placed on the abdomen may help relieve pain.    Something warm on your abdomen (such as a heating pad) may help, but be careful not to burn yourself.  Diet    Do not force yourself to eat, especially if having cramps, vomiting, or diarrhea.    Water is important so you do not get dehydrated. Soup may also be good. Sports drinks may also help, especially if they are not too acidic. Make sure you don't drink sugary drinks as this can make things worse. Take liquids in small amounts. Do not guzzle them.    Caffeine sometimes makes the pain and cramping worse.    Avoid dairy products if you have vomiting or diarrhea.    Don't eat large amounts at a time. Wait a few minutes between bites.    Eat a diet low in fiber (called a low-residue diet). Foods allowed include refined breads, white rice, fruit and vegetable juices without pulp, tender meats. These foods will pass more easily through the intestine.    Avoid fried or fatty foods, dairy, alcohol and spicy foods until your symptoms go away.  Follow-up care  Follow up with your health care provider as instructed, or if your pain does not begin to improve in the next 24 hours.  When to seek medical care  Seek prompt medical care if any of the following occur:    Pain gets worse or moves to the right lower abdomen    New or worsening vomiting or  diarrhea    Swelling of the abdomen    Unable to pass stool for more than three days    New fever over 101  F (38.3 C), or rising fever    Blood in vomit or bowel movements (dark red or black color)    Jaundice (yellow color of eyes and skin)    Weakness, dizziness    Chest, arm, back, neck or jaw pain    Unexpected vaginal bleeding or missed period  Call 911  Call emergency services if any of the following occur:    Trouble breathing    Confusion    Fainting or loss of consciousness    Rapid heart rate    Seizure    9436-3504 [facility], [address] [place] [address], [place] [address]. All rights reserved. This information is not intended as a substitute for professional medical care. Always follow your healthcare professional's instructions.          24 Hour Appointment Hotline       To make an appointment at any [facility], call [phone] ([phone]). If you don't have a family doctor or clinic, we will help you find one. [place] clinics are conveniently located to serve the needs of you and your family.             Review of your medicines      START taking        Dose / Directions Last dose taken    ranitidine 150 MG tablet   Commonly known as:  ZANTAC   Dose:  75 mg   Quantity:  10 tablet        Take 0.5 tablets (75 mg) by mouth 2 times daily for 10 days   Refills:  0          Our records show that you are taking the medicines listed below. If these are incorrect, please call your family doctor or clinic.        Dose / Directions Last dose taken    ibuprofen 600 MG tablet   Commonly known as:  ADVIL/MOTRIN   Dose:  600 mg   Quantity:  100 tablet        Take 1 tablet (600 mg) by mouth every 6 hours as needed for moderate pain   Refills:  1        norethindrone-ethinyl estradiol 1-20 MG-MCG per tablet   Commonly known as:  JUNEL FE 1/20   Dose:  1 tablet   Quantity:  28 tablet        Take 1 tablet by mouth daily   Refills:  2                Prescriptions were sent or printed at these locations (1  Prescription)                   Other Prescriptions                Printed at Department/Unit printer (1 of 1)         ranitidine (ZANTAC) 150 MG tablet                Procedures and tests performed during your visit     CBC with platelets differential    Cardiac Continuous Monitoring    Comprehensive metabolic panel    EKG 12-lead, tracing only    Lipase    Magnesium    Peripheral IV: Standard    Pulse oximetry nursing    Review medications with patient    Troponin I    XR Chest 2 Views      Orders Needing Specimen Collection     None      Pending Results     No orders found from 6/29/2018 to 7/2/2018.            Pending Culture Results     No orders found from 6/29/2018 to 7/2/2018.            Pending Results Instructions     If you had any lab results that were not finalized at the time of your Discharge, you can call the ED Lab Result RN at [phone]. You will be contacted by this team for any positive Lab results or changes in treatment. The nurses are available 7 days a week from 10A to 6:30P.  You can leave a message 24 hours per day and they will return your call.        Test Results From Your Hospital Stay        7/1/2018  6:25 PM      Component Results     Component Value Ref Range & Units Status    WBC 9.3 4.0 - 11.0 10e9/L Final    RBC Count 5.02 3.8 - 5.2 10e12/L Final    Hemoglobin 15.1 11.7 - 15.7 g/dL Final    Hematocrit 46.2 35.0 - 47.0 % Final    MCV 92 78 - 100 fl Final    MCH 30.1 26.5 - 33.0 pg Final    MCHC 32.7 31.5 - 36.5 g/dL Final    RDW 13.2 10.0 - 15.0 % Final    Platelet Count 348 150 - 450 10e9/L Final    Diff Method Automated Method  Final    % Neutrophils 62.4 % Final    % Lymphocytes 30.7 % Final    % Monocytes 5.7 % Final    % Eosinophils 0.4 % Final    % Basophils 0.6 % Final    % Immature Granulocytes 0.2 % Final    Nucleated RBCs 0 0 /100 Final    Absolute Neutrophil 5.8 1.6 - 8.3 10e9/L Final    Absolute Lymphocytes 2.9 0.8 - 5.3 10e9/L Final    Absolute Monocytes 0.5 0.0 -  1.3 10e9/L Final    Absolute Eosinophils 0.0 0.0 - 0.7 10e9/L Final    Absolute Basophils 0.1 0.0 - 0.2 10e9/L Final    Abs Immature Granulocytes 0.0 0 - 0.4 10e9/L Final    Absolute Nucleated RBC 0.0  Final         7/1/2018  6:47 PM      Component Results     Component Value Ref Range & Units Status    Troponin I ES <0.015 0.000 - 0.045 ug/L Final    The 99th percentile for upper reference range is 0.045 ug/L.  Troponin values   in the range of 0.045 - 0.120 ug/L may be associated with risks of adverse   clinical events.           7/1/2018  6:47 PM      Component Results     Component Value Ref Range & Units Status    Sodium 140 133 - 144 mmol/L Final    Potassium 3.9 3.4 - 5.3 mmol/L Final    Chloride 106 94 - 109 mmol/L Final    Carbon Dioxide 28 20 - 32 mmol/L Final    Anion Gap 6 3 - 14 mmol/L Final    Glucose 89 70 - 99 mg/dL Final    Urea Nitrogen 9 7 - 30 mg/dL Final    Creatinine 0.65 0.52 - 1.04 mg/dL Final    GFR Estimate >90 >60 mL/min/1.7m2 Final    Non  GFR Calc    GFR Estimate If Black >90 >60 mL/min/1.7m2 Final    African American GFR Calc    Calcium 9.1 8.5 - 10.1 mg/dL Final    Bilirubin Total 0.5 0.2 - 1.3 mg/dL Final    Albumin 3.7 3.4 - 5.0 g/dL Final    Protein Total 8.6 6.8 - 8.8 g/dL Final    Alkaline Phosphatase 70 40 - 150 U/L Final    ALT 22 0 - 50 U/L Final    AST 16 0 - 45 U/L Final         7/1/2018  6:47 PM      Component Results     Component Value Ref Range & Units Status    Lipase 137 73 - 393 U/L Final         7/1/2018  6:47 PM      Component Results     Component Value Ref Range & Units Status    Magnesium 2.1 1.6 - 2.3 mg/dL Final         7/1/2018  7:02 PM      Narrative     XR CHEST 2 VW   7/1/2018 6:41 PM     HISTORY: Chest pain;     COMPARISON: None.    FINDINGS: The heart is negative.  The lungs are clear. The pulmonary  vasculature is normal.  The bones and soft tissues are unremarkable.        Impression     IMPRESSION: No active infiltrate.        AYAD ROSALES  MD                Clinical Quality Measure: Blood Pressure Screening     Your blood pressure was checked while you were in the emergency department today. The last reading we obtained was  BP: 114/74 . Please read the guidelines below about what these numbers mean and what you should do about them.  If your systolic blood pressure (the top number) is less than 120 and your diastolic blood pressure (the bottom number) is less than 80, then your blood pressure is normal. There is nothing more that you need to do about it.  If your systolic blood pressure (the top number) is 120-139 or your diastolic blood pressure (the bottom number) is 80-89, your blood pressure may be higher than it should be. You should have your blood pressure rechecked within a year by a primary care provider.  If your systolic blood pressure (the top number) is 140 or greater or your diastolic blood pressure (the bottom number) is 90 or greater, you may have high blood pressure. High blood pressure is treatable, but if left untreated over time it can put you at risk for heart attack, stroke, or kidney failure. You should have your blood pressure rechecked by a primary care provider within the next 4 weeks.  If your provider in the emergency department today gave you specific instructions to follow-up with your doctor or provider even sooner than that, you should follow that instruction and not wait for up to 4 weeks for your follow-up visit.        Thank you for choosing [facility]       Thank you for choosing [facility] for your care. Our goal is always to provide you with excellent care. Hearing back from our patients is one way we can continue to improve our services. Please take a few minutes to complete the written survey that you may receive in the mail after you visit with us. Thank you!        [facility] Information     [facility] lets you send messages to your doctor, view your test results, renew your prescriptions, schedule appointments and  "more. To sign up, go to [email or web address] . Click on \"Log in\" on the left side of the screen, which will take you to the Welcome page. Then click on \"Sign up Now\" on the right side of the page.     You will be asked to enter the access code listed below, as well as some personal information. Please follow the directions to create your username and password.     Your access code is: [email or web address]  Expires: 2018  3:48 PM     Your access code will  in 90 days. If you need help or a new code, please call your [facility] clinic or [phone].        Care EveryWhere ID     This is your Care EveryWhere ID. This could be used by other organizations to access your [facility] medical records  [id number]        Equal Access to Services     SHOLA WEST : Sergei Chauhan, faizan emmanuel, sarai pierce, trisha guerra. So [facility] [phone].    ATENCIÓN: Si habla español, tiene a veloz disposición servicios gratuitos de asistencia lingüística. Llame al [phone].    We comply with applicable federal civil rights laws and [place] laws. We do not discriminate on the basis of race, color, national origin, age, disability, sex, sexual orientation, or gender identity.            After Visit Summary       This is your record. Keep this with you and show to your community pharmacist(s) and doctor(s) at your next visit.                  "

## 2018-07-01 NOTE — ED AVS SNAPSHOT
[facility] Emergency Department    [address]    [facility] [address]    Phone:  [phone]    Fax:  [phone]                                       Ethel Chi   MRN: [id number]    Department:  [facility] Emergency Department   Date of Visit:  7/1/2018           After Visit Summary Signature Page     I have received my discharge instructions, and my questions have been answered. I have discussed any challenges I see with this plan with the nurse or doctor.    ..........................................................................................................................................  Patient/Patient Representative Signature      ..........................................................................................................................................  Patient Representative Print Name and Relationship to Patient    ..................................................               ................................................  Date                                            Time    ..........................................................................................................................................  Reviewed by Signature/Title    ...................................................              ..............................................  Date                                                            Time

## 2018-07-01 NOTE — ED TRIAGE NOTES
Epigastric pain for four weeks.  Vomited once last night and today pain is worse.  Also notes occasional dizziness.

## 2018-07-02 LAB — INTERPRETATION ECG - MUSE: NORMAL

## 2018-07-02 NOTE — DISCHARGE INSTRUCTIONS
*Abdominal Pain, Unknown Cause (Female)    The exact cause of your abdominal (stomach) pain is not certain. This does not mean that this is something to worry about, or the right tests were not done. Everyone likes to know the exact cause of the problem, but sometimes with abdominal pain, there is no clear-cut cause, and this could be a good thing. The good news is that your symptoms can be treated, and you will feel better.   Your condition does not seem serious now; however, sometimes the signs of a serious problem may take more time to appear. For this reason, it is important for you to watch for any new symptoms, problems, or worsening of your condition.  Over the next few days, the abdominal pain may come and go, or be continuous. Other common symptoms can include nausea and vomiting. Sometimes it can be difficult to tell if you feel nauseous, you may just feel bad and not associate that feeling with nausea. Constipation, diarrhea, and a fever may go along with the pain.  The pain may continue even if treated correctly over the following days. Depending on how things go, sometimes the cause can become clear and may require further or different treatment. Additional evaluations, medications, or tests may be needed.  Home care  Your health care provider may prescribe medications for pain, symptoms, or an infection.  Follow the health care provider's instructions for taking these medications.  General care    Rest until your next exam. No strenuous activities.    Try to find positions that ease discomfort. A small pillow placed on the abdomen may help relieve pain.    Something warm on your abdomen (such as a heating pad) may help, but be careful not to burn yourself.  Diet    Do not force yourself to eat, especially if having cramps, vomiting, or diarrhea.    Water is important so you do not get dehydrated. Soup may also be good. Sports drinks may also help, especially if they are not too acidic. Make sure you  don't drink sugary drinks as this can make things worse. Take liquids in small amounts. Do not guzzle them.    Caffeine sometimes makes the pain and cramping worse.    Avoid dairy products if you have vomiting or diarrhea.    Don't eat large amounts at a time. Wait a few minutes between bites.    Eat a diet low in fiber (called a low-residue diet). Foods allowed include refined breads, white rice, fruit and vegetable juices without pulp, tender meats. These foods will pass more easily through the intestine.    Avoid fried or fatty foods, dairy, alcohol and spicy foods until your symptoms go away.  Follow-up care  Follow up with your health care provider as instructed, or if your pain does not begin to improve in the next 24 hours.  When to seek medical care  Seek prompt medical care if any of the following occur:    Pain gets worse or moves to the right lower abdomen    New or worsening vomiting or diarrhea    Swelling of the abdomen    Unable to pass stool for more than three days    New fever over 101  F (38.3 C), or rising fever    Blood in vomit or bowel movements (dark red or black color)    Jaundice (yellow color of eyes and skin)    Weakness, dizziness    Chest, arm, back, neck or jaw pain    Unexpected vaginal bleeding or missed period  Call 911  Call emergency services if any of the following occur:    Trouble breathing    Confusion    Fainting or loss of consciousness    Rapid heart rate    Seizure    [id number] [facility], [address] [place] [address], [place] [address]. All rights reserved. This information is not intended as a substitute for professional medical care. Always follow your healthcare professional's instructions.

## 2018-07-31 ENCOUNTER — TELEPHONE (OUTPATIENT)
Dept: BEHAVIORAL HEALTH | Facility: CLINIC | Age: 33
End: 2018-07-31

## 2018-07-31 NOTE — TELEPHONE ENCOUNTER
Pt had questions about when is she due foe physical. CHW conformed that Pt is due for physical on 12/2018

## 2018-08-16 ENCOUNTER — OFFICE VISIT (OUTPATIENT)
Dept: INTERNAL MEDICINE | Facility: CLINIC | Age: 33
End: 2018-08-16
Payer: COMMERCIAL

## 2018-08-16 VITALS
HEART RATE: 58 BPM | RESPIRATION RATE: 16 BRPM | TEMPERATURE: 98.3 F | BODY MASS INDEX: 31.57 KG/M2 | DIASTOLIC BLOOD PRESSURE: 58 MMHG | OXYGEN SATURATION: 98 % | SYSTOLIC BLOOD PRESSURE: 94 MMHG | WEIGHT: 183.9 LBS

## 2018-08-16 DIAGNOSIS — Z30.40 ENCOUNTER FOR REFILL OF PRESCRIPTION FOR CONTRACEPTION: ICD-10-CM

## 2018-08-16 DIAGNOSIS — M62.838 TRAPEZIUS MUSCLE SPASM: Primary | ICD-10-CM

## 2018-08-16 DIAGNOSIS — R42 DIZZINESS: ICD-10-CM

## 2018-08-16 LAB
ALBUMIN SERPL-MCNC: 3.6 G/DL (ref 3.4–5)
ALP SERPL-CCNC: 70 U/L (ref 40–150)
ALT SERPL W P-5'-P-CCNC: 19 U/L (ref 0–50)
ANION GAP SERPL CALCULATED.3IONS-SCNC: 6 MMOL/L (ref 3–14)
AST SERPL W P-5'-P-CCNC: 12 U/L (ref 0–45)
BILIRUB SERPL-MCNC: 0.2 MG/DL (ref 0.2–1.3)
BUN SERPL-MCNC: 6 MG/DL (ref 7–30)
CALCIUM SERPL-MCNC: 8.8 MG/DL (ref 8.5–10.1)
CHLORIDE SERPL-SCNC: 110 MMOL/L (ref 94–109)
CO2 SERPL-SCNC: 29 MMOL/L (ref 20–32)
CREAT SERPL-MCNC: 0.62 MG/DL (ref 0.52–1.04)
ERYTHROCYTE [DISTWIDTH] IN BLOOD BY AUTOMATED COUNT: 14 % (ref 10–15)
GFR SERPL CREATININE-BSD FRML MDRD: >90 ML/MIN/1.7M2
GLUCOSE SERPL-MCNC: 85 MG/DL (ref 70–99)
HCT VFR BLD AUTO: 47.9 % (ref 35–47)
HGB BLD-MCNC: 15.4 G/DL (ref 11.7–15.7)
MCH RBC QN AUTO: 30.2 PG (ref 26.5–33)
MCHC RBC AUTO-ENTMCNC: 32.2 G/DL (ref 31.5–36.5)
MCV RBC AUTO: 94 FL (ref 78–100)
PLATELET # BLD AUTO: 309 10E9/L (ref 150–450)
POTASSIUM SERPL-SCNC: 3.8 MMOL/L (ref 3.4–5.3)
PROT SERPL-MCNC: 8.2 G/DL (ref 6.8–8.8)
RBC # BLD AUTO: 5.1 10E12/L (ref 3.8–5.2)
SODIUM SERPL-SCNC: 145 MMOL/L (ref 133–144)
TSH SERPL DL<=0.005 MIU/L-ACNC: 1.16 MU/L (ref 0.4–4)
WBC # BLD AUTO: 9.3 10E9/L (ref 4–11)

## 2018-08-16 PROCEDURE — 84443 ASSAY THYROID STIM HORMONE: CPT | Performed by: INTERNAL MEDICINE

## 2018-08-16 PROCEDURE — 99213 OFFICE O/P EST LOW 20 MIN: CPT | Performed by: INTERNAL MEDICINE

## 2018-08-16 PROCEDURE — 36415 COLL VENOUS BLD VENIPUNCTURE: CPT | Performed by: INTERNAL MEDICINE

## 2018-08-16 PROCEDURE — 80053 COMPREHEN METABOLIC PANEL: CPT | Performed by: INTERNAL MEDICINE

## 2018-08-16 PROCEDURE — 82306 VITAMIN D 25 HYDROXY: CPT | Performed by: INTERNAL MEDICINE

## 2018-08-16 PROCEDURE — 85027 COMPLETE CBC AUTOMATED: CPT | Performed by: INTERNAL MEDICINE

## 2018-08-16 RX ORDER — NORETHINDRONE ACETATE AND ETHINYL ESTRADIOL 1MG-20(21)
1 KIT ORAL DAILY
Qty: 84 TABLET | Refills: 3 | Status: SHIPPED | OUTPATIENT
Start: 2018-08-16 | End: 2019-08-01

## 2018-08-16 RX ORDER — CYCLOBENZAPRINE HCL 5 MG
5-10 TABLET ORAL 3 TIMES DAILY PRN
Qty: 42 TABLET | Refills: 1 | Status: SHIPPED | OUTPATIENT
Start: 2018-08-16 | End: 2018-08-18

## 2018-08-16 NOTE — MR AVS SNAPSHOT
After Visit Summary   8/16/2018    Ethel Chi    MRN: [id number]           Patient Information     Date Of Birth          1985        Visit Information        Provider Department      8/16/2018 11:30 AM Estela Hernandez MD; [employer] [facility]        Today's Diagnoses     Encounter for refill of prescription for contraception    -  1    Dizziness        Trapezius muscle spasm          Care Instructions    Birth control refill.    ---    For trapezius muscle spasm, recommend:    - warm compresses   - ibuprofen as needed (day or night)  - flexeril as needed at night   - referred to physical therapy for further evaluation and treatment    - number below, but they will contact you to schedule as well    ---    For dizziness, let's get blood tests to further evaluate.           Follow-ups after your visit        Additional Services     [facility] PT, HAND, AND CHIROPRACTIC REFERRAL       **This order will print in the [facility] Scheduling Office**    Physical Therapy, Hand Therapy and Chiropractic Care are available through:    *[facility]  *[facility]  *[facility]    Call one number to schedule at any of the above locations: [phone].    Your provider has referred you to: Physical Therapy at [facility] or [facility]    Indication/Reason for Referral: bilateral trapezius spasm  Onset of Illness: chronic  Therapy Orders: Evaluate and Treat  Special Programs: None  Special Request: None    Mandi Raymond      Additional Comments for the Therapist or Chiropractor: none    Please be aware that coverage of these services is subject to the terms and limitations of your health insurance plan.  Call member services at your health plan with any benefit or coverage questions.      Please bring the following to your appointment:    *Your personal calendar for scheduling future appointments  *Comfortable clothing                   Who to contact     If you have questions or need follow up information about today's clinic visit or your schedule please contact [facility] directly at [phone].  Normal or non-critical lab and imaging results will be communicated to you by MyChart, letter or phone within 4 business days after the clinic has received the results. If you do not hear from us within 7 days, please contact the clinic through MyChart or phone. If you have a critical or abnormal lab result, we will notify you by phone as soon as possible.  Submit refill requests through scrible or call your pharmacy and they will forward the refill request to us. Please allow 3 business days for your refill to be completed.          Additional Information About Your Visit        Care EveryWhere ID     This is your Care EveryWhere ID. This could be used by other organizations to access your [facility] medical records  [id number]        Your Vitals Were     Pulse Temperature Respirations Pulse Oximetry BMI (Body Mass Index)       58 98.3  F (36.8  C) (Oral) 16 98% 31.57 kg/m2        Blood Pressure from Last 3 Encounters:   08/16/18 94/58   07/01/18 101/63   05/30/18 110/66    Weight from Last 3 Encounters:   08/16/18 183 lb 14.4 oz (83.4 kg)   05/30/18 181 lb (82.1 kg)   05/01/18 183 lb 8 oz (83.2 kg)              We Performed the Following     CBC with platelets     Comprehensive metabolic panel     [facility] PT, HAND, AND CHIROPRACTIC REFERRAL     TSH with free T4 reflex     Vitamin D Deficiency          Today's Medication Changes          These changes are accurate as of 8/16/18 11:57 AM.  If you have any questions, ask your nurse or doctor.               Start taking these medicines.        Dose/Directions    cyclobenzaprine 5 MG tablet   Commonly known as:  FLEXERIL   Used for:  Trapezius muscle spasm   Started by:  Estela Hernandez MD        Dose:  5-10 mg   Take 1-2 tablets (5-10 mg) by mouth 3 times daily as needed for  muscle spasms   Quantity:  42 tablet   Refills:  1            Where to get your medicines      These medications were sent to [facility] [address]  [address] [address] [facility], [facility] [address]     Phone:  [phone]     cyclobenzaprine 5 MG tablet    norethindrone-ethinyl estradiol 1-20 MG-MCG per tablet                Primary Care Provider Office Phone # Fax #    Carissa Benitez, [facility] [phone] [address] [facility] [address]  [facility] [address]        Equal Access to Services     [facility]: Hadii aad ku hadasho Soomaali, waaxda luqadaha, qaybta kaalmada adeegyada, trisha cortez haylucius phan . So [facility] [phone].    ATENCIÓN: Si habla español, tiene a veloz disposición servicios gratuitos de asistencia lingüística. [facility] [phone].    We comply with applicable federal civil rights laws and [place] laws. We do not discriminate on the basis of race, color, national origin, age, disability, sex, sexual orientation, or gender identity.            Thank you!     Thank you for choosing [facility]  for your care. Our goal is always to provide you with excellent care. Hearing back from our patients is one way we can continue to improve our services. Please take a few minutes to complete the written survey that you may receive in the mail after your visit with us. Thank you!             Your Updated Medication List - Protect others around you: Learn how to safely use, store and throw away your medicines at www.disposemymeds.org.          This list is accurate as of 8/16/18 11:57 AM.  Always use your most recent med list.                   Brand Name Dispense Instructions for use Diagnosis    cyclobenzaprine 5 MG tablet    FLEXERIL    42 tablet    Take 1-2 tablets (5-10 mg) by mouth 3 times daily as needed for muscle spasms    Trapezius muscle spasm       ibuprofen 600 MG tablet    ADVIL/MOTRIN    100 tablet    Take  1 tablet (600 mg) by mouth every 6 hours as needed for moderate pain    Tension headache       norethindrone-ethinyl estradiol 1-20 MG-MCG per tablet    JUNEL FE 1/20    84 tablet    Take 1 tablet by mouth daily    Encounter for refill of prescription for contraception

## 2018-08-16 NOTE — PATIENT INSTRUCTIONS
Birth control refill.    ---    For trapezius muscle spasm, recommend:    - warm compresses   - ibuprofen as needed (day or night)  - flexeril as needed at night   - referred to physical therapy for further evaluation and treatment    - number below, but they will contact you to schedule as well    ---    For dizziness, let's get blood tests to further evaluate.

## 2018-08-16 NOTE — LETTER
08/17/18      Ethel Chi  [address] [place] [address]        Dear MsMartínez,    It was a pleasure seeing you again the other day! I hope this finds you well.    I wanted to let you know that your labs came back as generally normal.    Please feel free to contact me with any questions or concerns.      Take care,    Estela Hernandez MD   [address] [place] [address]  T: [phone], F: [phone]

## 2018-08-16 NOTE — PROGRESS NOTES
SUBJECTIVE:                                                      HPI: Ethel Chi is a pleasant 33 year old female who presents with neck and back pain and dizziness:    Patient speaks English but it is her second language. Teenage daughter helps translate.     Re: back and neck pain:  - ongoing for years  - starts back of head and radiates down to bilateral posterior neck and bilateral upper back  - pain is always present, but worse after prolonged physical activity (cleaning home, running errands)  - sometimes worse with turning head  - better with ibuprofen and hot shower    Re: dizziness:  - ongoing for the last 2 weeks  - further describes as lightheadedness  - denies vertigo or gait instability  - occurs off and on during the day  - notices more when she has been on her feet for prolonged period    - no chest pain or palpitations  - no shortness of breath  - no presyncope or syncope    - says she still stays well-hydrated during the day and eats regular meals    Unrelated to above, patient requests a refill of her OCP.    The medication, allergy, and problem lists have been reviewed and updated as appropriate.       OBJECTIVE:                                                      BP 94/58  Pulse 58  Temp 98.3  F (36.8  C) (Oral)  Resp 16  Wt 183 lb 14.4 oz (83.4 kg)  SpO2 98%  BMI 31.57 kg/m2  Constitutional: well-appearing  Respiratory: normal respiratory effort; clear to auscultation bilaterally  Cardiovascular: regular rate and rhythm; no edema  Gastrointestinal: soft, non-tender, non-distended, and bowel sounds present; no organomegaly or masses   Musculoskeletal: normal gait and station; bilateral, superior trapezius spasm and tenderness to palpation      ASSESSMENT/PLAN:                                                      (M62.838) Trapezius muscle spasm  (primary encounter diagnosis)  Comment: bilateral.   Plan:    - warm compresses as needed.   - ibuprofen as needed.   - Flexeril at night as  needed.   - referred to PT for further evaluation and treatment - patient will be contacted to schedule.     (R42) Dizziness  Comment: light-headedness; etiology unclear.   Plan: CBC, CMP, TSH reflex, and vitamin D level today.     (Z30.40) Encounter for refill of prescription for contraception  Plan: OCP refilled.     The instructions on the AVS were discussed and explained to the patient. Patient expressed understanding of instructions.    (Chart documentation was completed, in part, with [employer] voice-recognition software. Even though reviewed, some grammatical, spelling, and word errors may remain.)    Estela Hernandez MD   [address] [address]  T: [phone], F: [phone]

## 2018-08-17 LAB — DEPRECATED CALCIDIOL+CALCIFEROL SERPL-MC: 27 UG/L (ref 20–75)

## 2018-08-18 ENCOUNTER — HOSPITAL ENCOUNTER (EMERGENCY)
Facility: CLINIC | Age: 33
Discharge: HOME | End: 2018-08-18
Attending: EMERGENCY MEDICINE | Admitting: EMERGENCY MEDICINE
Payer: COMMERCIAL

## 2018-08-18 VITALS
HEART RATE: 95 BPM | OXYGEN SATURATION: 95 % | RESPIRATION RATE: 20 BRPM | TEMPERATURE: 98 F | SYSTOLIC BLOOD PRESSURE: 111 MMHG | DIASTOLIC BLOOD PRESSURE: 66 MMHG

## 2018-08-18 DIAGNOSIS — I95.1 ORTHOSTASIS: ICD-10-CM

## 2018-08-18 DIAGNOSIS — R42 LIGHTHEADEDNESS: ICD-10-CM

## 2018-08-18 LAB
ALBUMIN UR-MCNC: NEGATIVE MG/DL
ANION GAP SERPL CALCULATED.3IONS-SCNC: 8 MMOL/L (ref 3–14)
APPEARANCE UR: CLEAR
BASOPHILS # BLD AUTO: 0.1 10E9/L (ref 0–0.2)
BASOPHILS NFR BLD AUTO: 0.5 %
BILIRUB UR QL STRIP: NEGATIVE
BUN SERPL-MCNC: 8 MG/DL (ref 7–30)
CALCIUM SERPL-MCNC: 9.2 MG/DL (ref 8.5–10.1)
CHLORIDE SERPL-SCNC: 106 MMOL/L (ref 94–109)
CO2 SERPL-SCNC: 25 MMOL/L (ref 20–32)
COLOR UR AUTO: ABNORMAL
CREAT SERPL-MCNC: 0.65 MG/DL (ref 0.52–1.04)
D DIMER PPP FEU-MCNC: 0.4 UG/ML FEU (ref 0–0.5)
DIFFERENTIAL METHOD BLD: ABNORMAL
EOSINOPHIL # BLD AUTO: 0 10E9/L (ref 0–0.7)
EOSINOPHIL NFR BLD AUTO: 0.2 %
ERYTHROCYTE [DISTWIDTH] IN BLOOD BY AUTOMATED COUNT: 13.4 % (ref 10–15)
GFR SERPL CREATININE-BSD FRML MDRD: >90 ML/MIN/1.7M2
GLUCOSE SERPL-MCNC: 100 MG/DL (ref 70–99)
GLUCOSE UR STRIP-MCNC: NEGATIVE MG/DL
HCG UR QL: NEGATIVE
HCT VFR BLD AUTO: 48.9 % (ref 35–47)
HGB BLD-MCNC: 16.1 G/DL (ref 11.7–15.7)
HGB UR QL STRIP: NEGATIVE
IMM GRANULOCYTES # BLD: 0 10E9/L (ref 0–0.4)
IMM GRANULOCYTES NFR BLD: 0.2 %
KETONES UR STRIP-MCNC: NEGATIVE MG/DL
LEUKOCYTE ESTERASE UR QL STRIP: ABNORMAL
LYMPHOCYTES # BLD AUTO: 3 10E9/L (ref 0.8–5.3)
LYMPHOCYTES NFR BLD AUTO: 23.7 %
MAGNESIUM SERPL-MCNC: 2.2 MG/DL (ref 1.6–2.3)
MCH RBC QN AUTO: 30.7 PG (ref 26.5–33)
MCHC RBC AUTO-ENTMCNC: 32.9 G/DL (ref 31.5–36.5)
MCV RBC AUTO: 93 FL (ref 78–100)
MONOCYTES # BLD AUTO: 0.7 10E9/L (ref 0–1.3)
MONOCYTES NFR BLD AUTO: 5.4 %
MUCOUS THREADS #/AREA URNS LPF: PRESENT /LPF
NEUTROPHILS # BLD AUTO: 8.9 10E9/L (ref 1.6–8.3)
NEUTROPHILS NFR BLD AUTO: 70 %
NITRATE UR QL: NEGATIVE
NRBC # BLD AUTO: 0 10*3/UL
NRBC BLD AUTO-RTO: 0 /100
PH UR STRIP: 7 PH (ref 5–7)
PLATELET # BLD AUTO: 326 10E9/L (ref 150–450)
POTASSIUM SERPL-SCNC: 3.6 MMOL/L (ref 3.4–5.3)
RBC # BLD AUTO: 5.25 10E12/L (ref 3.8–5.2)
RBC #/AREA URNS AUTO: <1 /HPF (ref 0–2)
SODIUM SERPL-SCNC: 139 MMOL/L (ref 133–144)
SOURCE: ABNORMAL
SP GR UR STRIP: 1 (ref 1–1.03)
SQUAMOUS #/AREA URNS AUTO: 1 /HPF (ref 0–1)
TROPONIN I SERPL-MCNC: <0.015 UG/L (ref 0–0.04)
UROBILINOGEN UR STRIP-MCNC: 0 MG/DL (ref 0–2)
WBC # BLD AUTO: 12.7 10E9/L (ref 4–11)
WBC #/AREA URNS AUTO: 4 /HPF (ref 0–5)

## 2018-08-18 PROCEDURE — 85025 COMPLETE CBC W/AUTO DIFF WBC: CPT | Performed by: EMERGENCY MEDICINE

## 2018-08-18 PROCEDURE — 99284 EMERGENCY DEPT VISIT MOD MDM: CPT | Mod: 25

## 2018-08-18 PROCEDURE — 81025 URINE PREGNANCY TEST: CPT | Performed by: EMERGENCY MEDICINE

## 2018-08-18 PROCEDURE — 25000128 H RX IP 250 OP 636: Performed by: EMERGENCY MEDICINE

## 2018-08-18 PROCEDURE — 93005 ELECTROCARDIOGRAM TRACING: CPT

## 2018-08-18 PROCEDURE — 84484 ASSAY OF TROPONIN QUANT: CPT | Performed by: EMERGENCY MEDICINE

## 2018-08-18 PROCEDURE — 81001 URINALYSIS AUTO W/SCOPE: CPT | Performed by: EMERGENCY MEDICINE

## 2018-08-18 PROCEDURE — 85379 FIBRIN DEGRADATION QUANT: CPT | Performed by: EMERGENCY MEDICINE

## 2018-08-18 PROCEDURE — 96360 HYDRATION IV INFUSION INIT: CPT

## 2018-08-18 PROCEDURE — 80048 BASIC METABOLIC PNL TOTAL CA: CPT | Performed by: EMERGENCY MEDICINE

## 2018-08-18 PROCEDURE — 83735 ASSAY OF MAGNESIUM: CPT | Performed by: EMERGENCY MEDICINE

## 2018-08-18 RX ORDER — SODIUM CHLORIDE 9 MG/ML
1000 INJECTION, SOLUTION INTRAVENOUS CONTINUOUS
Status: DISCONTINUED | OUTPATIENT
Start: 2018-08-18 | End: 2018-08-18 | Stop reason: HOSPADM

## 2018-08-18 RX ADMIN — SODIUM CHLORIDE 1000 ML: 9 INJECTION, SOLUTION INTRAVENOUS at 16:27

## 2018-08-18 NOTE — ED AVS SNAPSHOT
[facility]    [address]    [facility] [address]    Phone:  [phone]    Fax:  [phone]                                       Ethel Chi   MRN: [id number]    Department:  [facility]   Date of Visit:  8/18/2018           After Visit Summary Signature Page     I have received my discharge instructions, and my questions have been answered. I have discussed any challenges I see with this plan with the nurse or doctor.    ..........................................................................................................................................  Patient/Patient Representative Signature      ..........................................................................................................................................  Patient Representative Print Name and Relationship to Patient    ..................................................               ................................................  Date                                            Time    ..........................................................................................................................................  Reviewed by Signature/Title    ...................................................              ..............................................  Date                                                            Time

## 2018-08-18 NOTE — ED PROVIDER NOTES
History     Chief Complaint:  Palpitations and Dizziness     HPI   Ethel Chi is a 33 year old female who presents with palpitations and lightheadedness.  Patient has had a few weeks of intermittent lightheadedness/dizziness.  She was seen in clinic 2 days ago and had routine lab work including CBC, BMP, TSH which were not markedly abnormal.  The patient states that at 2:30 PM today she went to stand up and felt lightheaded/dizzy as though she was going to collapse. She had a few seconds of transient shortness of breath. Her symptoms improved slightly with sitting down.  She also had associated palpitations which have persisted since then.  She denies fever, cough, chest pain, shortness of breath, abdominal pain, diarrhea, blood in the stool, hematuria.  When asked she does endorse mild dysuria over the past few days as well as one episode of nonbloody emesis today.  Her  states that she does not eat very much.  Patient does take birth control.  She denies taking any other medications.  She denies recent long travel or leg swelling or history of blood clot.    Allergies:  No Known Allergies     Medications:      norethindrone-ethinyl estradiol (JUNEL FE 1/20) 1-20 MG-MCG per tablet       Past Medical History:    Past Medical History:   Diagnosis Date     Depression, major        Patient Active Problem List    Diagnosis Date Noted     Iron deficiency 02/11/2013     Priority: Medium     Vitamin D deficiency disease 02/11/2013     Priority: Medium     Gingival bleeding 02/11/2013     Priority: Medium     Fatigue 10/16/2012     Priority: Medium     Vitamin D deficiency 10/25/2011     Priority: Medium     Problem list name updated by automated process. Provider to review and confirm  [employer] Update utility       Vitamin B12 deficiency without anemia 10/25/2011     Priority: Medium     Diagnosis updated by automated process. Provider to review and confirm.       CARDIOVASCULAR SCREENING; LDL GOAL LESS THAN 160  10/31/2010     Priority: Medium     Varicosities 09/18/2008     Priority: Medium     Major depression in complete remission (H) 09/09/2008     Priority: Medium        Past Surgical History:    Past Surgical History:   Procedure Laterality Date     LAPAROSCOPIC CHOLECYSTECTOMY  2003        Family History:    family history includes Hypertension in her maternal grandmother.    Social History:   reports that she has never smoked. She has never used smokeless tobacco. She reports that she does not drink alcohol or use illicit drugs.    PCP: Carissa Benitze     Review of Systems  Positive for lightheadedness and palpitations.  Negative for chest pain, leg swelling, blood in stool, blood in urine, hematemesis.  All other systems negative.    Physical Exam     Patient Vitals for the past 24 hrs:   BP Temp Temp src Pulse Resp SpO2   08/18/18 1630 111/66 - - - - 95 %   08/18/18 1607 125/87 98  F (36.7  C) Oral 95 20 97 %       Physical Exam  Patient Vitals for the past 24 hrs:   BP Temp Temp src Pulse Resp SpO2   08/18/18 1630 111/66 - - - - 95 %   08/18/18 1607 125/87 98  F (36.7  C) Oral 95 20 97 %     HENT: Oropharynx clear, mucous membranes moist, conjunctiva without pallor.  EYES: Extraocular movements intact  CV: Rate as noted,  regular rhythm. no murmurs.   RESP: Effort normal. Breath sounds are normal bilaterally.  GI: Abdomen no tender, no distended.  NEURO: Alert, moving all extremities  MSK: No deformity of the extremities. No leg swelling.  SKIN: Warm and dry    Emergency Department Course     ECG (16:26:47):  Rate 85 bpm.   QT/QTc 388/461   P-R-T axes 45 33 27.   Interpretation: Normal sinus rhythm. Possible left atrial enlargement. T wave abnormality, consider anterior ischemia. Abnormal ECG.   Agree with computer interpretation.   New t wave inversions V2-V3.   Interpreted at 1632 by Duke Leyva MD.     Imaging:    No orders to display        Laboratory:    Labs Ordered and Resulted from Time of  ED Arrival Up to the Time of Departure from the ED   CBC WITH PLATELETS DIFFERENTIAL - Abnormal; Notable for the following:        Result Value    WBC 12.7 (*)     RBC Count 5.25 (*)     Hemoglobin 16.1 (*)     Hematocrit 48.9 (*)     Absolute Neutrophil 8.9 (*)     All other components within normal limits   BASIC METABOLIC PANEL - Abnormal; Notable for the following:     Glucose 100 (*)     All other components within normal limits   ROUTINE UA WITH MICROSCOPIC - Abnormal; Notable for the following:     Leukocyte Esterase Urine Trace (*)     Mucous Urine Present (*)     All other components within normal limits   D DIMER QUANTITATIVE   TROPONIN I   HCG QUALITATIVE URINE   MAGNESIUM   CARDIAC CONTINUOUS MONITORING   ORTHOSTATIC BLOOD PRESSURE AND PULSE        Interventions:    Medications   0.9% sodium chloride BOLUS (0 mLs Intravenous Stopped 8/18/18 1714)        Emergency Department Course:  Past medical records, nursing notes, and vitals reviewed.  4:25 PM I performed an exam of the patient and obtained history, as documented above.  I rechecked the patient. Findings and plan explained to the Patient and her significant other.    Impression & Plan      Medical Decision Making:    The patient presented to the ER with lightheadedness and palpitations.  These have been ongoing for the past few weeks but worse today.  ECG did not show arrhythmia or obvious signs of ischemia.  Troponin was negative.  I do not suspect myocardial ischemia.  D-dimer testing was negative thus a low suspicion for PE.  Her electrolytes were all within normal limits.  Her pregnancy test was negative.  Hemoglobin was stable without any signs of anemia.  Patient was noted to have evidence of orthostasis on vital signs and her blood pressure decreased by 20 systolic upon standing.  After review of her initial workup the patient desired to leave.  She declined further administration of her IV fluid bolus.  She understands to push fluids in  order to stay hydrated.  Close return precautions were discussed.  Plan is to follow-up in clinic next week.    Diagnosis:    ICD-10-CM    1. Lightheadedness R42    2. Orthostasis I95.1         Discharge Medications:  Discharge Medication List as of 8/18/2018  5:14 PM           8/18/2018   Duke Leyva MD Lindenbaum, Elan, MD  08/19/18 0056       Duke Leyva MD  08/19/18 1139

## 2018-08-18 NOTE — ED AVS SNAPSHOT
[facility] Emergency Department    [address]    [facility] [address]    Phone:  [phone]    Fax:  [phone]                                       Ethel Chi   MRN: [id number]    Department:  [facility] Emergency Department   Date of Visit:  8/18/2018           Patient Information     Date Of Birth          1985        Your diagnoses for this visit were:     Lightheadedness     Orthostasis        You were seen by Duke Leyva MD.      Follow-up Information     Follow up with [facility] Emergency Department.    Specialty:  EMERGENCY MEDICINE    Why:  As needed, If symptoms worsen    Contact information:    [address]  [facility] [address] [phone]        Follow up with Carissa Benitez APRN CNM. Schedule an appointment as soon as possible for a visit in 1 week.    Specialty:  OB/Gyn    Contact information:    [address]  [place] [address]  [phone]          Discharge Instructions       Discharge Instructions  Dizziness (Lightheaded)  Today you were seen for dizziness.  Dizziness can be caused by many things and it can be very difficult to determine the cause of dizziness.  At this time, your provider has found no signs that your dizziness is due to a serious or life-threatening condition. However, sometimes there is a serious problem that does not show up right away, and it is important for you to follow up with your regular provider as instructed.  Generally, every Emergency Department visit should have a follow-up clinic visit with either a primary or a specialty clinic/provider. Please follow-up as instructed by your emergency provider today.      Return to the Emergency Department if:      You pass out (fainting or falling out), especially during exercise.      You develop chest pain, chest pressure or difficulty breathing.    Your feel an irregular heartbeat.    You have excessive vaginal  bleeding, or blood in your stool or vomit (throw up).    You have a high fever.    Your symptoms get worse or more frequent.    If when you begin to feel dizzy or lightheaded, it is important to sit down or lay down immediately to prevent injury from falling.  If you were given a prescription for medicine here today, be sure to read all of the information (including the package insert) that comes with your prescription.  This will include important information about the medicine, its side effects, and any warnings that you need to know about.  The pharmacist who fills the prescription can provide more information and answer questions you may have about the medicine.  If you have questions or concerns that the pharmacist cannot address, please call or return to the Emergency Department.   Remember that you can always come back to the Emergency Department if you are not able to see your regular provider in the amount of time listed above, if you get any new symptoms, or if there is anything that worries you.    24 Hour Appointment Hotline       To make an appointment at any [facility], call [phone] ([phone]). If you don't have a family doctor or clinic, we will help you find one. [facility] are conveniently located to serve the needs of you and your family.             Review of your medicines      Our records show that you are taking the medicines listed below. If these are incorrect, please call your family doctor or clinic.        Dose / Directions Last dose taken    norethindrone-ethinyl estradiol 1-20 MG-MCG per tablet   Commonly known as:  JUNEL FE 1/20   Dose:  1 tablet   Quantity:  84 tablet        Take 1 tablet by mouth daily   Refills:  3                Procedures and tests performed during your visit     Basic metabolic panel    CBC with platelets differential    Cardiac Continuous Monitoring    D dimer quantitative    EKG 12-lead, tracing only    HCG qualitative urine (UPT)     Magnesium    Orthostatic blood pressure and pulse    Troponin I    UA with Microscopic      Orders Needing Specimen Collection     None      Pending Results     No orders found from 8/16/2018 to 8/19/2018.            Pending Culture Results     No orders found from 8/16/2018 to 8/19/2018.            Pending Results Instructions     If you had any lab results that were not finalized at the time of your Discharge, you can call the ED Lab Result RN at [phone]. You will be contacted by this team for any positive Lab results or changes in treatment. The nurses are available 7 days a week from 10A to 6:30P.  You can leave a message 24 hours per day and they will return your call.        Test Results From Your Hospital Stay        8/18/2018  4:30 PM      Component Results     Component Value Ref Range & Units Status    WBC 12.7 (H) 4.0 - 11.0 10e9/L Final    RBC Count 5.25 (H) 3.8 - 5.2 10e12/L Final    Hemoglobin 16.1 (H) 11.7 - 15.7 g/dL Final    Hematocrit 48.9 (H) 35.0 - 47.0 % Final    MCV 93 78 - 100 fl Final    MCH 30.7 26.5 - 33.0 pg Final    MCHC 32.9 31.5 - 36.5 g/dL Final    RDW 13.4 10.0 - 15.0 % Final    Platelet Count 326 150 - 450 10e9/L Final    Diff Method Automated Method  Final    % Neutrophils 70.0 % Final    % Lymphocytes 23.7 % Final    % Monocytes 5.4 % Final    % Eosinophils 0.2 % Final    % Basophils 0.5 % Final    % Immature Granulocytes 0.2 % Final    Nucleated RBCs 0 0 /100 Final    Absolute Neutrophil 8.9 (H) 1.6 - 8.3 10e9/L Final    Absolute Lymphocytes 3.0 0.8 - 5.3 10e9/L Final    Absolute Monocytes 0.7 0.0 - 1.3 10e9/L Final    Absolute Eosinophils 0.0 0.0 - 0.7 10e9/L Final    Absolute Basophils 0.1 0.0 - 0.2 10e9/L Final    Abs Immature Granulocytes 0.0 0 - 0.4 10e9/L Final    Absolute Nucleated RBC 0.0  Final         8/18/2018  4:43 PM      Component Results     Component Value Ref Range & Units Status    D Dimer 0.4 0.0 - 0.50 ug/ml FEU Final    This D-dimer assay is intended for  use in conjunction with a clinical pretest   probability assessment model to exclude pulmonary embolism (PE) and deep   venous thrombosis (DVT) in outpatients suspected of PE or DVT. The cut-off   value is 0.5 ug/mL FEU.           8/18/2018  4:49 PM      Component Results     Component Value Ref Range & Units Status    Sodium 139 133 - 144 mmol/L Final    Potassium 3.6 3.4 - 5.3 mmol/L Final    Chloride 106 94 - 109 mmol/L Final    Carbon Dioxide 25 20 - 32 mmol/L Final    Anion Gap 8 3 - 14 mmol/L Final    Glucose 100 (H) 70 - 99 mg/dL Final    Urea Nitrogen 8 7 - 30 mg/dL Final    Creatinine 0.65 0.52 - 1.04 mg/dL Final    GFR Estimate >90 >60 mL/min/1.7m2 Final    Non  GFR Calc    GFR Estimate If Black >90 >60 mL/min/1.7m2 Final    African American GFR Calc    Calcium 9.2 8.5 - 10.1 mg/dL Final         8/18/2018  4:49 PM      Component Results     Component Value Ref Range & Units Status    Troponin I ES <0.015 0.000 - 0.045 ug/L Final    The 99th percentile for upper reference range is 0.045 ug/L.  Troponin values   in the range of 0.045 - 0.120 ug/L may be associated with risks of adverse   clinical events.           8/18/2018  5:05 PM      Component Results     Component Value Ref Range & Units Status    Color Urine Straw  Final    Appearance Urine Clear  Final    Glucose Urine Negative NEG^Negative mg/dL Final    Bilirubin Urine Negative NEG^Negative Final    Ketones Urine Negative NEG^Negative mg/dL Final    Specific Gravity Urine 1.003 1.003 - 1.035 Final    Blood Urine Negative NEG^Negative Final    pH Urine 7.0 5.0 - 7.0 pH Final    Protein Albumin Urine Negative NEG^Negative mg/dL Final    Urobilinogen mg/dL 0.0 0.0 - 2.0 mg/dL Final    Nitrite Urine Negative NEG^Negative Final    Leukocyte Esterase Urine Trace (A) NEG^Negative Final    Source Midstream Urine  Final    WBC Urine 4 0 - 5 /HPF Final    RBC Urine <1 0 - 2 /HPF Final    Squamous Epithelial /HPF Urine 1 0 - 1 /HPF Final     Mucous Urine Present (A) NEG^Negative /LPF Final         8/18/2018  5:06 PM      Component Results     Component Value Ref Range & Units Status    HCG Qual Urine Negative NEG^Negative Final    This test is for screening purposes.  Results should be interpreted along with   the clinical picture.  Confirmation testing is available if warranted by   ordering SYQ533, HCG Quantitative Pregnancy.           8/18/2018  5:02 PM      Component Results     Component Value Ref Range & Units Status    Magnesium 2.2 1.6 - 2.3 mg/dL Final                Clinical Quality Measure: Blood Pressure Screening     Your blood pressure was checked while you were in the emergency department today. The last reading we obtained was  BP: 111/66 . Please read the guidelines below about what these numbers mean and what you should do about them.  If your systolic blood pressure (the top number) is less than 120 and your diastolic blood pressure (the bottom number) is less than 80, then your blood pressure is normal. There is nothing more that you need to do about it.  If your systolic blood pressure (the top number) is 120-139 or your diastolic blood pressure (the bottom number) is 80-89, your blood pressure may be higher than it should be. You should have your blood pressure rechecked within a year by a primary care provider.  If your systolic blood pressure (the top number) is 140 or greater or your diastolic blood pressure (the bottom number) is 90 or greater, you may have high blood pressure. High blood pressure is treatable, but if left untreated over time it can put you at risk for heart attack, stroke, or kidney failure. You should have your blood pressure rechecked by a primary care provider within the next 4 weeks.  If your provider in the emergency department today gave you specific instructions to follow-up with your doctor or provider even sooner than that, you should follow that instruction and not wait for up to 4 weeks for your  follow-up visit.        Thank you for choosing [facility]       Thank you for choosing [facility] for your care. Our goal is always to provide you with excellent care. Hearing back from our patients is one way we can continue to improve our services. Please take a few minutes to complete the written survey that you may receive in the mail after you visit with us. Thank you!        Care EveryWhere ID     This is your Care EveryWhere ID. This could be used by other organizations to access your [facility] medical records  [id number]        Equal Access to Services     SHOLA WEST : Sergei babbo Socayla, waaxda luqadaha, qaybta kaalmada kari, trisha guerra. So [facility] [phone].    ATENCIÓN: Si habla español, tiene a veloz disposición servicios gratuitos de asistencia lingüística. [facility] [phone].    We comply with applicable federal civil rights laws and [place] laws. We do not discriminate on the basis of race, color, national origin, age, disability, sex, sexual orientation, or gender identity.            After Visit Summary       This is your record. Keep this with you and show to your community pharmacist(s) and doctor(s) at your next visit.

## 2018-08-20 LAB — INTERPRETATION ECG - MUSE: NORMAL

## 2018-09-09 ENCOUNTER — OFFICE VISIT (OUTPATIENT)
Dept: URGENT CARE | Facility: URGENT CARE | Age: 33
End: 2018-09-09
Payer: COMMERCIAL

## 2018-09-09 VITALS
TEMPERATURE: 98.1 F | SYSTOLIC BLOOD PRESSURE: 102 MMHG | WEIGHT: 178 LBS | HEART RATE: 60 BPM | OXYGEN SATURATION: 98 % | DIASTOLIC BLOOD PRESSURE: 74 MMHG | RESPIRATION RATE: 16 BRPM | BODY MASS INDEX: 30.55 KG/M2

## 2018-09-09 DIAGNOSIS — R30.0 DYSURIA: Primary | ICD-10-CM

## 2018-09-09 DIAGNOSIS — Z32.01 PREGNANCY TEST POSITIVE: ICD-10-CM

## 2018-09-09 DIAGNOSIS — R53.83 FEELING TIRED: ICD-10-CM

## 2018-09-09 DIAGNOSIS — R82.90 NONSPECIFIC FINDING ON EXAMINATION OF URINE: ICD-10-CM

## 2018-09-09 LAB
ALBUMIN UR-MCNC: NEGATIVE MG/DL
APPEARANCE UR: CLEAR
BACTERIA #/AREA URNS HPF: ABNORMAL /HPF
BETA HCG QUAL IFA URINE: POSITIVE
BILIRUB UR QL STRIP: ABNORMAL
COLOR UR AUTO: YELLOW
GLUCOSE UR STRIP-MCNC: NEGATIVE MG/DL
HGB UR QL STRIP: ABNORMAL
KETONES UR STRIP-MCNC: ABNORMAL MG/DL
LEUKOCYTE ESTERASE UR QL STRIP: NEGATIVE
MUCOUS THREADS #/AREA URNS LPF: PRESENT /LPF
NITRATE UR QL: NEGATIVE
NON-SQ EPI CELLS #/AREA URNS LPF: ABNORMAL /LPF
PH UR STRIP: 5.5 PH (ref 5–7)
RBC #/AREA URNS AUTO: ABNORMAL /HPF
SOURCE: ABNORMAL
SP GR UR STRIP: >1.03 (ref 1–1.03)
UROBILINOGEN UR STRIP-ACNC: 1 EU/DL (ref 0.2–1)
WBC #/AREA URNS AUTO: ABNORMAL /HPF

## 2018-09-09 PROCEDURE — 84703 CHORIONIC GONADOTROPIN ASSAY: CPT | Performed by: FAMILY MEDICINE

## 2018-09-09 PROCEDURE — 87086 URINE CULTURE/COLONY COUNT: CPT | Performed by: FAMILY MEDICINE

## 2018-09-09 PROCEDURE — 81001 URINALYSIS AUTO W/SCOPE: CPT | Performed by: FAMILY MEDICINE

## 2018-09-09 PROCEDURE — 99214 OFFICE O/P EST MOD 30 MIN: CPT | Performed by: FAMILY MEDICINE

## 2018-09-09 RX ORDER — CEFDINIR 300 MG/1
300 CAPSULE ORAL 2 TIMES DAILY
Qty: 14 CAPSULE | Refills: 0 | Status: SHIPPED | OUTPATIENT
Start: 2018-09-09 | End: 2018-09-16

## 2018-09-09 NOTE — MR AVS SNAPSHOT
After Visit Summary   9/9/2018    Ethel Chi    MRN: [id number]           Patient Information     Date Of Birth          1985        Visit Information        Provider Department      9/9/2018 12:25 PM Blanche Hunter MD [facility]        Today's Diagnoses     Dysuria    -  1    Nonspecific finding on examination of urine        Feeling tired        Pregnancy test positive           Follow-ups after your visit        Who to contact     If you have questions or need follow up information about today's clinic visit or your schedule please contact [facility] directly at [phone].  Normal or non-critical lab and imaging results will be communicated to you by MyChart, letter or phone within 4 business days after the clinic has received the results. If you do not hear from us within 7 days, please contact the clinic through MyChart or phone. If you have a critical or abnormal lab result, we will notify you by phone as soon as possible.  Submit refill requests through [employer] or call your pharmacy and they will forward the refill request to us. Please allow 3 business days for your refill to be completed.          Additional Information About Your Visit        Care EveryWhere ID     This is your Care EveryWhere ID. This could be used by other organizations to access your [place] medical records  [id number]        Your Vitals Were     Pulse Temperature Respirations Last Period Pulse Oximetry Breastfeeding?    60 98.1  F (36.7  C) (Oral) 16 08/15/2018 (Approximate) 98% No    BMI (Body Mass Index)                   30.55 kg/m2            Blood Pressure from Last 3 Encounters:   09/09/18 102/74   08/18/18 111/66   08/16/18 94/58    Weight from Last 3 Encounters:   09/09/18 178 lb (80.7 kg)   08/16/18 183 lb 14.4 oz (83.4 kg)   05/30/18 181 lb (82.1 kg)              We Performed the Following     Beta HCG Qual, Urine - [facility] and [place]  ([id number])     UA with Microscopic reflex to Culture     Urine Culture Aerobic Bacterial          Today's Medication Changes          These changes are accurate as of 9/9/18  1:19 PM.  If you have any questions, ask your nurse or doctor.               Start taking these medicines.        Dose/Directions    cefdinir 300 MG capsule   Commonly known as:  OMNICEF   Used for:  Dysuria   Started by:  Blanche Hunter MD        Dose:  300 mg   Take 1 capsule (300 mg) by mouth 2 times daily for 7 days   Quantity:  14 capsule   Refills:  0            Where to get your medicines      These medications were sent to [facility] [address] [address]     Phone:  [phone]     cefdinir 300 MG capsule                Primary Care Provider Office Phone # Fax #    Carissa Benitez, [facility] [phone] [address]  [place] [address]        Equal Access to Services     SHOLA WEST : Hadii aad ku hadasho Soomaali, waaxda luqadaha, qaybta kaalmada adeegyada, waxay idiin hayaan cathy phan . So [facility] [phone].    ATENCIÓN: Si habla español, tiene a veloz disposición servicios gratuitos de asistencia lingüística. Llame al [phone].    We comply with applicable federal civil rights laws and [place] laws. We do not discriminate on the basis of race, color, national origin, age, disability, sex, sexual orientation, or gender identity.            Thank you!     Thank you for choosing [facility]  for your care. Our goal is always to provide you with excellent care. Hearing back from our patients is one way we can continue to improve our services. Please take a few minutes to complete the written survey that you may receive in the mail after your visit with us. Thank you!             Your Updated Medication List - Protect others around you: Learn how to safely use, store and throw away your  medicines at www.disposemymeds.org.          This list is accurate as of 9/9/18  1:19 PM.  Always use your most recent med list.                   Brand Name Dispense Instructions for use Diagnosis    cefdinir 300 MG capsule    OMNICEF    14 capsule    Take 1 capsule (300 mg) by mouth 2 times daily for 7 days    Dysuria       norethindrone-ethinyl estradiol 1-20 MG-MCG per tablet    JUNEL FE 1/20    84 tablet    Take 1 tablet by mouth daily    Encounter for refill of prescription for contraception

## 2018-09-09 NOTE — PROGRESS NOTES
SUBJECTIVE:   Ethel Chi is a 33 year old female who  presents today for a possible UTI. Symptoms of dysuria and frequency have been going on for 7day(s).  Hematuria no.  sudden onsetand moderate.  There is no history of fever, chills, nausea or vomiting.  No history of vaginal discharge. This patient does not  have a history of urinary tract infections. Patient denies rigors, flank pain, temperature > 101 degrees F. and Vomiting, significant nausea or diarrhea or vaginal discharge   She also missed her ocp  2 weeks back  so worried if she is pregnant   She has been feeling tired lately     Past Medical History:   Diagnosis Date     Depression, major     Admitted 2008     Current Outpatient Prescriptions   Medication Sig Dispense Refill     cefdinir (OMNICEF) 300 MG capsule Take 1 capsule (300 mg) by mouth 2 times daily for 7 days 14 capsule 0     norethindrone-ethinyl estradiol (JUNEL FE 1/20) 1-20 MG-MCG per tablet Take 1 tablet by mouth daily 84 tablet 3     Social History   Substance Use Topics     Smoking status: Never Smoker     Smokeless tobacco: Never Used     Alcohol use No       ROS:   10 point ROS of systems including Constitutional, Eyes, Respiratory, Cardiovascular, Gastroenterology, , Integumentary, Muscularskeletal, Psychiatric were all negative except for pertinent positives noted in my HPI           OBJECTIVE:  /74 (BP Location: Right arm, Patient Position: Sitting, Cuff Size: Adult Regular)  Pulse 60  Temp 98.1  F (36.7  C) (Oral)  Resp 16  Wt 178 lb (80.7 kg)  LMP 08/15/2018 (Approximate)  SpO2 98%  Breastfeeding? No  BMI 30.55 kg/m2  GENERAL APPEARANCE: healthy, alert and no distress  RESP: lungs clear to auscultation - no rales, rhonchi or wheezes  CV: regular rates and rhythm, normal S1 S2, no murmur noted  ABDOMEN:  soft, nontender, no HSM or masses and bowel sounds normal  BACK: No CVA tenderness  SKIN: no suspicious lesions or rashes      Results for orders placed or  performed in visit on 09/09/18   Beta HCG Qual, Urine - [facility] [place] ([id number])   Result Value Ref Range    Beta HCG Qual IFA Urine Positive (A) NEG^Negative      UA with Microscopic reflex to Culture   Result Value Ref Range    Color Urine Yellow     Appearance Urine Clear     Glucose Urine Negative NEG^Negative mg/dL    Bilirubin Urine Small (A) NEG^Negative    Ketones Urine Trace (A) NEG^Negative mg/dL    Specific Gravity Urine >1.030 1.003 - 1.035    pH Urine 5.5 5.0 - 7.0 pH    Protein Albumin Urine Negative NEG^Negative mg/dL    Urobilinogen Urine 1.0 0.2 - 1.0 EU/dL    Nitrite Urine Negative NEG^Negative    Blood Urine Trace (A) NEG^Negative    Leukocyte Esterase Urine Negative NEG^Negative    Source Midstream Urine     WBC Urine 10-25 (A) OTO5^0 - 5 /HPF    RBC Urine O - 2 OTO2^O - 2 /HPF    Squamous Epithelial /LPF Urine Few FEW^Few /LPF    Bacteria Urine Few (A) NEG^Negative /HPF    Mucous Urine Present (A) NEG^Negative /LPF       ASSESSMENT:   Ethel was seen today for urgent care.    Diagnoses and all orders for this visit:    Dysuria  -     Beta HCG Qual, Urine - [facility] [place] ([id number])  -     UA with Microscopic reflex to Culture  -     cefdinir (OMNICEF) 300 MG capsule; Take 1 capsule (300 mg) by mouth 2 times daily for 7 days    Nonspecific finding on examination of urine  -     Urine Culture Aerobic Bacterial    Feeling tired  -     Beta HCG Qual, Urine - [facility] and [place] ([id number])    Pregnancy test positive          PLAN:  As per ordered above  1.Drink plenty of fluids.  Prevention and treatment of UTI's discussed.Signs and symptoms of pyelonephritis mentioned.  Follow up with primary care physician if not improving  2.Reviewed test results with pt   3.Start antibiotic   4.follow up with ob  Follow up if  symptoms fail to improve or worsens   Pt understood and agreed with plan     Blanche Hunter MD

## 2018-09-11 LAB
BACTERIA SPEC CULT: NORMAL
SPECIMEN SOURCE: NORMAL

## 2018-09-29 ENCOUNTER — HOSPITAL ENCOUNTER (EMERGENCY)
Facility: CLINIC | Age: 33
Discharge: HOME | End: 2018-09-29
Attending: EMERGENCY MEDICINE | Admitting: EMERGENCY MEDICINE
Payer: COMMERCIAL

## 2018-09-29 VITALS
WEIGHT: 168 LBS | SYSTOLIC BLOOD PRESSURE: 115 MMHG | DIASTOLIC BLOOD PRESSURE: 65 MMHG | HEIGHT: 66 IN | RESPIRATION RATE: 16 BRPM | BODY MASS INDEX: 27 KG/M2 | TEMPERATURE: 97.6 F | OXYGEN SATURATION: 98 % | HEART RATE: 63 BPM

## 2018-09-29 DIAGNOSIS — R00.2 PALPITATIONS: ICD-10-CM

## 2018-09-29 DIAGNOSIS — F43.9 SITUATIONAL STRESS: ICD-10-CM

## 2018-09-29 LAB
ANION GAP SERPL CALCULATED.3IONS-SCNC: 9 MMOL/L (ref 3–14)
B-HCG FREE SERPL-ACNC: <5 IU/L
BASOPHILS # BLD AUTO: 0.1 10E9/L (ref 0–0.2)
BASOPHILS NFR BLD AUTO: 0.5 %
BUN SERPL-MCNC: 7 MG/DL (ref 7–30)
CALCIUM SERPL-MCNC: 9.2 MG/DL (ref 8.5–10.1)
CHLORIDE SERPL-SCNC: 106 MMOL/L (ref 94–109)
CO2 SERPL-SCNC: 25 MMOL/L (ref 20–32)
CREAT SERPL-MCNC: 0.7 MG/DL (ref 0.52–1.04)
DIFFERENTIAL METHOD BLD: ABNORMAL
EOSINOPHIL # BLD AUTO: 0.1 10E9/L (ref 0–0.7)
EOSINOPHIL NFR BLD AUTO: 0.5 %
ERYTHROCYTE [DISTWIDTH] IN BLOOD BY AUTOMATED COUNT: 13.7 % (ref 10–15)
GFR SERPL CREATININE-BSD FRML MDRD: >90 ML/MIN/1.7M2
GLUCOSE SERPL-MCNC: 120 MG/DL (ref 70–99)
HCT VFR BLD AUTO: 48.8 % (ref 35–47)
HGB BLD-MCNC: 16.1 G/DL (ref 11.7–15.7)
IMM GRANULOCYTES # BLD: 0 10E9/L (ref 0–0.4)
IMM GRANULOCYTES NFR BLD: 0.3 %
INTERPRETATION ECG - MUSE: NORMAL
LYMPHOCYTES # BLD AUTO: 2.1 10E9/L (ref 0.8–5.3)
LYMPHOCYTES NFR BLD AUTO: 20.1 %
MCH RBC QN AUTO: 30.3 PG (ref 26.5–33)
MCHC RBC AUTO-ENTMCNC: 33 G/DL (ref 31.5–36.5)
MCV RBC AUTO: 92 FL (ref 78–100)
MONOCYTES # BLD AUTO: 0.7 10E9/L (ref 0–1.3)
MONOCYTES NFR BLD AUTO: 6.4 %
NEUTROPHILS # BLD AUTO: 7.4 10E9/L (ref 1.6–8.3)
NEUTROPHILS NFR BLD AUTO: 72.2 %
NRBC # BLD AUTO: 0 10*3/UL
NRBC BLD AUTO-RTO: 0 /100
PLATELET # BLD AUTO: 348 10E9/L (ref 150–450)
POTASSIUM SERPL-SCNC: 3.7 MMOL/L (ref 3.4–5.3)
RBC # BLD AUTO: 5.32 10E12/L (ref 3.8–5.2)
SODIUM SERPL-SCNC: 140 MMOL/L (ref 133–144)
TSH SERPL DL<=0.005 MIU/L-ACNC: 2.22 MU/L (ref 0.4–4)
WBC # BLD AUTO: 10.3 10E9/L (ref 4–11)

## 2018-09-29 PROCEDURE — 84443 ASSAY THYROID STIM HORMONE: CPT | Performed by: EMERGENCY MEDICINE

## 2018-09-29 PROCEDURE — 80048 BASIC METABOLIC PNL TOTAL CA: CPT | Performed by: EMERGENCY MEDICINE

## 2018-09-29 PROCEDURE — 25000128 H RX IP 250 OP 636: Performed by: EMERGENCY MEDICINE

## 2018-09-29 PROCEDURE — 99284 EMERGENCY DEPT VISIT MOD MDM: CPT | Mod: 25

## 2018-09-29 PROCEDURE — 96360 HYDRATION IV INFUSION INIT: CPT

## 2018-09-29 PROCEDURE — 84702 CHORIONIC GONADOTROPIN TEST: CPT

## 2018-09-29 PROCEDURE — 85025 COMPLETE CBC W/AUTO DIFF WBC: CPT | Performed by: EMERGENCY MEDICINE

## 2018-09-29 PROCEDURE — 93005 ELECTROCARDIOGRAM TRACING: CPT

## 2018-09-29 RX ORDER — SODIUM CHLORIDE 9 MG/ML
1000 INJECTION, SOLUTION INTRAVENOUS CONTINUOUS
Status: DISCONTINUED | OUTPATIENT
Start: 2018-09-29 | End: 2018-09-29 | Stop reason: HOSPADM

## 2018-09-29 RX ORDER — LIDOCAINE 40 MG/G
CREAM TOPICAL
Status: DISCONTINUED | OUTPATIENT
Start: 2018-09-29 | End: 2018-09-29 | Stop reason: HOSPADM

## 2018-09-29 RX ORDER — LORAZEPAM 1 MG/1
1 TABLET ORAL ONCE
Status: DISCONTINUED | OUTPATIENT
Start: 2018-09-29 | End: 2018-09-29 | Stop reason: HOSPADM

## 2018-09-29 RX ORDER — MULTIPLE VITAMINS W/ MINERALS TAB 9MG-400MCG
1 TAB ORAL DAILY
COMMUNITY
End: 2019-08-01

## 2018-09-29 RX ADMIN — SODIUM CHLORIDE 1000 ML: 9 INJECTION, SOLUTION INTRAVENOUS at 03:48

## 2018-09-29 ASSESSMENT — ENCOUNTER SYMPTOMS
NERVOUS/ANXIOUS: 1
DIZZINESS: 1
NUMBNESS: 1
SHORTNESS OF BREATH: 1
PALPITATIONS: 1
ABDOMINAL PAIN: 0

## 2018-09-29 NOTE — ED AVS SNAPSHOT
[facility] Emergency Department    [address]    [facility] [address]    Phone:  [phone]    Fax:  [phone]                                       Ethel Chi   MRN: [id number]    Department:  [facility] Emergency Department   Date of Visit:  9/29/2018           After Visit Summary Signature Page     I have received my discharge instructions, and my questions have been answered. I have discussed any challenges I see with this plan with the nurse or doctor.    ..........................................................................................................................................  Patient/Patient Representative Signature      ..........................................................................................................................................  Patient Representative Print Name and Relationship to Patient    ..................................................               ................................................  Date                                   Time    ..........................................................................................................................................  Reviewed by Signature/Title    ...................................................              ..............................................  Date                                               Time          22EPIC Rev 08/18
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
## 2018-09-29 NOTE — ED AVS SNAPSHOT
[facility] Emergency Department    [address]    [facility] [place] [address]    Phone:  [phone]    Fax:  [phone]                                       Ethel Chi   MRN: [id number]    Department:  [facility] Emergency Department   Date of Visit:  9/29/2018           Patient Information     Date Of Birth          1985        Your diagnoses for this visit were:     Palpitations     Situational stress        You were seen by Asia Ruiz MD.      Follow-up Information     Follow up with [facility]    Why:  within 3 days for reassessment    Contact information:    [address] [address]  [phone]          Follow up with [facility] Emergency Department.    Specialty:  EMERGENCY MEDICINE    Why:  As needed, If symptoms worsen    Contact information:    [address]  [facility] [id number] [phone]        Discharge Instructions       Discharge Instructions  Palpitations    Palpitations are an unusual awareness of your heartbeat. People often describe this as the heart skipping, fluttering, racing, irregular, or pounding. At this time, your provider has found no signs that your palpitations are due to a serious or life-threatening condition. However, sometimes there is a serious problem that does not show up right away.    Palpitations can be caused by caffeine, cigarettes, diet pills, energy drinks or supplements, other stimulants, and medications and street drugs. They can also be caused by anxiety, hormone conditions such as high thyroid, and other medical conditions. Sometimes they are a sign of abnormal rhythm in the heart. At this time, your provider did not find any dangerous cause of your symptoms.    Generally, every Emergency Department visit should have a follow-up clinic visit with either a primary or a specialty clinic/provider. Please follow-up as instructed by your emergency  provider today.    Return to the Emergency Department if:    You get chest pain or tightness.    You are short of breath.    You get very weak or tired.    You pass out or faint.    Your heart rate is over 120 beats per minute for more than 10 minutes while you are resting.     You have anything else that worries you.    What can I do to help myself?    Fill any prescriptions the provider gave you and take them right away.     Follow your provider s instructions about the prescription medicines you are on. Sometimes the provider may tell you to stop taking a medicine or change the dose.    If you smoke, this may be a good time to quit! The less you can smoke, the better.    Do not use energy drinks, diet pills, or stimulants. Limit your use of caffeine.  If you were given a prescription for medicine here today, be sure to read all of the information (including the package insert) that comes with your prescription.  This will include important information about the medicine, its side effects, and any warnings that you need to know about.  The pharmacist who fills the prescription can provide more information and answer questions you may have about the medicine.  If you have questions or concerns that the pharmacist cannot address, please call or return to the Emergency Department.     Remember that you can always come back to the Emergency Department if you are not able to see your regular provider in the amount of time listed above, if you get any new symptoms, or if there is anything that worries you.        24 Hour Appointment Hotline       To make an appointment at any [facility], call [phone] ([phone]). If you don't have a family doctor or clinic, we will help you find one. [facility] are conveniently located to serve the needs of you and your family.             Review of your medicines      Our records show that you are taking the medicines listed below. If these are incorrect, please  call your family doctor or clinic.        Dose / Directions Last dose taken    Multi-vitamin Tabs tablet   Dose:  1 tablet        Take 1 tablet by mouth daily   Refills:  0        norethindrone-ethinyl estradiol 1-20 MG-MCG per tablet   Commonly known as:  JUNEL FE 1/20   Dose:  1 tablet   Quantity:  84 tablet        Take 1 tablet by mouth daily   Refills:  3                Procedures and tests performed during your visit     Basic metabolic panel    CBC with platelets differential    EKG 12 lead    ISTAT HCG Quantitative Pregnancy POCT    TSH      Orders Needing Specimen Collection     None      Pending Results     Date and Time Order Name Status Description    9/29/2018 0315 EKG 12 lead Preliminary             Pending Culture Results     No orders found from 9/27/2018 to 9/30/2018.            Pending Results Instructions     If you had any lab results that were not finalized at the time of your Discharge, you can call the ED Lab Result RN at [phone]. You will be contacted by this team for any positive Lab results or changes in treatment. The nurses are available 7 days a week from 10A to 6:30P.  You can leave a message 24 hours per day and they will return your call.        Test Results From Your Hospital Stay        9/29/2018  4:00 AM      Component Results     Component Value Ref Range & Units Status    WBC 10.3 4.0 - 11.0 10e9/L Final    RBC Count 5.32 (H) 3.8 - 5.2 10e12/L Final    Hemoglobin 16.1 (H) 11.7 - 15.7 g/dL Final    Hematocrit 48.8 (H) 35.0 - 47.0 % Final    MCV 92 78 - 100 fl Final    MCH 30.3 26.5 - 33.0 pg Final    MCHC 33.0 31.5 - 36.5 g/dL Final    RDW 13.7 10.0 - 15.0 % Final    Platelet Count 348 150 - 450 10e9/L Final    Diff Method Automated Method  Final    % Neutrophils 72.2 % Final    % Lymphocytes 20.1 % Final    % Monocytes 6.4 % Final    % Eosinophils 0.5 % Final    % Basophils 0.5 % Final    % Immature Granulocytes 0.3 % Final    Nucleated RBCs 0 0 /100 Final    Absolute  Neutrophil 7.4 1.6 - 8.3 10e9/L Final    Absolute Lymphocytes 2.1 0.8 - 5.3 10e9/L Final    Absolute Monocytes 0.7 0.0 - 1.3 10e9/L Final    Absolute Eosinophils 0.1 0.0 - 0.7 10e9/L Final    Absolute Basophils 0.1 0.0 - 0.2 10e9/L Final    Abs Immature Granulocytes 0.0 0 - 0.4 10e9/L Final    Absolute Nucleated RBC 0.0  Final         9/29/2018  4:14 AM      Component Results     Component Value Ref Range & Units Status    Sodium 140 133 - 144 mmol/L Final    Potassium 3.7 3.4 - 5.3 mmol/L Final    Chloride 106 94 - 109 mmol/L Final    Carbon Dioxide 25 20 - 32 mmol/L Final    Anion Gap 9 3 - 14 mmol/L Final    Glucose 120 (H) 70 - 99 mg/dL Final    Urea Nitrogen 7 7 - 30 mg/dL Final    Creatinine 0.70 0.52 - 1.04 mg/dL Final    GFR Estimate >90 >60 mL/min/1.7m2 Final    Non  GFR Calc    GFR Estimate If Black >90 >60 mL/min/1.7m2 Final    African American GFR Calc    Calcium 9.2 8.5 - 10.1 mg/dL Final         9/29/2018  4:18 AM      Component Results     Component Value Ref Range & Units Status    TSH 2.22 0.40 - 4.00 mU/L Final         9/29/2018  4:04 AM      Component Results     Component Value Ref Range & Units Status    HCG Quantitative Serum <5.0 <5.0 IU/L Final                Clinical Quality Measure: Blood Pressure Screening     Your blood pressure was checked while you were in the emergency department today. The last reading we obtained was  BP: (!) 146/93 . Please read the guidelines below about what these numbers mean and what you should do about them.  If your systolic blood pressure (the top number) is less than 120 and your diastolic blood pressure (the bottom number) is less than 80, then your blood pressure is normal. There is nothing more that you need to do about it.  If your systolic blood pressure (the top number) is 120-139 or your diastolic blood pressure (the bottom number) is 80-89, your blood pressure may be higher than it should be. You should have your blood pressure  rechecked within a year by a primary care provider.  If your systolic blood pressure (the top number) is 140 or greater or your diastolic blood pressure (the bottom number) is 90 or greater, you may have high blood pressure. High blood pressure is treatable, but if left untreated over time it can put you at risk for heart attack, stroke, or kidney failure. You should have your blood pressure rechecked by a primary care provider within the next 4 weeks.  If your provider in the emergency department today gave you specific instructions to follow-up with your doctor or provider even sooner than that, you should follow that instruction and not wait for up to 4 weeks for your follow-up visit.        Thank you for choosing [facility]       Thank you for choosing [facility] for your care. Our goal is always to provide you with excellent care. Hearing back from our patients is one way we can continue to improve our services. Please take a few minutes to complete the written survey that you may receive in the mail after you visit with us. Thank you!        Care EveryWhere ID     This is your Care EveryWhere ID. This could be used by other organizations to access your [facility] medical records  [id number]        Equal Access to Services     SHOLA WEST : Hadjabier Chauhan, faizan emmanuel, sarai pierce, trisha guerra. So [facility] [phone].    ATENCIÓN: Si habla español, tiene a veloz disposición servicios gratuitos de asistencia lingüística. Llame al [phone].    We comply with applicable federal civil rights laws and [place] laws. We do not discriminate on the basis of race, color, national origin, age, disability, sex, sexual orientation, or gender identity.            After Visit Summary       This is your record. Keep this with you and show to your community pharmacist(s) and doctor(s) at your next visit.

## 2018-09-29 NOTE — DISCHARGE INSTRUCTIONS
Discharge Instructions  Palpitations    Palpitations are an unusual awareness of your heartbeat. People often describe this as the heart skipping, fluttering, racing, irregular, or pounding. At this time, your provider has found no signs that your palpitations are due to a serious or life-threatening condition. However, sometimes there is a serious problem that does not show up right away.    Palpitations can be caused by caffeine, cigarettes, diet pills, energy drinks or supplements, other stimulants, and medications and street drugs. They can also be caused by anxiety, hormone conditions such as high thyroid, and other medical conditions. Sometimes they are a sign of abnormal rhythm in the heart. At this time, your provider did not find any dangerous cause of your symptoms.    Generally, every Emergency Department visit should have a follow-up clinic visit with either a primary or a specialty clinic/provider. Please follow-up as instructed by your emergency provider today.    Return to the Emergency Department if:    You get chest pain or tightness.    You are short of breath.    You get very weak or tired.    You pass out or faint.    Your heart rate is over 120 beats per minute for more than 10 minutes while you are resting.     You have anything else that worries you.    What can I do to help myself?    Fill any prescriptions the provider gave you and take them right away.     Follow your provider s instructions about the prescription medicines you are on. Sometimes the provider may tell you to stop taking a medicine or change the dose.    If you smoke, this may be a good time to quit! The less you can smoke, the better.    Do not use energy drinks, diet pills, or stimulants. Limit your use of caffeine.  If you were given a prescription for medicine here today, be sure to read all of the information (including the package insert) that comes with your prescription.  This will include important information about  the medicine, its side effects, and any warnings that you need to know about.  The pharmacist who fills the prescription can provide more information and answer questions you may have about the medicine.  If you have questions or concerns that the pharmacist cannot address, please call or return to the Emergency Department.     Remember that you can always come back to the Emergency Department if you are not able to see your regular provider in the amount of time listed above, if you get any new symptoms, or if there is anything that worries you.

## 2018-09-29 NOTE — ED TRIAGE NOTES
"Pt smoked hookah, feeling anxious. Sister and fiance at bedside. Pt states that she went to a friends house and smoked a hookah for the first time and did not feel normal afterwards. Pt stated, \"I feel like I'm going to die tonight.\" Pt currently A&Ox4. ABCDs intact.  "

## 2018-09-29 NOTE — ED PROVIDER NOTES
"  History     Chief Complaint:  Shortness of breath, palpitations    HPI   Ethel Chi is a 33 year old female who presents with shortness of breath and palpitations. The patient states that earlier tonight she smoked hookah which contained marijuana and initially felt fine, but she then had an argument with her boyfriend and started to experience shortness of breath, palpitations, dizziness, leg numbness. Her chest felt like it was \"pounding\" but no pain. She denies any suicidal ideation, feeling unsafe around her partner, or abdominal pain. Her relative accompanying her to the ED reports that she believes that the patient is stressed about her argument with her partner and all of her symptoms are secondary to anxiety. The patient believes she may be pregnant. She denies SI, assault, fear for her own safety or safety of others.     Allergies:  No known allergies     Medications:    Vitamins  Junel Fe    Past Medical History:    Depression  Iron/Vitamin deficiency    Past Surgical History:    cholecystectomy     Family History:    Hypertension    Social History:  Patient presents with family  Negative for alcohol use.  Marital Status:  Single      Review of Systems   Respiratory: Positive for shortness of breath.    Cardiovascular: Positive for palpitations. Negative for chest pain.   Gastrointestinal: Negative for abdominal pain.   Neurological: Positive for dizziness and numbness.   Psychiatric/Behavioral: Negative for suicidal ideas. The patient is nervous/anxious.    All other systems reviewed and are negative.      Physical Exam     Patient Vitals for the past 24 hrs:   BP Temp Temp src Pulse Heart Rate Resp SpO2 Height Weight   09/29/18 0456 - - - - - 16 - - -   09/29/18 0452 115/65 - - - - - 98 % - -   09/29/18 0451 - - - - - - 100 % - -   09/29/18 0438 - - - - - - 100 % - -   09/29/18 0437 - - - - - - 100 % - -   09/29/18 0436 - - - - - - 100 % - -   09/29/18 0435 - - - - - - 100 % - -   09/29/18 0434 - - " "- - - - 100 % - -   09/29/18 0433 - - - - - - 100 % - -   09/29/18 0432 - - - - - - 100 % - -   09/29/18 0431 - - - - - - 100 % - -   09/29/18 0430 - - - - - - 100 % - -   09/29/18 0315 - - - - - - 99 % - -   09/29/18 0314 (!) 146/93 97.6  F (36.4  C) Oral 63 63 16 98 % 1.676 m (5' 6\") 76.2 kg (168 lb)         Physical Exam  General: Adult female sitting upright  Eyes: PERRL, Conjunctive within normal limits  ENT: Moist mucous membranes, oropharynx clear.   Neck: No palpable thyromegaly  CV: Normal S1S2, no murmur, rub or gallop. Regular rate and rhythm  Resp: Clear to auscultation bilaterally, no wheezes, rales or rhonchi. Normal respiratory effort.  GI: Abdomen is soft, nontender and nondistended. No palpable masses. No rebound or guarding.  MSK: No edema. Nontender. Normal active range of motion.  Skin: Warm and dry. No rashes or lesions or ecchymoses on visible skin.  Neuro: Alert and oriented. Responds appropriately to all questions and commands. No focal findings appreciated. Normal muscle tone.  Psych: Anxious appearing      Emergency Department Course   ECG:  Time: 0328  Vent. Rate 67 bpm. OR interval 134. QRS duration 76. QT/QTc 446/471. P-R-T axis 38 36 30. Normal sinus rhythm. Normal ECG. Read time: 0330    Laboratory:  HCG: <5  CBC: WBC: 10.3, HGB: 16.1 (H), PLT: 348  BMP: Glucose 120 (H), o/w WNL (Creatinine: 0.70)  TSH: 2.22    Interventions:  0348 - NS 1L IV  0449 - Ativan 1 mg IV    Emergency Department Course:  Nursing notes and vitals reviewed.  0332: I performed an exam of the patient as documented above. During exam she begins to hyperventilate but is calmed by voice.     0444: I rechecked the patient. She is feeling improved. Findings and plan explained to the Patient. Patient discharged home with instructions regarding supportive care, medications, and reasons to return. The importance of close follow-up was reviewed.     Impression & Plan    Medical Decision Making:  Ethel Chi is a 33 year " old female who presents to the emergency department with concerns for anxiety, shortness of breath, and palpitations in the setting of hookah smoking and then having an argument with her significant other. Here in the emergency department, she has no evidence of dysrhythmia on EKG. Clinical findings on examination are normal. Laboratory evaluation including electrolytes and TSH testing are normal. She's not currently pregnant, which she was told she might be and seemed to also be a cause for concern to her. She's here with a cousin and her significant other, and seems to have appropriate interactions with them. She was denying SI/HI or fear for safety. At this time, there's no apparent life threatening pathology. I suspect more stressor response than dysrhythmia underlying cardiac cause. I discussed the plan with her, which includes avoiding hookah and marijuana and following up with her PCP within 3 days. She's to return immediately if worsening. All of her questions are answered prior to discharge.     Diagnosis:    ICD-10-CM    1. Palpitations R00.2    2. Situational stress F43.9        Disposition:  discharged to home      IAlexx, am serving as a scribe on 9/29/2018 at 3:32 AM to personally document services performed by Asia Ruiz MD based on my observations and the provider's statements to me.       Alexx [place]  9/29/2018   [facility] EMERGENCY DEPARTMENT       Asia Ruiz MD  09/29/18 [id number]

## 2018-10-24 ENCOUNTER — OFFICE VISIT (OUTPATIENT)
Dept: INTERNAL MEDICINE | Facility: CLINIC | Age: 33
End: 2018-10-24
Payer: COMMERCIAL

## 2018-10-24 VITALS
SYSTOLIC BLOOD PRESSURE: 98 MMHG | DIASTOLIC BLOOD PRESSURE: 68 MMHG | HEART RATE: 54 BPM | TEMPERATURE: 98.6 F | BODY MASS INDEX: 29.59 KG/M2 | WEIGHT: 183.3 LBS | RESPIRATION RATE: 16 BRPM | OXYGEN SATURATION: 99 %

## 2018-10-24 DIAGNOSIS — N89.8 VAGINAL IRRITATION: ICD-10-CM

## 2018-10-24 DIAGNOSIS — M62.838 MUSCLE SPASM: ICD-10-CM

## 2018-10-24 DIAGNOSIS — R39.9 LOWER URINARY TRACT SYMPTOMS (LUTS): Primary | ICD-10-CM

## 2018-10-24 DIAGNOSIS — F43.0 ACUTE REACTION TO STRESS: ICD-10-CM

## 2018-10-24 LAB
ALBUMIN UR-MCNC: NEGATIVE MG/DL
APPEARANCE UR: CLEAR
BACTERIA #/AREA URNS HPF: ABNORMAL /HPF
BILIRUB UR QL STRIP: NEGATIVE
COLOR UR AUTO: YELLOW
GLUCOSE UR STRIP-MCNC: NEGATIVE MG/DL
HGB UR QL STRIP: ABNORMAL
KETONES UR STRIP-MCNC: ABNORMAL MG/DL
LEUKOCYTE ESTERASE UR QL STRIP: NEGATIVE
MUCOUS THREADS #/AREA URNS LPF: PRESENT /LPF
NITRATE UR QL: NEGATIVE
NON-SQ EPI CELLS #/AREA URNS LPF: ABNORMAL /LPF
PH UR STRIP: 5.5 PH (ref 5–7)
RBC #/AREA URNS AUTO: ABNORMAL /HPF
SOURCE: ABNORMAL
SP GR UR STRIP: >1.03 (ref 1–1.03)
SPECIMEN SOURCE: NORMAL
UROBILINOGEN UR STRIP-ACNC: 0.2 EU/DL (ref 0.2–1)
WBC #/AREA URNS AUTO: ABNORMAL /HPF
WET PREP SPEC: NORMAL

## 2018-10-24 PROCEDURE — 87591 N.GONORRHOEAE DNA AMP PROB: CPT | Performed by: INTERNAL MEDICINE

## 2018-10-24 PROCEDURE — 99213 OFFICE O/P EST LOW 20 MIN: CPT | Performed by: INTERNAL MEDICINE

## 2018-10-24 PROCEDURE — 87491 CHLMYD TRACH DNA AMP PROBE: CPT | Performed by: INTERNAL MEDICINE

## 2018-10-24 PROCEDURE — 87210 SMEAR WET MOUNT SALINE/INK: CPT | Performed by: INTERNAL MEDICINE

## 2018-10-24 PROCEDURE — 81001 URINALYSIS AUTO W/SCOPE: CPT | Performed by: INTERNAL MEDICINE

## 2018-10-24 PROCEDURE — 87086 URINE CULTURE/COLONY COUNT: CPT | Performed by: INTERNAL MEDICINE

## 2018-10-24 RX ORDER — FLUCONAZOLE 150 MG/1
150 TABLET ORAL ONCE
Qty: 1 TABLET | Refills: 0 | Status: SHIPPED | OUTPATIENT
Start: 2018-10-24 | End: 2018-10-24

## 2018-10-24 RX ORDER — CYCLOBENZAPRINE HCL 5 MG
5 TABLET ORAL 3 TIMES DAILY PRN
Qty: 42 TABLET | Refills: 0 | Status: SHIPPED | OUTPATIENT
Start: 2018-10-24 | End: 2019-08-01

## 2018-10-24 NOTE — MR AVS SNAPSHOT
After Visit Summary   10/24/2018    Ethel Chi    MRN: [id number]           Patient Information     Date Of Birth          1985        Visit Information        Provider Department      10/24/2018 2:30 PM Estela Hernandez MD [facility]        Today's Diagnoses     Lower urinary tract symptoms (LUTS)    -  1    Acute reaction to stress        Screen for STD (sexually transmitted disease)        Vaginal irritation          Care Instructions    Suspect yeast infection.     Diflucan x 1 dose.    ---    START Zoloft 50mg daily.    ---    We will send swabs for further testing.    ---    Will send urine for culture.           Follow-ups after your visit        Who to contact     If you have questions or need follow up information about today's clinic visit or your schedule please contact [facility] directly at [phone].  Normal or non-critical lab and imaging results will be communicated to you by MyChart, letter or phone within 4 business days after the clinic has received the results. If you do not hear from us within 7 days, please contact the clinic through MyChart or phone. If you have a critical or abnormal lab result, we will notify you by phone as soon as possible.  Submit refill requests through [facility] or call your pharmacy and they will forward the refill request to us. Please allow 3 business days for your refill to be completed.          Additional Information About Your Visit        Care EveryWhere ID     This is your Care EveryWhere ID. This could be used by other organizations to access your [facility] medical records  [id number]        Your Vitals Were     Pulse Temperature Respirations Pulse Oximetry BMI (Body Mass Index)       54 98.6  F (37  C) (Oral) 16 99% 29.59 kg/m2        Blood Pressure from Last 3 Encounters:   10/24/18 98/68   09/29/18 115/65   09/09/18 102/74    Weight from Last 3 Encounters:   10/24/18 183 lb 4.8 oz  (83.1 kg)   09/29/18 168 lb (76.2 kg)   09/09/18 178 lb (80.7 kg)              We Performed the Following     CHLAMYDIA TRACHOMATIS PCR     NEISSERIA GONORRHOEA PCR     UA reflex to Microscopic and Culture     Urine Culture Aerobic Bacterial     Urine Microscopic     Wet prep          Today's Medication Changes          These changes are accurate as of 10/24/18  3:08 PM.  If you have any questions, ask your nurse or doctor.               Start taking these medicines.        Dose/Directions    fluconazole 150 MG tablet   Commonly known as:  DIFLUCAN   Used for:  Vaginal irritation   Started by:  Estela Hernandez MD        Dose:  150 mg   Take 1 tablet (150 mg) by mouth once for 1 dose   Quantity:  1 tablet   Refills:  0       sertraline 50 MG tablet   Commonly known as:  ZOLOFT   Used for:  Acute reaction to stress   Started by:  Estela Hernandez MD        Dose:  50 mg   Take 1 tablet (50 mg) by mouth daily   Quantity:  90 tablet   Refills:  0            Where to get your medicines      These medications were sent to [place] [address]     Phone:  [phone]     fluconazole 150 MG tablet    sertraline 50 MG tablet                Primary Care Provider Office Phone # Fax #    [facility] [phone]       [address] [place] [address]        Equal Access to Services     SHOLA WEST AH: Sergei salazar hadasho Soomaali, waaxda luqadaha, qaybta kaalmada adeegyada, trisha guerra. So [facility] [phone].    ATENCIÓN: Si habla español, tiene a veloz disposición servicios gratuitos de asistencia lingüística. Llame al [phone].    We comply with applicable federal civil rights laws and [place] laws. We do not discriminate on the basis of race, color, national origin, age, disability, sex, sexual orientation, or gender identity.            Thank you!     Thank you for  choosing [facility]  for your care. Our goal is always to provide you with excellent care. Hearing back from our patients is one way we can continue to improve our services. Please take a few minutes to complete the written survey that you may receive in the mail after your visit with us. Thank you!             Your Updated Medication List - Protect others around you: Learn how to safely use, store and throw away your medicines at www.disposemymeds.org.          This list is accurate as of 10/24/18  3:08 PM.  Always use your most recent med list.                   Brand Name Dispense Instructions for use Diagnosis    fluconazole 150 MG tablet    DIFLUCAN    1 tablet    Take 1 tablet (150 mg) by mouth once for 1 dose    Vaginal irritation       Multi-vitamin Tabs tablet      Take 1 tablet by mouth daily        norethindrone-ethinyl estradiol 1-20 MG-MCG per tablet    JUNEL FE 1/20    84 tablet    Take 1 tablet by mouth daily    Encounter for refill of prescription for contraception       sertraline 50 MG tablet    ZOLOFT    90 tablet    Take 1 tablet (50 mg) by mouth daily    Acute reaction to stress

## 2018-10-24 NOTE — PATIENT INSTRUCTIONS
Suspect yeast infection.     Diflucan x 1 dose.    ---    START Zoloft 50mg daily.    ---    We will send swabs for further testing.    ---    Will send urine for culture.

## 2018-10-24 NOTE — PROGRESS NOTES
SUBJECTIVE:                                                      HPI: Ethel Chi is a pleasant 33 year old female who presents with urinary symptoms:    - ongoing for ~4 days  - symptoms include urinary frequency, urgency, and dysuria  - associated subjective fevers, but no chills or sweats  - associated vulvar irritation    - no hematuria  - no abnormal vaginal discharge  - no pelvic, abdominal, or flank pain  - no nausea or vomiting    Patient becomes tearful. Admits that she just found out that her fiancee was cheating on her last week. They have broken up and she is having a hard time moving on. She is feeling very emotional, distressed, and irritable. Crying a lot. Having trouble sleeping. No SI or HI. She is interested in medication to help her get through this difficult time. She is also worried that her ex may have passed on an STD to her. Would like to be tested for vaginal/pelvic infections. Declines blood screens (HIV, Hep B & C, and syphilis) at this time.     Unrelated to above, requests refill of Flexeril - which she uses infrequently as needed for muscle spasms.     The medication, allergy, and problem lists have been reviewed and updated as appropriate.       OBJECTIVE:                                                      BP 98/68  Pulse 54  Temp 98.6  F (37  C) (Oral)  Resp 16  Wt 183 lb 4.8 oz (83.1 kg)  SpO2 99%  BMI 29.59 kg/m2  Constitutional: well-appearing  Genitourinary: significant vulvar erythema with scant, thick, white discharge  Psych: normal judgment and insight; normal mood and affect; recent and remote memory intact    UA: not suggestive of infection.     ASSESSMENT/PLAN:                                                      (R39.9) Lower urinary tract symptoms (LUTS)  (primary encounter diagnosis)  Comment: UA not suggestive of UTI.   Plan: culture pending.     (N89.8) Vaginal irritation  Comment:    - significant vulvar erythema with scant, thick, white discharge.   -  suspect yeast infection but will evaluate for other infections.   Plan:    - fluconazole 150mg x 1 dose for suspected yeast infection.   - wet prep and GC/C swabs obtained.     (F43.0) Acute reaction to stress  Comment: recently found out janene was cheating on her; they broke up.   Plan: TRIAL of Zoloft 50mg daily x 2-3 months.     (M62.838) Muscle spasm  Plan: refill of Flexeril provided.     The instructions on the AVS were discussed and explained to the patient. Patient expressed understanding of instructions.    (Chart documentation was completed, in part, with i[employer] voice-recognition software. Even though reviewed, some grammatical, spelling, and word errors may remain.)    Estela Hernandez MD   [address] [address]  T: [phone], F: [phone]

## 2018-10-25 LAB
C TRACH DNA SPEC QL NAA+PROBE: NEGATIVE
N GONORRHOEA DNA SPEC QL NAA+PROBE: NEGATIVE
SPECIMEN SOURCE: NORMAL
SPECIMEN SOURCE: NORMAL

## 2018-10-29 LAB
BACTERIA SPEC CULT: NORMAL
SPECIMEN SOURCE: NORMAL

## 2018-11-02 ENCOUNTER — TELEPHONE (OUTPATIENT)
Dept: OBGYN | Facility: CLINIC | Age: 33
End: 2018-11-02

## 2018-11-02 NOTE — TELEPHONE ENCOUNTER
Sent reminder letter to pt about pap smear due along with follow up to birth control (due in August)

## 2018-11-02 NOTE — LETTER
[address] [address]  [phone]        November 2, 2018    Ethel Chi  [address] [place] [address]              Dear Ethel Chi    This is to remind you that your PAP Smear is overdue, last pap smear that we have on file for you is from 2010. You are also due for follow up to birth control with Carissa Benitez or Edie Gomez.    You may call our office at [phone] to schedule an appointment as soon as possible for both.          Sincerely,        Carissa Benitez RN, CNP

## 2019-01-25 ENCOUNTER — TELEPHONE (OUTPATIENT)
Dept: OBGYN | Facility: CLINIC | Age: 34
End: 2019-01-25

## 2019-01-25 NOTE — TELEPHONE ENCOUNTER
Panel Management Review    Date of last visit with a [facility] provider: Carissa Benitez on 5/30/2018.  Date of next visit with a [facility] provider: None.    Health Maintenance List    Health Maintenance   Topic Date Due     PHQ-9 Q6 MONTHS  04/15/2013     PAP Q3 YR  09/17/2013     LIPID SCREENING Q5 YEARS  01/17/2017     DTAP/TDAP/TD IMMUNIZATION (2 - Td) 06/12/2019     CMP Q1 YR  08/16/2019     TSH Q2 YEAR  09/29/2020     ZOSTER IMMUNIZATION (1 of 2) 02/06/2035     DEPRESSION ACTION PLAN  Completed     HIV SCREEN (SYSTEM ASSIGNED)  Completed     INFLUENZA VACCINE  Addressed     IPV IMMUNIZATION  Aged Out     MENINGITIS IMMUNIZATION  Aged Out       Composite cancer screening  Chart review shows that this patient is due/due soon for the following Pap Smear  Lab Results   Component Value Date    PAP NIL 09/17/2010     Past Surgical History:   Procedure Laterality Date     LAPAROSCOPIC CHOLECYSTECTOMY  2003       Is hysterectomy listed in surgical history? No   Is mastectomy listed in surgical history? No     Summary:    Patient is due/failing the following:   Pap Smear    Action needed: Patient needs office visit for annual exam and pap smear.    Type of outreach:  Sent letter.      Staff Signature:  Brenda Barry CMA on 1/25/2019 at 4:18 PM

## 2019-01-25 NOTE — LETTER
May 3, 2019      Ethel Chi  [address] [address]        Dear Ethel,     You were seen in our office on 5/30/2018 . We are reaching out to you because we do not have record of a pap test in the [facility] system. We track this to make sure you are getting proper screening for cervical and breast cancers.     If you had this at another clinic outside of [facility], we ask that you call back to relay this information; where you had this test, approximate date and the result.     Otherwise we ask that you please make an appointment with our clinic for an exam, or let us know if you will be seeing another clinic for our tracking purposes.          Sincerely,        ROSEMARY Dunn CNM

## 2019-01-25 NOTE — LETTER
April 2, 2019      Ethel Chi  [address] [address]        Dear Ethel,     You were seen in our office on 5/30/2018 . We are reaching out to you because we do not have record of a pap test in the [facility] system. We track this to make sure you are getting proper screening for cervical and breast cancers.     If you had this at another clinic outside of [facility], we ask that you call back to relay this information; where you had this test, approximate date and the result.     Otherwise we ask that you please make an appointment with our clinic for an exam, or let us know if you will be seeing another clinic for our tracking purposes.          Sincerely,        ROSEMARY Dunn CNM

## 2019-01-25 NOTE — LETTER
[address] [address]  [phone]        January 25, 2019    Ethel Chi  [address] [place] [address]              Dear Ethel Chi    This is to remind you that your PAP Smear and annual exam are due.    You may call our office at [phone] to schedule an appointment.    Please disregard this notice if you have already had your labs drawn or made an appointment.        Sincerely,        Carissa Benitez CNM

## 2019-06-11 ENCOUNTER — NURSE TRIAGE (OUTPATIENT)
Dept: INTERNAL MEDICINE | Facility: CLINIC | Age: 34
End: 2019-06-11

## 2019-06-11 NOTE — TELEPHONE ENCOUNTER
"    Additional Information    Negative: Severe difficulty breathing (e.g., struggling for each breath, speaks in single words)    Negative: Passed out (i.e., fainted, collapsed and was not responding)    Negative: Chest pain lasting longer than 5 minutes and ANY of the following:* Over 50 years old* Over 30 years old and at least one cardiac risk factor (i.e., high blood pressure, diabetes, high cholesterol, obesity, smoker or strong family history of heart disease)* Pain is crushing, pressure-like, or heavy * Took nitroglycerin and chest pain was not relieved* History of heart disease (i.e., angina, heart attack, bypass surgery, angioplasty, CHF)    Negative: Visible sweat on face or sweat dripping down face    Negative: Sounds like a life-threatening emergency to the triager    Negative: Followed an injury to chest    Negative: SEVERE chest pain    Negative: Pain also present in shoulder(s) or arm(s) or jaw    Negative: Difficulty breathing    Negative: Cocaine use within last 3 days    Negative: History of prior 'blood clot' in leg or lungs (i.e., deep vein thrombosis, pulmonary embolism)    Negative: Recent illness requiring prolonged bed rest (i.e., immobilization)    Negative: Hip or leg fracture in past 2 months (e.g, or had cast on leg or ankle)    Negative: Major surgery in the past month    Negative: Recent long-distance travel with prolonged time in car, bus, plane, or train (i.e., within past 2 weeks; 6 or more hours duration)    Negative: Heart beating irregularly or very rapidly    Chest pain lasting longer than 5 minutes    Answer Assessment - Initial Assessment Questions  1. LOCATION: \"Where does it hurt?\"        Hurts usually a lot, especially today when she was in a meeting and got up to get coffee, she drank the coffee, and then that is when it started. Located in the Middle.  2. RADIATION: \"Does the pain go anywhere else?\" (e.g., into neck, jaw, arms, back)      Throat  3. ONSET: \"When did the " "chest pain begin?\" (Minutes, hours or days)       Today when she drank coffee. She says that coffee seems to trigger, constant pain, felt weak and lightheaded. After that episode she gradually felt better as the day went on.  4. PATTERN \"Does the pain come and go, or has it been constant since it started?\"  \"Does it get worse with exertion?\"       It is still there but not as bad.   5. DURATION: \"How long does it last\" (e.g., seconds, minutes, hours)      minutes  6. SEVERITY: \"How bad is the pain?\"  (e.g., Scale 1-10; mild, moderate, or severe)     - MILD (1-3): doesn't interfere with normal activities      - MODERATE (4-7): interferes with normal activities or awakens from sleep     - SEVERE (8-10): excruciating pain, unable to do any normal activities        5  7. CARDIAC RISK FACTORS: \"Do you have any history of heart problems or risk factors for heart disease?\" (e.g., prior heart attack, angina; high blood pressure, diabetes, being overweight, high cholesterol, smoking, or strong family history of heart disease)      no  8. PULMONARY RISK FACTORS: \"Do you have any history of lung disease?\"  (e.g., blood clots in lung, asthma, emphysema, birth control pills)      no  9. CAUSE: \"What do you think is causing the chest pain?\"      Coffee, maybe heartburn?  10. OTHER SYMPTOMS: \"Do you have any other symptoms?\" (e.g., dizziness, nausea, vomiting, sweating, fever, difficulty breathing, cough)        [place], dizzy, (no chest tightness or pressure)  11. PREGNANCY: \"Is there any chance you are pregnant?\" \"When was your last menstrual period?\"        no    Protocols used: CHEST PAIN-A-OH      "

## 2019-07-27 ENCOUNTER — TELEPHONE (OUTPATIENT)
Dept: OBGYN | Facility: CLINIC | Age: 34
End: 2019-07-27

## 2019-07-27 NOTE — TELEPHONE ENCOUNTER
Patient requesting refill of ibuprofen 600 mg      Thank you,  Rose Rviera, [facility]

## 2019-07-29 RX ORDER — IBUPROFEN 600 MG/1
600 TABLET, FILM COATED ORAL EVERY 6 HOURS PRN
Status: CANCELLED | OUTPATIENT
Start: 2019-07-29

## 2019-07-29 NOTE — TELEPHONE ENCOUNTER
Pt requesting rx for ibuprofen- Has not been seen in this clinic since may 2018-  Will refer the pt to contact her PCP for rx.  Tried contacting the pt via her phone number- The phone is not accepting any calls at this time.    Pharmacy contacted and they will contact her PCP for the RX

## 2019-07-30 ENCOUNTER — TELEPHONE (OUTPATIENT)
Dept: INTERNAL MEDICINE | Facility: CLINIC | Age: 34
End: 2019-07-30

## 2019-07-30 NOTE — TELEPHONE ENCOUNTER
Attempted to call patient but no answer and no voicemail. Rings and rings but also get message at the beginning of call that stated number cannot take calls at this time. Refill request comments say refill request for 600 mg ibuprofen. Not on current med list. Patient has appt with PCP 8/1. Should this be addressed then? There is no pharmacy selected but looks like refill request originally came from [facility] pharmacy- see 7/27 TE.

## 2019-08-01 ENCOUNTER — OFFICE VISIT (OUTPATIENT)
Dept: INTERNAL MEDICINE | Facility: CLINIC | Age: 34
End: 2019-08-01
Payer: COMMERCIAL

## 2019-08-01 VITALS
WEIGHT: 191.5 LBS | DIASTOLIC BLOOD PRESSURE: 66 MMHG | SYSTOLIC BLOOD PRESSURE: 100 MMHG | RESPIRATION RATE: 16 BRPM | HEART RATE: 57 BPM | BODY MASS INDEX: 30.91 KG/M2 | OXYGEN SATURATION: 98 %

## 2019-08-01 DIAGNOSIS — Z13.0 SCREENING FOR BLOOD DISEASE: ICD-10-CM

## 2019-08-01 DIAGNOSIS — R39.9 LOWER URINARY TRACT SYMPTOMS (LUTS): ICD-10-CM

## 2019-08-01 DIAGNOSIS — Z12.4 SCREENING FOR CERVICAL CANCER: ICD-10-CM

## 2019-08-01 DIAGNOSIS — Z13.29 SCREENING FOR THYROID DISORDER: ICD-10-CM

## 2019-08-01 DIAGNOSIS — R52 MILD PAIN: ICD-10-CM

## 2019-08-01 DIAGNOSIS — Z13.220 SCREENING FOR CHOLESTEROL LEVEL: ICD-10-CM

## 2019-08-01 DIAGNOSIS — Z00.01 ENCOUNTER FOR ROUTINE ADULT MEDICAL EXAM WITH ABNORMAL FINDINGS: Primary | ICD-10-CM

## 2019-08-01 DIAGNOSIS — E28.319 EARLY ONSET MENOPAUSE: ICD-10-CM

## 2019-08-01 DIAGNOSIS — M62.838 MUSCLE SPASM: ICD-10-CM

## 2019-08-01 DIAGNOSIS — Z23 NEED FOR VACCINATION: ICD-10-CM

## 2019-08-01 DIAGNOSIS — Z13.1 SCREENING FOR DIABETES MELLITUS: ICD-10-CM

## 2019-08-01 DIAGNOSIS — Z13.21 ENCOUNTER FOR VITAMIN DEFICIENCY SCREENING: ICD-10-CM

## 2019-08-01 LAB
ALBUMIN UR-MCNC: NEGATIVE MG/DL
APPEARANCE UR: CLEAR
BACTERIA #/AREA URNS HPF: ABNORMAL /HPF
BILIRUB UR QL STRIP: NEGATIVE
COLOR UR AUTO: YELLOW
GLUCOSE UR STRIP-MCNC: NEGATIVE MG/DL
HGB UR QL STRIP: ABNORMAL
KETONES UR STRIP-MCNC: NEGATIVE MG/DL
LEUKOCYTE ESTERASE UR QL STRIP: NEGATIVE
MUCOUS THREADS #/AREA URNS LPF: PRESENT /LPF
NITRATE UR QL: NEGATIVE
NON-SQ EPI CELLS #/AREA URNS LPF: ABNORMAL /LPF
PH UR STRIP: 5.5 PH (ref 5–7)
RBC #/AREA URNS AUTO: ABNORMAL /HPF
SOURCE: ABNORMAL
SP GR UR STRIP: 1.02 (ref 1–1.03)
UROBILINOGEN UR STRIP-ACNC: 0.2 EU/DL (ref 0.2–1)
WBC #/AREA URNS AUTO: ABNORMAL /HPF

## 2019-08-01 PROCEDURE — 81001 URINALYSIS AUTO W/SCOPE: CPT | Performed by: INTERNAL MEDICINE

## 2019-08-01 PROCEDURE — 99214 OFFICE O/P EST MOD 30 MIN: CPT | Mod: 25 | Performed by: INTERNAL MEDICINE

## 2019-08-01 PROCEDURE — 90714 TD VACC NO PRESV 7 YRS+ IM: CPT | Performed by: INTERNAL MEDICINE

## 2019-08-01 PROCEDURE — 99395 PREV VISIT EST AGE 18-39: CPT | Mod: 25 | Performed by: INTERNAL MEDICINE

## 2019-08-01 PROCEDURE — G0476 HPV COMBO ASSAY CA SCREEN: HCPCS | Performed by: INTERNAL MEDICINE

## 2019-08-01 PROCEDURE — G0124 SCREEN C/V THIN LAYER BY MD: HCPCS | Performed by: INTERNAL MEDICINE

## 2019-08-01 PROCEDURE — G0145 SCR C/V CYTO,THINLAYER,RESCR: HCPCS | Performed by: INTERNAL MEDICINE

## 2019-08-01 PROCEDURE — 90471 IMMUNIZATION ADMIN: CPT | Performed by: INTERNAL MEDICINE

## 2019-08-01 RX ORDER — IBUPROFEN 600 MG/1
600 TABLET, FILM COATED ORAL EVERY 6 HOURS PRN
Qty: 20 TABLET | Refills: 0 | Status: SHIPPED | OUTPATIENT
Start: 2019-08-01 | End: 2019-10-25

## 2019-08-01 RX ORDER — NORETHINDRONE ACETATE AND ETHINYL ESTRADIOL 1MG-20(21)
1 KIT ORAL DAILY
Qty: 84 TABLET | Refills: 3 | Status: SHIPPED | OUTPATIENT
Start: 2019-08-01 | End: 2020-09-15

## 2019-08-01 RX ORDER — CYCLOBENZAPRINE HCL 5 MG
5 TABLET ORAL DAILY PRN
Qty: 30 TABLET | Refills: 0 | Status: SHIPPED | OUTPATIENT
Start: 2019-08-01 | End: 2020-04-14

## 2019-08-01 NOTE — PROGRESS NOTES
SUBJECTIVE                                                      HPI: Ethel Chi is a pleasant 34 year old female who presents for a physical.    Accompanied by daughter who helps translate.    Complains of urinary symptoms:  - ongoing chronically - for months  - urinary frequency, urgency, and dysuria; no hematuria    - no pelvic, abdominal, or flank pain  - no nausea or vomiting  - no fevers or chills    Patient also needs refills of ibuprofen and Flexeril - uses both infrequently for pain relief and muscle spasms.    Patient also needs refills of OCP due to early onset menopause.     ROS:  Constitutional: denies unintentional weight loss or gain; denies fevers, chills, or sweats     Cardiovascular: denies chest pain, palpitations, or edema  Respiratory: denies cough, wheezing, shortness of breath, or dyspnea on exertion  Gastrointestinal: denies nausea, vomiting, constipation, diarrhea, or abdominal pain  Genitourinary: see above  Integumentary: denies rash or pruritus  Musculoskeletal: denies back pain, muscle pain, joint pain, or joint swelling  Neurologic: denies focal weakness, numbness, or tingling  Hematologic/Immunologic: denies history of anemia or blood transfusions  Endocrine: denies heat or cold intolerance; denies polyuria, polydipsia  Psychiatric: denies anxiety; see preventative health below    Past Medical History:   Diagnosis Date     Early onset menopause      Obesity (BMI 30-39.9)      Past Surgical History:   Procedure Laterality Date     LAPAROSCOPIC CHOLECYSTECTOMY  2003     Family History   Problem Relation Age of Onset     Hypertension Mother      Diabetes Type 2  Father      Throat cancer Maternal Uncle      Myocardial Infarction No family hx of      Cerebrovascular Disease No family hx of      Coronary Artery Disease Early Onset No family hx of      Breast Cancer No family hx of      Ovarian Cancer No family hx of      Colon Cancer No family hx of      Social History       Occupation:  Not working currently   Tobacco Use     Smoking status: Never Smoker     Smokeless tobacco: Never Used   Substance and Sexual Activity     Alcohol use: No     Drug use: No     Sexual activity: Not Currently   Social History Narrative    Single.    4 kids.    No formal exercise.     Allergies   Allergen Reactions     No Known Allergies      Current Outpatient Medications   Medication Sig     cyclobenzaprine (FLEXERIL) 5 MG tablet Take 1 tablet (5 mg) by mouth daily as needed for muscle spasms     ibuprofen (ADVIL/MOTRIN) 600 MG tablet Take 1 tablet (600 mg) by mouth every 6 hours as needed for moderate pain     norethindrone-ethinyl estradiol (JUNEL FE 1/20) 1-20 MG-MCG tablet Take 1 tablet by mouth daily     Immunization History   Administered Date(s) Administered     Influenza (IIV3) PF 12/01/2009, 10/25/2011     MMR 04/04/2007     Poliovirus, inactivated (IPV) 02/08/2012     TD (ADULT, 7+) 08/01/2019     TDAP Vaccine (Adacel) 06/12/2009     Twinrix A/B 02/08/2012     Typhoid IM 02/08/2012     Varicella 04/04/2007     Yellow Fever 02/08/2012     OBJECTIVE                                                      /66   Pulse 57   Resp 16   Wt 86.9 kg (191 lb 8 oz)   SpO2 98%   BMI 30.91 kg/m    Constitutional: well-appearing  Eyes: normal conjunctivae and lids; pupils equal, round, and reactive to light  Ears, Nose, Mouth, and Throat: normal ears and nose; tympanic membranes visualized and normal; normal lips, teeth, and gums; no oropharyngeal lesions or ulcers  Neck: supple and symmetric; no lymphadenopathy; no thyromegaly or masses  Respiratory: normal respiratory effort; clear to auscultation bilaterally  Cardiovascular: regular rate and rhythm; pedal pulses palpable; no edema  Gastrointestinal: soft, non-tender, non-distended, and bowel sounds present; no organomegaly or masses  Genitourinary: external genitalia, urethral meatus, and vagina normal; cervix visualized and normal in  appearance  Musculoskeletal: normal gait and station  Psych: normal judgment and insight; normal mood and affect; recent and remote memory intact; oriented to time, place, and person    PREVENTATIVE HEALTH                                                      Blood pressure: within normal limits   Breast CA screening: not medically indicated at this time   Cervical CA screening: DUE  Colon CA screening: not medically indicated at this time   Lung CA screening: n/a   Dexa: not medically indicated at this time   Screening HCV: n/a   Screening HIV: completed  Screening cholesterol: DUE  Screening diabetes: DUE  STD testing: no risk factors present  Depression screening: PHQ-2 assessment completed and reviewed - no intervention indicated at this time  Alcohol misuse screening: alcohol use reviewed - no intervention indicated at this time  Immunizations: reviewed; TD DUE    ASSESSMENT/PLAN                                                       (Z00.01) Encounter for routine adult medical exam with abnormal findings  (primary encounter diagnosis)  Comment: PMH, PSH, FH, SH, medications, allergies, immunizations, and preventative health measures reviewed.   Plan: see below for plans.     (Z12.4) Screening for cervical cancer  Plan: pap smear obtained today; if normal/negative, may repeat in 5 years.     (Z23) Need for vaccination  Plan: TD given today.     (Z13.21) Encounter for vitamin deficiency screening  (Z13.220) Screening for cholesterol level  (Z13.1) Screening for diabetes mellitus  (Z13.0) Screening for blood disease  (Z13.29) Screening for thyroid disorder  Plan: patient will return for fasting labs when able.     (R39.9) Lower urinary tract symptoms (LUTS)  Comment: ongoing chronically.   Plan:    - UA reflex today.   - if negative, can consider trial of anticholinergic for possible OAB.    (R52) Mild pain  Comment: well-controlled with ibuprofen sparingly as needed.   Plan: CPM; refills provided.     (M62.838)  Muscle spasm  Comment: well-controlled with Flexeril sparingly as needed.  Plan: CPM; refills provided.     (E28.319) Early onset menopause  Plan: refills of OCP provided.     The instructions on the AVS were discussed and explained to the patient. Patient expressed understanding of instructions.    (Chart documentation was completed, in part, with [employer] voice-recognition software. Even though reviewed, some grammatical, spelling, and word errors may remain.)    Estela Hernandez MD   [address] [address]  T: [phone], F: [phone]

## 2019-08-01 NOTE — PATIENT INSTRUCTIONS
Tetanus booster today.    Urine sample today.    ---    Please schedule fasting labs on the way out today.    ---    Pap smear results take ~1 week to come back.    ---    Refills of birth control pill, ibuprofen, and Flexeril sent to pharmacy.

## 2019-08-01 NOTE — LETTER
August 8, 2019    Ethel Chi  [address] [facility] [address]      Dear ,      This letter is in regards to your recent cervical cancer screening (Pap smear and HPV test).    Your Pap smear result was reported as ASCUS or Atypical Squamous Cells of Undetermined Significance. This means that there were mildly abnormal cells found in the sample that we collected from your cervix. The vast majority of patients with this result do not have significant cervical abnormalities.     Your cervical sample was also tested for the presence of Human Papillomavirus (HPV). Your HPV test is NEGATIVE for high risk HPV, meaning that no HPV was found at this time.     Over time, your body can get rid of these abnormal cells, so it is recommended that you repeat your Pap smear and HPV test in 3 years.    If you have additional questions regarding this result, please call our registered nurse, Suzy at [phone].    Please continue to be seen every year for an annual physical exam and other preventative tests.         Sincerely,    Estela Hernandez MD/krisyt

## 2019-08-02 DIAGNOSIS — R39.9 LOWER URINARY TRACT SYMPTOMS (LUTS): Primary | ICD-10-CM

## 2019-08-02 RX ORDER — TOLTERODINE 2 MG/1
2 CAPSULE, EXTENDED RELEASE ORAL DAILY
Qty: 90 CAPSULE | Refills: 0 | Status: SHIPPED | OUTPATIENT
Start: 2019-08-02 | End: 2020-04-14

## 2019-08-06 LAB
COPATH REPORT: ABNORMAL
PAP: ABNORMAL

## 2019-08-08 LAB
FINAL DIAGNOSIS: NORMAL
HPV HR 12 DNA CVX QL NAA+PROBE: NEGATIVE
HPV16 DNA SPEC QL NAA+PROBE: NEGATIVE
HPV18 DNA SPEC QL NAA+PROBE: NEGATIVE
SPECIMEN DESCRIPTION: NORMAL
SPECIMEN SOURCE CVX/VAG CYTO: NORMAL

## 2019-10-25 DIAGNOSIS — R52 MILD PAIN: ICD-10-CM

## 2019-10-25 RX ORDER — IBUPROFEN 600 MG/1
TABLET, FILM COATED ORAL
Qty: 20 TABLET | Refills: 0 | Status: SHIPPED | OUTPATIENT
Start: 2019-10-25 | End: 2020-03-04

## 2019-10-25 NOTE — TELEPHONE ENCOUNTER
"Requested Prescriptions   Pending Prescriptions Disp Refills     ibuprofen (ADVIL/MOTRIN) 600 MG tablet [Pharmacy Med Name: IBUPROFEN 600MG TABS] 20 tablet 0     Sig: TAKE ONE TABLET BY MOUTH EVERY 6 HOURS AS NEEDED FOR MODERATE PAIN.       NSAID Medications Failed - 10/25/2019  1:09 PM        Failed - Normal ALT on file in past 12 months     Recent Labs   Lab Test 08/16/18  1204   ALT 19             Failed - Normal AST on file in past 12 months     Recent Labs   Lab Test 08/16/18  1204   AST 12             Failed - Normal CBC on file in past 12 months     Recent Labs   Lab Test 09/29/18  0348   WBC 10.3   RBC 5.32*   HGB 16.1*   HCT 48.8*                    Failed - Normal serum creatinine on file in past 12 months     Recent Labs   Lab Test 09/29/18  0348   CR 0.70             Passed - Blood pressure under 140/90 in past 12 months     BP Readings from Last 3 Encounters:   08/01/19 100/66   10/24/18 98/68   09/29/18 115/65                 Passed - Recent (12 mo) or future (30 days) visit within the authorizing provider's specialty     Patient has had an office visit with the authorizing provider or a provider within the authorizing providers department within the previous 12 mos or has a future within next 30 days. See \"Patient Info\" tab in inbasket, or \"Choose Columns\" in Meds & Orders section of the refill encounter.              Passed - Patient is age 6-64 years        Passed - Medication is active on med list        Passed - No active pregnancy on record        Passed - No positive pregnancy test in past 12 months        Last Written Prescription Date:  8/1/19  Last Fill Quantity: 20,  # refills: 0   Last office visit: 8/1/2019 with prescribing provider:  PCP   Future Office Visit:      "

## 2019-10-25 NOTE — TELEPHONE ENCOUNTER
Routing refill request to provider for review/approval because:  Labs out of range:  CBC  Labs not current:  CBC, creat, ALT, AST

## 2019-12-11 DIAGNOSIS — R52 MILD PAIN: ICD-10-CM

## 2019-12-11 NOTE — LETTER
[facility]  [address] [address]  [phone]            Ethel Chi  [address] [place] [address]        December 24, 2019    Dear Ethel,    After receiving refill request for Ibuprofen, we noticed that you are due to have labs drawn.  We needto have these completed before any refills can be approved.     Taking care of your health is important to us and we look forward to seeing you in the near future.  Please call us at [phone] or [phone] (or use "[employer] to schedule an appointment.     Please disregard this notice if you have already made an appointment.    Sincerely,        [facility]

## 2019-12-11 NOTE — TELEPHONE ENCOUNTER
"Requested Prescriptions   Pending Prescriptions Disp Refills     ibuprofen (ADVIL/MOTRIN) 600 MG tablet [Pharmacy Med Name: IBUPROFEN 600MG TABS]  Last Written Prescription Date:  10/25/2019  Last Fill Quantity: 20,  # refills: 0   Last Office Visit: 8/1/2019   Future Office Visit:      20 tablet 0     Sig: TAKE ONE TABLET BY MOUTH EVERY 6 HOURS AS NEEDED FOR MODERATE PAIN.       NSAID Medications Failed - 12/11/2019  5:33 PM        Failed - Normal ALT on file in past 12 months     Recent Labs   Lab Test 08/16/18  1204   ALT 19             Failed - Normal AST on file in past 12 months     Recent Labs   Lab Test 08/16/18  1204   AST 12             Failed - Normal CBC on file in past 12 months     Recent Labs   Lab Test 09/29/18  0348   WBC 10.3   RBC 5.32*   HGB 16.1*   HCT 48.8*                    Failed - Normal serum creatinine on file in past 12 months     Recent Labs   Lab Test 09/29/18  0348   CR 0.70             Passed - Blood pressure under 140/90 in past 12 months     BP Readings from Last 3 Encounters:   08/01/19 100/66   10/24/18 98/68   09/29/18 115/65                 Passed - Recent (12 mo) or future (30 days) visit within the authorizing provider's specialty     Patient has had an office visit with the authorizing provider or a provider within the authorizing providers department within the previous 12 mos or has a future within next 30 days. See \"Patient Info\" tab in inbasket, or \"Choose Columns\" in Meds & Orders section of the refill encounter.              Passed - Patient is age 6-64 years        Passed - Medication is active on med list        Passed - No active pregnancy on record        Passed - No positive pregnancy test in past 12 months          "

## 2019-12-12 RX ORDER — IBUPROFEN 600 MG/1
TABLET, FILM COATED ORAL
Qty: 20 TABLET | Refills: 0 | OUTPATIENT
Start: 2019-12-12

## 2019-12-13 NOTE — TELEPHONE ENCOUNTER
Called patient and left message to call back. Please give message from provider and help sched apt when patient calls back.

## 2019-12-24 NOTE — TELEPHONE ENCOUNTER
Tried to contact patient using  services. There was no answer and couldn't leave a message.   Would you like us to send a letter/ Please advise. Thanks, Xenia Timmons, HELADIO

## 2020-03-04 ENCOUNTER — OFFICE VISIT (OUTPATIENT)
Dept: INTERNAL MEDICINE | Facility: CLINIC | Age: 35
End: 2020-03-04
Payer: COMMERCIAL

## 2020-03-04 VITALS
RESPIRATION RATE: 16 BRPM | HEART RATE: 66 BPM | TEMPERATURE: 98.2 F | DIASTOLIC BLOOD PRESSURE: 68 MMHG | HEIGHT: 64 IN | WEIGHT: 195.5 LBS | SYSTOLIC BLOOD PRESSURE: 104 MMHG | BODY MASS INDEX: 33.38 KG/M2 | OXYGEN SATURATION: 99 %

## 2020-03-04 DIAGNOSIS — Z23 NEED FOR PROPHYLACTIC VACCINATION AND INOCULATION AGAINST INFLUENZA: ICD-10-CM

## 2020-03-04 DIAGNOSIS — R53.83 FATIGUE, UNSPECIFIED TYPE: ICD-10-CM

## 2020-03-04 DIAGNOSIS — I83.92 ASYMPTOMATIC VARICOSE VEIN OF LEFT LOWER EXTREMITY WITHOUT COMPLICATION: ICD-10-CM

## 2020-03-04 DIAGNOSIS — I83.92 SPIDER VEIN OF LEFT LOWER EXTREMITY: ICD-10-CM

## 2020-03-04 DIAGNOSIS — Z00.00 ROUTINE GENERAL MEDICAL EXAMINATION AT A HEALTH CARE FACILITY: Primary | ICD-10-CM

## 2020-03-04 DIAGNOSIS — Z13.1 SCREENING FOR DIABETES MELLITUS: ICD-10-CM

## 2020-03-04 DIAGNOSIS — R52 MILD PAIN: ICD-10-CM

## 2020-03-04 DIAGNOSIS — Z13.220 SCREENING FOR CHOLESTEROL LEVEL: ICD-10-CM

## 2020-03-04 PROCEDURE — 99395 PREV VISIT EST AGE 18-39: CPT | Mod: 25 | Performed by: INTERNAL MEDICINE

## 2020-03-04 PROCEDURE — 90471 IMMUNIZATION ADMIN: CPT | Performed by: INTERNAL MEDICINE

## 2020-03-04 PROCEDURE — 90686 IIV4 VACC NO PRSV 0.5 ML IM: CPT | Performed by: INTERNAL MEDICINE

## 2020-03-04 RX ORDER — IBUPROFEN 600 MG/1
600 TABLET, FILM COATED ORAL EVERY 8 HOURS PRN
Qty: 20 TABLET | Refills: 1 | Status: SHIPPED | OUTPATIENT
Start: 2020-03-04 | End: 2020-09-15

## 2020-03-04 ASSESSMENT — MIFFLIN-ST. JEOR: SCORE: 1566.78

## 2020-03-04 NOTE — PROGRESS NOTES
SUBJECTIVE                                                      HPI: Ethel Chi is a pleasant 35 year old female who presents for a physical.    Patient complains of generalized fatigue-ongoing for months. Wakes up tired and is tired throughout the day. No energy to exercise or even to perform daily pressures. Denies depression.    Patient would like referral to a vein specialist to discuss her asymptomatic, but cosmetically undesirable varicose and spider veins.    ROS:  Constitutional: denies unintentional weight loss or gain; denies fevers, chills, or sweats     Cardiovascular: denies chest pain, palpitations, or edema  Respiratory: denies cough, wheezing, shortness of breath, or dyspnea on exertion  Gastrointestinal: denies nausea, vomiting, constipation, diarrhea, or abdominal pain  Genitourinary: denies urinary frequency, urgency, dysuria, or hematuria  Integumentary: denies rash or pruritus  Musculoskeletal: denies back pain, muscle pain, joint pain, or joint swelling  Neurologic: denies focal weakness, numbness, or tingling  Hematologic/Immunologic: denies history of anemia or blood transfusions  Endocrine: denies heat or cold intolerance; denies polyuria, polydipsia  Psychiatric: denies anxiety; see preventative health below    Past Medical History:   Diagnosis Date     ASCUS of cervix with negative high risk HPV 8/1/2019 8/1/19 ASCUS pap, Neg HPV.  Plan: cotest in 3 years     Early onset menopause      Obesity (BMI 30-39.9)      Past Surgical History:   Procedure Laterality Date     LAPAROSCOPIC CHOLECYSTECTOMY  2003     Family History   Problem Relation Age of Onset     Hypertension Mother      Diabetes Type 2  Father      Throat cancer Maternal Uncle      Myocardial Infarction No family hx of      Cerebrovascular Disease No family hx of      Coronary Artery Disease Early Onset No family hx of      Breast Cancer No family hx of      Ovarian Cancer No family hx of      Colon Cancer No family hx of   "    Social History     Occupational History     Occupation: Not working currently   Tobacco Use     Smoking status: Never Smoker     Smokeless tobacco: Never Used   Substance and Sexual Activity     Alcohol use: No     Drug use: No     Sexual activity: Not Currently   Social History Narrative    Single.    4 kids.    No formal exercise.     Allergies   Allergen Reactions     No Known Allergies      Current Outpatient Medications   Medication Sig     cyclobenzaprine (FLEXERIL) 5 MG tablet Take 1 tablet (5 mg) by mouth daily as needed for muscle spasms     ibuprofen (ADVIL/MOTRIN) 600 MG tablet TAKE ONE TABLET BY MOUTH EVERY 6 HOURS AS NEEDED FOR MODERATE PAIN.     norethindrone-ethinyl estradiol (JUNEL FE 1/20) 1-20 MG-MCG tablet Take 1 tablet by mouth daily     tolterodine ER (DETROL LA) 2 MG 24 hr capsule Take 1 capsule (2 mg) by mouth daily     Immunization History   Administered Date(s) Administered     Influenza (IIV3) PF 12/01/2009, 10/25/2011     MMR 04/04/2007     Poliovirus, inactivated (IPV) 02/08/2012     TD (ADULT, 7+) 08/01/2019     TDAP Vaccine (Adacel) 06/12/2009     Twinrix A/B 02/08/2012     Typhoid IM 02/08/2012     Varicella 04/04/2007     Yellow Fever 02/08/2012     OBJECTIVE                                                      /68   Pulse 66   Temp 98.2  F (36.8  C) (Oral)   Resp 16   Ht 1.626 m (5' 4\")   Wt 88.7 kg (195 lb 8 oz)   LMP 02/28/2020 (Approximate)   SpO2 99%   BMI 33.56 kg/m    Constitutional: well-appearing  Eyes: normal conjunctivae and lids; pupils equal, round, and reactive to light  Ears, Nose, Mouth, and Throat: normal ears and nose; tympanic membranes visualized and normal; normal lips, teeth, and gums; no oropharyngeal lesions or ulcers  Neck: supple and symmetric; no lymphadenopathy; no thyromegaly or masses  Respiratory: normal respiratory effort; clear to auscultation bilaterally  Cardiovascular: regular rate and rhythm; pedal pulses palpable; no " edema  Gastrointestinal: soft, non-tender, non-distended, and bowel sounds present; no organomegaly or masses  Musculoskeletal: normal gait and station  Psych: normal judgment and insight; normal mood and affect; recent and remote memory intact; oriented to time, place, and person    PREVENTATIVE HEALTH                                                      Blood pressure: within normal limits   Breast CA screening: not medically indicated at this time   Cervical CA screening: up to date   Colon CA screening: not medically indicated at this time   Lung CA screening: n/a   Dexa: not medically indicated at this time   Screening HCV: n/a   Screening HIV: completed  Screening cholesterol: DUE  Screening diabetes: DUE  STD testing: no risk factors present  Depression screening: PHQ-2 assessment completed and reviewed - no intervention indicated at this time  Alcohol misuse screening: alcohol use reviewed - no intervention indicated at this time  Immunizations: reviewed; flu shot DUE    ASSESSMENT/PLAN                                                       (Z00.00) Routine general medical examination at a health care facility  (primary encounter diagnosis)  Comment: PMH, PSH, FH, SH, medications, allergies, immunizations, and preventative health measures reviewed.   Plan: see below for plans.    (Z23) Need for prophylactic vaccination and inoculation against influenza  Plan: flu shot given today.    (Z13.220) Screening for cholesterol level  (Z13.1) Screening for diabetes mellitus  (R53.83) Fatigue, unspecified type  Plan: patient will return for fasting labs when able.    (I83.92) Asymptomatic varicose vein of left lower extremity without complication  (I83.92) Spider vein of left lower extremity  Plan: [facility] referral provided - patient to schedule.    (R52) Mild pain  Comment: well-controlled with ibuprofen sparingly as needed.  Plan: CPM; refills provided.    The instructions on the AVS were discussed and  explained to the patient. Patient expressed understanding of instructions.    (Chart documentation was completed, in part, with [employer] voice-recognition software. Even though reviewed, some grammatical, spelling, and word errors may remain.)    Estela Hernandez MD   [address] [address]  T: [phone], F: [phone]

## 2020-03-07 DIAGNOSIS — R53.83 FATIGUE, UNSPECIFIED TYPE: ICD-10-CM

## 2020-03-07 DIAGNOSIS — Z13.220 SCREENING FOR CHOLESTEROL LEVEL: ICD-10-CM

## 2020-03-07 DIAGNOSIS — Z13.1 SCREENING FOR DIABETES MELLITUS: ICD-10-CM

## 2020-03-07 LAB
ALBUMIN SERPL-MCNC: 3.2 G/DL (ref 3.4–5)
ALP SERPL-CCNC: 71 U/L (ref 40–150)
ALT SERPL W P-5'-P-CCNC: 25 U/L (ref 0–50)
ANION GAP SERPL CALCULATED.3IONS-SCNC: 2 MMOL/L (ref 3–14)
AST SERPL W P-5'-P-CCNC: 10 U/L (ref 0–45)
BILIRUB SERPL-MCNC: 0.4 MG/DL (ref 0.2–1.3)
BUN SERPL-MCNC: 12 MG/DL (ref 7–30)
CALCIUM SERPL-MCNC: 8.9 MG/DL (ref 8.5–10.1)
CHLORIDE SERPL-SCNC: 108 MMOL/L (ref 94–109)
CHOLEST SERPL-MCNC: 204 MG/DL
CO2 SERPL-SCNC: 27 MMOL/L (ref 20–32)
CREAT SERPL-MCNC: 0.73 MG/DL (ref 0.52–1.04)
ERYTHROCYTE [DISTWIDTH] IN BLOOD BY AUTOMATED COUNT: 14.3 % (ref 10–15)
FERRITIN SERPL-MCNC: 33 NG/ML (ref 12–150)
GFR SERPL CREATININE-BSD FRML MDRD: >90 ML/MIN/{1.73_M2}
GLUCOSE SERPL-MCNC: 98 MG/DL (ref 70–99)
HCT VFR BLD AUTO: 43.1 % (ref 35–47)
HDLC SERPL-MCNC: 44 MG/DL
HGB BLD-MCNC: 14.1 G/DL (ref 11.7–15.7)
IRON SATN MFR SERPL: 33 % (ref 15–46)
IRON SERPL-MCNC: 97 UG/DL (ref 35–180)
LDLC SERPL CALC-MCNC: 139 MG/DL
MCH RBC QN AUTO: 30.5 PG (ref 26.5–33)
MCHC RBC AUTO-ENTMCNC: 32.7 G/DL (ref 31.5–36.5)
MCV RBC AUTO: 93 FL (ref 78–100)
NONHDLC SERPL-MCNC: 160 MG/DL
PLATELET # BLD AUTO: 306 10E9/L (ref 150–450)
POTASSIUM SERPL-SCNC: 4 MMOL/L (ref 3.4–5.3)
PROT SERPL-MCNC: 7.9 G/DL (ref 6.8–8.8)
RBC # BLD AUTO: 4.63 10E12/L (ref 3.8–5.2)
SODIUM SERPL-SCNC: 137 MMOL/L (ref 133–144)
TIBC SERPL-MCNC: 292 UG/DL (ref 240–430)
TRIGL SERPL-MCNC: 106 MG/DL
TSH SERPL DL<=0.005 MIU/L-ACNC: 2.5 MU/L (ref 0.4–4)
WBC # BLD AUTO: 8.3 10E9/L (ref 4–11)

## 2020-03-07 PROCEDURE — 82728 ASSAY OF FERRITIN: CPT | Performed by: INTERNAL MEDICINE

## 2020-03-07 PROCEDURE — 83550 IRON BINDING TEST: CPT | Performed by: INTERNAL MEDICINE

## 2020-03-07 PROCEDURE — 80053 COMPREHEN METABOLIC PANEL: CPT | Performed by: INTERNAL MEDICINE

## 2020-03-07 PROCEDURE — 36415 COLL VENOUS BLD VENIPUNCTURE: CPT | Performed by: INTERNAL MEDICINE

## 2020-03-07 PROCEDURE — 80061 LIPID PANEL: CPT | Performed by: INTERNAL MEDICINE

## 2020-03-07 PROCEDURE — 85027 COMPLETE CBC AUTOMATED: CPT | Performed by: INTERNAL MEDICINE

## 2020-03-07 PROCEDURE — 84443 ASSAY THYROID STIM HORMONE: CPT | Performed by: INTERNAL MEDICINE

## 2020-03-07 PROCEDURE — 83540 ASSAY OF IRON: CPT | Performed by: INTERNAL MEDICINE

## 2020-03-07 PROCEDURE — 82306 VITAMIN D 25 HYDROXY: CPT | Performed by: INTERNAL MEDICINE

## 2020-03-10 LAB — DEPRECATED CALCIDIOL+CALCIFEROL SERPL-MC: 34 UG/L (ref 20–75)

## 2020-03-12 ENCOUNTER — TELEPHONE (OUTPATIENT)
Dept: INTERNAL MEDICINE | Facility: CLINIC | Age: 35
End: 2020-03-12

## 2020-03-12 NOTE — TELEPHONE ENCOUNTER
Reason for Call:  Other call back    Detailed comments: Patient would like a call back regarding her lab results. I did tell her a result letter has been sent to her via mail. She would still like a call back.     Phone Number Patient can be reached at: Home number on file [phone] (home)    Best Time: anytime    Can we leave a detailed message on this number? YES    Call taken on 3/12/2020 at 4:00 PM by Becki Russell

## 2020-03-13 NOTE — TELEPHONE ENCOUNTER
Left voicemail with patient informing her that her labs came back within normal limits per Dr. Hernandez and a letter with labs stating this has been sent to her home address.    ANSON Bishop

## 2020-04-13 ENCOUNTER — NURSE TRIAGE (OUTPATIENT)
Dept: INTERNAL MEDICINE | Facility: CLINIC | Age: 35
End: 2020-04-13

## 2020-04-13 NOTE — TELEPHONE ENCOUNTER
"  Additional Information    Negative: Chest pain    Negative: Breastfeeding questions    Negative: Postpartum breast pain and swelling, is breastfeeding    Negative: Postpartum breast pain and swelling, not breastfeeding    Negative: Small spot, skin growth or mole    Negative: Patient sounds very sick or weak to the triager    Negative: SEVERE breast pain and fever > 103 F (39.4 C)    Breast lump    Negative: Breast looks infected (spreading redness, feels hot or painful to touch) and no fever    Negative: Painful rash and multiple small blisters grouped together (i.e., dermatomal distribution or \"band\" or \"stripe\")    Negative: Cuts, burns, or bruises of breasts and suspicious history for the injury    Negative: Breast looks infected (spreading redness, feels hot or painful to touch) and fever    Answer Assessment - Initial Assessment Questions  1. SYMPTOM: \"What's the main symptom you're concerned about?\"  (e.g., lump, pain, rash, nipple discharge)      Lump in between the breast. Pain when she presses on it.   2. LOCATION: \"Where is the lump located?\"      Middle of bra line  3. ONSET: \"When did lump  start?\"      Lump present for a while. Became more noticeable and sensitive. Also lump feels hard to touch.  4. PRIOR HISTORY: \"Do you have any history of prior problems with your breasts?\" (e.g., lumps, cancer, fibrocystic breast disease)      No  5. CAUSE: \"What do you think is causing this symptom?\"      Unsure, has not tried any ice or heat  6. OTHER SYMPTOMS: \"Do you have any other symptoms?\" (e.g., fever, breast pain, redness or rash, nipple discharge)      No redness. No rash. No reports of chills.   7. PREGNANCY-BREASTFEEDING: \"Is there any chance you are pregnant?\" \"When was your last menstrual period?\" \"Are you breastfeeding?\"      Not breastfeeding. LMP end of march    Protocols used: BREAST SYMPTOMS-A-[place]    "

## 2020-04-13 NOTE — TELEPHONE ENCOUNTER
Reason for Call:  Patient Request    Detailed comments: Patient needs to be seen for a lump between her breasts. She said that is was painful to touch.    Phone Number Patient can be reached at: Home number on file [phone] (home)    Best Time: Anytime    Can we leave a detailed message on this number? YES    Call taken on 4/13/2020 at 12:58 PM by Pito Phan

## 2020-04-14 ENCOUNTER — OFFICE VISIT (OUTPATIENT)
Dept: INTERNAL MEDICINE | Facility: CLINIC | Age: 35
End: 2020-04-14
Payer: COMMERCIAL

## 2020-04-14 VITALS
RESPIRATION RATE: 16 BRPM | WEIGHT: 200.1 LBS | DIASTOLIC BLOOD PRESSURE: 76 MMHG | TEMPERATURE: 98.3 F | SYSTOLIC BLOOD PRESSURE: 118 MMHG | BODY MASS INDEX: 34.35 KG/M2 | HEART RATE: 64 BPM | OXYGEN SATURATION: 99 %

## 2020-04-14 DIAGNOSIS — K21.9 GASTROESOPHAGEAL REFLUX DISEASE, ESOPHAGITIS PRESENCE NOT SPECIFIED: ICD-10-CM

## 2020-04-14 DIAGNOSIS — K03.89: ICD-10-CM

## 2020-04-14 DIAGNOSIS — R07.89 CHEST WALL PAIN: Primary | ICD-10-CM

## 2020-04-14 DIAGNOSIS — K06.8 BLEEDING GUMS: ICD-10-CM

## 2020-04-14 PROCEDURE — 99214 OFFICE O/P EST MOD 30 MIN: CPT | Performed by: INTERNAL MEDICINE

## 2020-04-14 RX ORDER — FAMOTIDINE 40 MG/1
40 TABLET, FILM COATED ORAL DAILY
Qty: 90 TABLET | Refills: 1 | Status: SHIPPED | OUTPATIENT
Start: 2020-04-14 | End: 2020-09-15

## 2020-04-14 NOTE — PATIENT INSTRUCTIONS
Xiphoid process - pain - avoid pushing/prodding on area.    For acid reflux, recommend Pepcid as needed.    For bleeding gums, please floss once daily and consistently (make sure floss pressure is against teeth, not gums).     For sensitive teeth, recommend Sensodyne toothpaste.

## 2020-04-14 NOTE — PROGRESS NOTES
SUBJECTIVE:                                                      HPI: Ethel Chi is a pleasant 35 year old female who presents with several concerns:    Accompanied by daughter, who helps translate.    Complains of chest wall pain and swelling for the last couple of months. Indicates area above the xiphoid process. Patient admits that she frequently palpates/prods area, which often makes symptoms worse. No redness or skin changes. No fevers or chills.    Complains of acid reflux, worse after eating spicy foods. Occurs at least once daily.    Complains of bleeding gums. Admits that she does not floss regularly.    Complains of sensitive teeth. Uses a normal, nonsensitive toothpaste.    The medication, allergy, and problem lists have been reviewed and updated as appropriate.     OBJECTIVE:                                                      /76   Pulse 64   Temp 98.3  F (36.8  C) (Oral)   Resp 16   Wt 90.8 kg (200 lb 1.6 oz)   LMP 03/30/2020 (Within Days)   SpO2 99%   BMI 34.35 kg/m    Constitutional: well-appearing  Respiratory: normal respiratory effort; clear to auscultation bilaterally  Cardiovascular: regular rate and rhythm; no edema  Chest wall: tenderness to palpation over xiphoid process; no redness, swelling, warmth, or skin changes  Gastrointestinal: soft, non-tender, non-distended, and bowel sounds present; no organomegaly or masses     ASSESSMENT/PLAN:                                                      (R07.89) Chest wall pain  (primary encounter diagnosis)  Comment: reported pain and swelling seem to be localized to xiphoid process, which patient admittedly palpate/prods regularly, likely exacerbating pain and swelling.  Plan: patient encouraged to avoid palpating/prodding area; may use cool or warm compresses as needed for pain relief.    (K21.9) Gastroesophageal reflux disease, esophagitis presence not specified  Plan: in addition to avoiding spicy foods, patient may use Pepcid as  needed.    (K06.8) Bleeding gums  Plan: patient encouraged to floss daily and consistently.    (K03.89) Sensitive dentin  Plan: recommend Sensodyne or sensitive version of preferred toothpaste.    The instructions on the AVS were discussed and explained to the patient. Patient expressed understanding of instructions.    (Chart documentation was completed, in part, with [employer] voice-recognition software. Even though reviewed, some grammatical, spelling, and word errors may remain.)    Estela Hernandez MD   [address] [address]  T: [phone], F: [phone]

## 2020-05-12 ENCOUNTER — NURSE TRIAGE (OUTPATIENT)
Dept: INTERNAL MEDICINE | Facility: CLINIC | Age: 35
End: 2020-05-12

## 2020-05-12 NOTE — TELEPHONE ENCOUNTER
Call from patient daughter today. States she would like to set up a F2F OV with their provider to discuss some concerns in further detail.     Patient feels like her iron is low  Having some recent hair loss   Bilateral great toes are black/discolored. States when this has happened twice in the past the toe nails had fallen off.     F2F OV was scheduled for 5/14/2020 with PCP.        Additional Information    Negative: Looks infected (e.g., spreading redness, red streak, pus) with fever    Negative: Spreading redness or red streak larger than 1 inch (2.5 cm)    Negative: Severe pain (excruciating) not improved 2 hours after pain medicine and antibiotic ointment    Negative: Child sounds very sick or weak to the triager    Negative: Spreading redness or red streak without fever    Negative: Entire toe is red    Negative: Entire toe is swollen    Negative: Pus (yellow or green) seen in skin around toenail (cuticle area) OR under toenail    Negative: Can't locate and free up corner of toenail    Negative: After using Care Advice > 3 days, moderate pain (e.g., limping, interferes with normal activities) present    Negative: After using Care Advice > 7 days, not improved    Negative: After using Care Advice > 14 days, not gone    Negative: Triager thinks child needs to be seen for non-urgent acute problem    Negative: Caller wants child seen for non-urgent problem    Negative: Ingrown toenail(s) are an ongoing, recurrent problem    Negative: Thickened, ugly-appearing toenail    Negative: Ingrown toenail - minor    Negative: Preventing ingrown toenails, questions about    Negative: Head lice suspected (e.g., head itching and lice or nits are seen)    Negative: Patient sounds very sick or weak to the triager    Negative: [1] Scalp is red and painful in area of hair loss AND [2] large (more than 1 inch; 2.5 cm)    Negative: [1] Scalp is red and painful in area of hair loss AND [2] small (less than 1 inch; 2.5 cm)     "Negative: Scabs or crusts are present in the hair    Negative: Ringworm of the scalp suspected (round patch of hair loss with scales, rough surface, redness or itching)    Negative: [1] Recently diagnosed with ringworm AND [2] gets WORSE on treatment (e.g., gets larger, more spots)    Negative: Chemotherapy and questions about hair loss    Negative: Hair loss from nervous habit of twisting (or pulling) the hair    Negative: [1] Patch of hair loss AND [2] cause not known    Negative: [1] Hair thinning, hair loss, or balding AND [2] cause not known  (e.g., no recent precipitating factors such as childbirth, weight loss surgery)    Negative: [1] Normal hair loss from aging suspected BUT [2] requesting to talk with doctor (treatments, hair transplants)    Negative: Normal hair loss or thinning from aging suspected    Negative: Hair loss follows a major stress about 3 months ago    Negative: Hair loss from tight hair style (e.g., davie, hair pulled back in ponytail)    Answer Assessment - Initial Assessment Questions  1. LOCATION: \"Which toe?\"       Bilateral bit toes  2. APPEARANCE: \"What does it look like?\"       Black/bruised  3. ONSET: \"When did it start? \"       Noticed today for the first time  4. PAIN: \"Is there any pain?\" If so, ask: \"How bad is the pain?\"   (Mild, Moderate, Severe)      no  5. REDNESS: \"Is there any redness of the skin?\" If so, ask: \"How much of the toe is red?\"      no    Answer Assessment - Initial Assessment Questions  1. LOCATION: \"Where is the hair loss?\" (e.g., all of scalp, parts of scalp, back of head or neck)      scalp  2. DESCRIPTION: \"Please describe it.? (e.g., thinning of hair, balding, patches of hair missing)      thinning  3. ONSET: \"When did the hair loss begin?\" (e.g., sudden or gradual onset; days, weeks, months or years ago)      weeks  4. OTHER SYMPTOMS: \"What does the scalp look like where the hair is missing?\" (e.g., normal, redness, crusts, scarring)      Low iron, " "bilateral black/bruised big toes  5. OTHER FACTORS: \"Have you had any of the following recently: childbirth, severe illness or injury, major surgery, major weight loss, cancer chemo, tight hair davie, serious stress?\"      unknown  6. CAUSE: \"What do you think is causing the hair loss?\"      unknown    Protocols used: TOENAIL - INGROWN-P-OH, HAIR LOSS-A-AH      "

## 2020-05-18 ENCOUNTER — VIRTUAL VISIT (OUTPATIENT)
Dept: INTERNAL MEDICINE | Facility: CLINIC | Age: 35
End: 2020-05-18
Payer: COMMERCIAL

## 2020-05-18 DIAGNOSIS — Z53.9 NO SHOW: Primary | ICD-10-CM

## 2020-06-26 ENCOUNTER — OFFICE VISIT (OUTPATIENT)
Dept: URGENT CARE | Facility: URGENT CARE | Age: 35
End: 2020-06-26
Payer: COMMERCIAL

## 2020-06-26 VITALS
HEART RATE: 87 BPM | BODY MASS INDEX: 34.33 KG/M2 | SYSTOLIC BLOOD PRESSURE: 95 MMHG | TEMPERATURE: 98.6 F | RESPIRATION RATE: 16 BRPM | OXYGEN SATURATION: 98 % | WEIGHT: 200 LBS | DIASTOLIC BLOOD PRESSURE: 62 MMHG

## 2020-06-26 DIAGNOSIS — K29.70 GASTRITIS WITHOUT BLEEDING, UNSPECIFIED CHRONICITY, UNSPECIFIED GASTRITIS TYPE: ICD-10-CM

## 2020-06-26 DIAGNOSIS — H61.21 IMPACTED CERUMEN OF RIGHT EAR: Primary | ICD-10-CM

## 2020-06-26 DIAGNOSIS — L60.8 DISCOLORATION AND THICKENING OF NAILS BOTH FEET: ICD-10-CM

## 2020-06-26 PROCEDURE — 69209 REMOVE IMPACTED EAR WAX UNI: CPT | Mod: RT | Performed by: FAMILY MEDICINE

## 2020-06-26 PROCEDURE — 99213 OFFICE O/P EST LOW 20 MIN: CPT | Mod: 25 | Performed by: FAMILY MEDICINE

## 2020-06-26 RX ORDER — OMEPRAZOLE 40 MG/1
40 CAPSULE, DELAYED RELEASE ORAL DAILY
Qty: 30 CAPSULE | Refills: 1 | Status: SHIPPED | OUTPATIENT
Start: 2020-06-26 | End: 2020-09-15

## 2020-06-26 NOTE — PROGRESS NOTES
SUBJECTIVE:   Ethel Chi is a 35 year old female presenting with a chief complaint of Bilateral big toenail discoloration with nail separation for the right big toe.  She exercises and various types of shoes and I feel this is happening related to the nail injury.  She also has concerns about epigastric discomfort with a lot of burping especially when she has spicy food or any solid food.  This has been going on for couple of weeks.  Denies any nausea or throwing up.  Also has been noticing diminished hearing from her right ear with no pain or any other systemic symptoms.  She is an established patient of [facility].  Onset of symptoms was 2 week(s) ago.  Course of illness is worsening.    Severity moderate  Current and Associated symptoms: diminished hearing rt ear   Treatment measures tried include None tried.  Predisposing factors include ear wax for both ear.    Past Medical History:   Diagnosis Date     ASCUS of cervix with negative high risk HPV 8/1/2019 8/1/19 ASCUS pap, Neg HPV.  Plan: cotest in 3 years     Early onset menopause      Obesity (BMI 30-39.9)      Current Outpatient Medications   Medication Sig Dispense Refill     famotidine (PEPCID) 40 MG tablet Take 1 tablet (40 mg) by mouth daily 90 tablet 1     ibuprofen (ADVIL/MOTRIN) 600 MG tablet Take 1 tablet (600 mg) by mouth every 8 hours as needed for moderate pain 20 tablet 1     norethindrone-ethinyl estradiol (JUNEL FE 1/20) 1-20 MG-MCG tablet Take 1 tablet by mouth daily 84 tablet 3     omeprazole (PRILOSEC) 40 MG DR capsule Take 1 capsule (40 mg) by mouth daily 30 capsule 1     Social History     Tobacco Use     Smoking status: Never Smoker     Smokeless tobacco: Never Used   Substance Use Topics     Alcohol use: No     Family History   Problem Relation Age of Onset     Hypertension Mother      Diabetes Type 2  Father      Throat cancer Maternal Uncle      Myocardial Infarction No family hx of      Cerebrovascular Disease No family hx of       Coronary Artery Disease Early Onset No family hx of      Breast Cancer No family hx of      Ovarian Cancer No family hx of      Colon Cancer No family hx of          ROS:    10 point ROS of systems including Constitutional, Eyes, Respiratory, Cardiovascular,  Genitourinary,neurology, Muscularskeletal, Psychiatric were all negative except for pertinent positives noted in my HPI         OBJECTIVE:  BP 95/62   Pulse 87   Temp 98.6  F (37  C) (Tympanic)   Resp 16   Wt 90.7 kg (200 lb)   SpO2 98%   BMI 34.33 kg/m    GENERAL APPEARANCE: healthy, alert and no distress  EYES: EOMI,  PERRL, conjunctiva clear  HENT: rt ear canals, impacted wax noted after ear wax removal tympanic membrane looked normal  On the right     Nose and mouth without ulcers, erythema or lesions  NECK: supple, nontender, no lymphadenopathy  RESP: lungs clear to auscultation - no rales, rhonchi or wheezes  CV: regular rates and rhythm, normal S1 S2, no murmur noted  ABDOMEN:  soft, nontender, no HSM or masses and bowel sounds normal  NEURO: Normal strength and tone, sensory exam grossly normal,  normal speech and mentation  SKIN: no suspicious lesions or rashes, bilateral both big toe nail yellowish-grayish discoloration noted with thickening in the right big toenail is almost  from the nailbed.  Looks like these are all related to nail injury  PSYCH: mentation appears normal  Physical Exam      X-Ray was not done.    Medical Decision Making:    Differential Diagnosis:  Gastro: Gastritis/ H. pylori infection/GERD      ASSESSMENT:  Ethel was seen today for toe pain and ear problem.    Diagnoses and all orders for this visit:    Impacted cerumen of right ear    Discoloration and thickening of nails both feet    Gastritis without bleeding, unspecified chronicity, unspecified gastritis type  -     omeprazole (PRILOSEC) 40 MG DR capsule; Take 1 capsule (40 mg) by mouth daily          PLAN:    See orders in Epic  I reviewed with patient that  the nail discoloration is possibly related to wearing tight shoes nail injury avoid wearing narrow shoes and avoid nail injury that should possibly help the nails to grow back normal.  We will get it is advised patient not to take too much of a spicy food try doing Prilosec  For wax impaction ear wash was done for patient.  Follow up if  symptoms fail to improve or worsens   Pt understood and agreed with plan     Blanche Hunter MD

## 2020-09-15 ENCOUNTER — OFFICE VISIT (OUTPATIENT)
Dept: INTERNAL MEDICINE | Facility: CLINIC | Age: 35
End: 2020-09-15
Payer: COMMERCIAL

## 2020-09-15 VITALS
BODY MASS INDEX: 27.53 KG/M2 | HEART RATE: 50 BPM | OXYGEN SATURATION: 98 % | DIASTOLIC BLOOD PRESSURE: 64 MMHG | SYSTOLIC BLOOD PRESSURE: 100 MMHG | RESPIRATION RATE: 16 BRPM | WEIGHT: 160.4 LBS

## 2020-09-15 DIAGNOSIS — E28.319 EARLY ONSET MENOPAUSE: ICD-10-CM

## 2020-09-15 DIAGNOSIS — K30 STOMACH UPSET: ICD-10-CM

## 2020-09-15 DIAGNOSIS — R53.83 FATIGUE, UNSPECIFIED TYPE: Primary | ICD-10-CM

## 2020-09-15 DIAGNOSIS — R52 MILD PAIN: ICD-10-CM

## 2020-09-15 DIAGNOSIS — L65.9 HAIR LOSS: ICD-10-CM

## 2020-09-15 LAB
ALBUMIN SERPL-MCNC: 3.6 G/DL (ref 3.4–5)
ALP SERPL-CCNC: 99 U/L (ref 40–150)
ALT SERPL W P-5'-P-CCNC: 20 U/L (ref 0–50)
ANION GAP SERPL CALCULATED.3IONS-SCNC: 4 MMOL/L (ref 3–14)
AST SERPL W P-5'-P-CCNC: 17 U/L (ref 0–45)
BILIRUB SERPL-MCNC: 0.4 MG/DL (ref 0.2–1.3)
BUN SERPL-MCNC: 15 MG/DL (ref 7–30)
CALCIUM SERPL-MCNC: 9.2 MG/DL (ref 8.5–10.1)
CHLORIDE SERPL-SCNC: 108 MMOL/L (ref 94–109)
CO2 SERPL-SCNC: 26 MMOL/L (ref 20–32)
CREAT SERPL-MCNC: 0.61 MG/DL (ref 0.52–1.04)
DEPRECATED CALCIDIOL+CALCIFEROL SERPL-MC: 32 UG/L (ref 20–75)
ERYTHROCYTE [DISTWIDTH] IN BLOOD BY AUTOMATED COUNT: 15.4 % (ref 10–15)
FERRITIN SERPL-MCNC: 23 NG/ML (ref 12–150)
GFR SERPL CREATININE-BSD FRML MDRD: >90 ML/MIN/{1.73_M2}
GLUCOSE SERPL-MCNC: 70 MG/DL (ref 70–99)
HCT VFR BLD AUTO: 45.6 % (ref 35–47)
HGB BLD-MCNC: 14.4 G/DL (ref 11.7–15.7)
IRON SATN MFR SERPL: 19 % (ref 15–46)
IRON SERPL-MCNC: 61 UG/DL (ref 35–180)
MCH RBC QN AUTO: 29.4 PG (ref 26.5–33)
MCHC RBC AUTO-ENTMCNC: 31.6 G/DL (ref 31.5–36.5)
MCV RBC AUTO: 93 FL (ref 78–100)
PLATELET # BLD AUTO: 276 10E9/L (ref 150–450)
POTASSIUM SERPL-SCNC: 3.5 MMOL/L (ref 3.4–5.3)
PROT SERPL-MCNC: 8.2 G/DL (ref 6.8–8.8)
RBC # BLD AUTO: 4.9 10E12/L (ref 3.8–5.2)
SODIUM SERPL-SCNC: 138 MMOL/L (ref 133–144)
TIBC SERPL-MCNC: 317 UG/DL (ref 240–430)
TSH SERPL DL<=0.005 MIU/L-ACNC: 1.24 MU/L (ref 0.4–4)
WBC # BLD AUTO: 7.2 10E9/L (ref 4–11)

## 2020-09-15 PROCEDURE — 83550 IRON BINDING TEST: CPT | Performed by: INTERNAL MEDICINE

## 2020-09-15 PROCEDURE — 99214 OFFICE O/P EST MOD 30 MIN: CPT | Performed by: INTERNAL MEDICINE

## 2020-09-15 PROCEDURE — 82728 ASSAY OF FERRITIN: CPT | Performed by: INTERNAL MEDICINE

## 2020-09-15 PROCEDURE — 85027 COMPLETE CBC AUTOMATED: CPT | Performed by: INTERNAL MEDICINE

## 2020-09-15 PROCEDURE — 36415 COLL VENOUS BLD VENIPUNCTURE: CPT | Performed by: INTERNAL MEDICINE

## 2020-09-15 PROCEDURE — 80053 COMPREHEN METABOLIC PANEL: CPT | Performed by: INTERNAL MEDICINE

## 2020-09-15 PROCEDURE — 84443 ASSAY THYROID STIM HORMONE: CPT | Performed by: INTERNAL MEDICINE

## 2020-09-15 PROCEDURE — 83540 ASSAY OF IRON: CPT | Performed by: INTERNAL MEDICINE

## 2020-09-15 PROCEDURE — 82306 VITAMIN D 25 HYDROXY: CPT | Performed by: INTERNAL MEDICINE

## 2020-09-15 RX ORDER — NORETHINDRONE ACETATE AND ETHINYL ESTRADIOL 1MG-20(21)
1 KIT ORAL DAILY
Qty: 84 TABLET | Refills: 3 | Status: SHIPPED | OUTPATIENT
Start: 2020-09-15 | End: 2021-03-12

## 2020-09-15 RX ORDER — IBUPROFEN 600 MG/1
600 TABLET, FILM COATED ORAL EVERY 8 HOURS PRN
Qty: 20 TABLET | Refills: 1 | Status: SHIPPED | OUTPATIENT
Start: 2020-09-15 | End: 2023-05-17

## 2020-09-15 RX ORDER — OMEPRAZOLE 40 MG/1
40 CAPSULE, DELAYED RELEASE ORAL DAILY
Qty: 90 CAPSULE | Refills: 3 | Status: SHIPPED | OUTPATIENT
Start: 2020-09-15 | End: 2021-03-12

## 2020-09-15 NOTE — PROGRESS NOTES
SUBJECTIVE                                                      HPI: Ethel Chi is a very pleasant 35 year old female who presents with fatigue, hair loss, and stomach upset:    Accompanied by daughter, who helps translate.    Fatigue and hair loss have been ongoing for several months. Fatigue is generalized. Hair loss is reportedly throughout (nonfocal). Stomach upset is similar to prior episodes of stomach upset, previously well controlled with omeprazole.    Of note, patient has known early onset menopause and was taking an OCP until several months ago. Patient decided to discontinue OCP several months ago.     Patient also requests refills of ibuprofen.    OBJECTIVE                                                      /64   Pulse 50   Resp 16   Wt 72.8 kg (160 lb 6.4 oz)   LMP  (LMP Unknown)   SpO2 98%   BMI 27.53 kg/m    Constitutional: well-appearing  Respiratory: normal respiratory effort; clear to auscultation bilaterally  Cardiovascular: regular rate and rhythm; no edema  Gastrointestinal: soft, non-tender, non-distended; no organomegaly or masses   Musculoskeletal: normal gait and station  Psych: normal mood and affect; normal judgment and insight; recent and remote memory intact    ASSESSMENT/PLAN                                                      (R53.83) Fatigue, unspecified type  (primary encounter diagnosis)  (E28.319) Early onset menopause  Comment: suspect fatigue is related to stopping OCP (as she is now back in menopause).  Plan: labs today to evaluate for potential organic causes or contributors to fatigue; RESTART OCP.     (L65.9) Hair loss  (E28.319) Early onset menopause  Comment: suspect hair loss related to stopping OCP (as she is now back in menopause) and/or androgenic alopecia.  Plan: Labs today to evaluate for potential organic causes or contributors to hair loss; RESTART OCP; if labs unrevealing, will consider trial of topical Rogaine.    (K30) Stomach upset  Comment:  previously well-controlled with omeprazole.  Plan: RESTART omeprazole.    (R52) Mild pain  Comment: well-controlled with ibuprofen as needed.  Plan: continue present management; refills provided; patient instructed to use sparingly as needed.    Estela Hernandez MD   [address] [address]  T: [phone], F: [phone]  (Note documentation was completed, in part, with [employer] voice-recognition software. Documentation was reviewed, but some grammatical, spelling, and word errors may remain.)

## 2020-09-15 NOTE — LETTER
09/15/20      Ethel Chi  [address] [place] [address]        Dear ,    It was a pleasure seeing you again the other day! I hope this finds you feeling better.    I wanted to let you know that your labs came back as normal.    Please feel free to contact me with any questions or concerns.      Take care,    Estela Hernandez MD   [place] [address] [address] [address]  T: [phone], F: [phone]

## 2020-11-04 NOTE — ED NOTES
Patient comes in for evaluation of palpitations and dizziness which started about 1500 today. Has had similar episodes in the past.  states it seems to correlate with the birth control she has been taking and she also has been getting sweaty. Patient states that it started when she was cleaning the house. ABCs intact.    Resolved,.

## 2021-02-22 ENCOUNTER — TELEPHONE (OUTPATIENT)
Dept: INTERNAL MEDICINE | Facility: CLINIC | Age: 36
End: 2021-02-22

## 2021-02-22 NOTE — TELEPHONE ENCOUNTER
Reason for Call:  Other call back    Detailed comments: Patient is calling requesting to speak with care team please call patient back.     Phone Number Patient can be reached at: Home number on file [phone] (home)    Best Time: anytime    Can we leave a detailed message on this number? YES    Call taken on 2/22/2021 at 3:42 PM by Shazia Huff

## 2021-02-23 NOTE — TELEPHONE ENCOUNTER
Called and no answer unable to LM pt does have upcoming appt 03/12/2021    Charissa Gardner, HELADIO

## 2021-03-12 ENCOUNTER — OFFICE VISIT (OUTPATIENT)
Dept: INTERNAL MEDICINE | Facility: CLINIC | Age: 36
End: 2021-03-12
Payer: COMMERCIAL

## 2021-03-12 VITALS
WEIGHT: 171 LBS | HEART RATE: 60 BPM | BODY MASS INDEX: 29.19 KG/M2 | TEMPERATURE: 97.6 F | DIASTOLIC BLOOD PRESSURE: 64 MMHG | RESPIRATION RATE: 16 BRPM | HEIGHT: 64 IN | SYSTOLIC BLOOD PRESSURE: 90 MMHG | OXYGEN SATURATION: 98 %

## 2021-03-12 DIAGNOSIS — E28.319 EARLY ONSET MENOPAUSE: ICD-10-CM

## 2021-03-12 DIAGNOSIS — R53.82 CHRONIC FATIGUE: ICD-10-CM

## 2021-03-12 DIAGNOSIS — Z00.00 ROUTINE HISTORY AND PHYSICAL EXAMINATION OF ADULT: Primary | ICD-10-CM

## 2021-03-12 DIAGNOSIS — Z11.59 ENCOUNTER FOR HEPATITIS C SCREENING TEST FOR LOW RISK PATIENT: ICD-10-CM

## 2021-03-12 DIAGNOSIS — Z76.89 REFERRAL OF PATIENT: ICD-10-CM

## 2021-03-12 LAB
ALBUMIN SERPL-MCNC: 3.1 G/DL (ref 3.4–5)
ALP SERPL-CCNC: 53 U/L (ref 40–150)
ALT SERPL W P-5'-P-CCNC: 15 U/L (ref 0–50)
ANION GAP SERPL CALCULATED.3IONS-SCNC: 2 MMOL/L (ref 3–14)
AST SERPL W P-5'-P-CCNC: 7 U/L (ref 0–45)
BILIRUB SERPL-MCNC: 0.3 MG/DL (ref 0.2–1.3)
BUN SERPL-MCNC: 14 MG/DL (ref 7–30)
CALCIUM SERPL-MCNC: 8.4 MG/DL (ref 8.5–10.1)
CHLORIDE SERPL-SCNC: 110 MMOL/L (ref 94–109)
CO2 SERPL-SCNC: 26 MMOL/L (ref 20–32)
CREAT SERPL-MCNC: 0.72 MG/DL (ref 0.52–1.04)
DEPRECATED CALCIDIOL+CALCIFEROL SERPL-MC: 20 UG/L (ref 20–75)
ERYTHROCYTE [DISTWIDTH] IN BLOOD BY AUTOMATED COUNT: 14.1 % (ref 10–15)
FERRITIN SERPL-MCNC: 50 NG/ML (ref 12–150)
GFR SERPL CREATININE-BSD FRML MDRD: >90 ML/MIN/{1.73_M2}
GLUCOSE SERPL-MCNC: 91 MG/DL (ref 70–99)
HCT VFR BLD AUTO: 44.6 % (ref 35–47)
HCV AB SERPL QL IA: NONREACTIVE
HGB BLD-MCNC: 14.2 G/DL (ref 11.7–15.7)
IRON SATN MFR SERPL: 52 % (ref 15–46)
IRON SERPL-MCNC: 145 UG/DL (ref 35–180)
MCH RBC QN AUTO: 30.6 PG (ref 26.5–33)
MCHC RBC AUTO-ENTMCNC: 31.8 G/DL (ref 31.5–36.5)
MCV RBC AUTO: 96 FL (ref 78–100)
PLATELET # BLD AUTO: 285 10E9/L (ref 150–450)
POTASSIUM SERPL-SCNC: 3.9 MMOL/L (ref 3.4–5.3)
PROT SERPL-MCNC: 7.5 G/DL (ref 6.8–8.8)
RBC # BLD AUTO: 4.64 10E12/L (ref 3.8–5.2)
SODIUM SERPL-SCNC: 138 MMOL/L (ref 133–144)
TIBC SERPL-MCNC: 281 UG/DL (ref 240–430)
TSH SERPL DL<=0.005 MIU/L-ACNC: 1.99 MU/L (ref 0.4–4)
VIT B12 SERPL-MCNC: 244 PG/ML (ref 193–986)
WBC # BLD AUTO: 8.5 10E9/L (ref 4–11)

## 2021-03-12 PROCEDURE — 80053 COMPREHEN METABOLIC PANEL: CPT | Performed by: INTERNAL MEDICINE

## 2021-03-12 PROCEDURE — 82728 ASSAY OF FERRITIN: CPT | Performed by: INTERNAL MEDICINE

## 2021-03-12 PROCEDURE — 99395 PREV VISIT EST AGE 18-39: CPT | Performed by: INTERNAL MEDICINE

## 2021-03-12 PROCEDURE — 84443 ASSAY THYROID STIM HORMONE: CPT | Performed by: INTERNAL MEDICINE

## 2021-03-12 PROCEDURE — 83540 ASSAY OF IRON: CPT | Performed by: INTERNAL MEDICINE

## 2021-03-12 PROCEDURE — 86803 HEPATITIS C AB TEST: CPT | Performed by: INTERNAL MEDICINE

## 2021-03-12 PROCEDURE — 82607 VITAMIN B-12: CPT | Performed by: INTERNAL MEDICINE

## 2021-03-12 PROCEDURE — 36415 COLL VENOUS BLD VENIPUNCTURE: CPT | Performed by: INTERNAL MEDICINE

## 2021-03-12 PROCEDURE — 83550 IRON BINDING TEST: CPT | Performed by: INTERNAL MEDICINE

## 2021-03-12 PROCEDURE — 85027 COMPLETE CBC AUTOMATED: CPT | Performed by: INTERNAL MEDICINE

## 2021-03-12 PROCEDURE — 82306 VITAMIN D 25 HYDROXY: CPT | Performed by: INTERNAL MEDICINE

## 2021-03-12 RX ORDER — NORETHINDRONE ACETATE AND ETHINYL ESTRADIOL 1MG-20(21)
1 KIT ORAL DAILY
Qty: 84 TABLET | Refills: 3 | Status: SHIPPED | OUTPATIENT
Start: 2021-03-12 | End: 2022-06-02

## 2021-03-12 ASSESSMENT — MIFFLIN-ST. JEOR: SCORE: 1450.65

## 2021-03-12 NOTE — LETTER
03/14/21      Ethel Chi  [address] [place] [address]        Dear MsMartínez,    It was a pleasure seeing you again the other day! I hope this finds you well.    I wanted to let you know that your labs came back as normal.    Please feel free to contact me with any questions or concerns.      Take care,    Estela Hernandez MD   [address] [address]  T: [phone], F: [phone]

## 2021-03-12 NOTE — PROGRESS NOTES
ASSESSMENT/PLAN                                                       (Z00.00) Routine history and physical examination of adult  (primary encounter diagnosis)  Comment: PMH, PSH, FH, SH, medications, allergies, immunizations, and preventative health measures reviewed and updated as appropriate.  Plan: see below for plans.      (R53.82) Chronic fatigue  Comment: etiology unclear; difficulty sleeping likely contributing.  Plan: labs today; recommendations to follow.    (E28.319) Early onset menopause  Plan: refills of OCP provided.    (Z11.59) Encounter for hepatitis C screening test for low risk patient  Plan: hepatitis C screen with above labs.    (Z76.89) Referral of patient  Plan: referred for comprehensive eye exam - patient will be contacted to schedule.      Estela Hernandez MD   [address] [address]  T: [phone], F: [phone]    NINO Chi is a very pleasant 36 year old female who presents for a physical.    Accompanied by daughter, who helps translate.    Patient complains of chronic fatigue and would like labs today to evaluate this further.  She does endorse difficulty sleeping as well.    ROS:  Constitutional: no unintentional weight loss or gain reported; no fevers, chills, or sweats reported  Cardiovascular: no chest pain, palpitations, or edema reported  Respiratory: no cough, wheezing, shortness of breath, or dyspnea on exertion reported  Gastrointestinal: no nausea, vomiting, constipation, diarrhea, or abdominal pain reported  Genitourinary: no urinary frequency, urgency, dysuria, or hematuria reported  Integumentary: no rash or pruritus reported  Musculoskeletal: no back pain, muscle pain, joint pain, or joint swelling reported  Neurologic: no focal weakness, numbness, or tingling reported  Hematologic: no easy bruising or bleeding reported  Endocrine: no heat or  cold intolerance reported; no polyuria or polydipsia reported  Psychiatric: no anxiety or depression reported    Past Medical History:   Diagnosis Date     ASCUS of cervix with negative high risk HPV 8/1/2019 8/1/19 ASCUS pap, Neg HPV.  Plan: cotest in 3 years     Early onset menopause      Past Surgical History:   Procedure Laterality Date     LAPAROSCOPIC CHOLECYSTECTOMY  2003     Family History   Problem Relation Age of Onset     Hypertension Mother      Diabetes Type 2  Father      Throat cancer Maternal Uncle      Myocardial Infarction No family hx of      Cerebrovascular Disease No family hx of      Coronary Artery Disease Early Onset No family hx of      Breast Cancer No family hx of      Ovarian Cancer No family hx of      Colon Cancer No family hx of      Social History     Occupational History     Occupation: Not working currently   Tobacco Use     Smoking status: Never Smoker     Smokeless tobacco: Never Used   Substance and Sexual Activity     Alcohol use: No     Drug use: No     Sexual activity: Not Currently   Social History Narrative    Single.    4 kids (all teenages as of 2021).     No formal exercise.     No Known Allergies     Current Outpatient Medications   Medication Sig     ibuprofen (ADVIL/MOTRIN) 600 MG tablet Take 1 tablet (600 mg) by mouth every 8 hours as needed for moderate pain     norethindrone-ethinyl estradiol (JUNEL FE 1/20) 1-20 MG-MCG tablet Take 1 tablet by mouth daily     Immunization History   Administered Date(s) Administered     Influenza (IIV3) PF 12/01/2009, 10/25/2011     Influenza Vaccine IM > 6 months Valent IIV4 03/04/2020     MMR 04/04/2007     Poliovirus, inactivated (IPV) 02/08/2012     TD (ADULT, 7+) 08/01/2019     TDAP Vaccine (Adacel) 06/12/2009     Twinrix A/B 02/08/2012     Typhoid IM 02/08/2012     Varicella 04/04/2007     Yellow Fever 02/08/2012     PREVENTATIVE HEALTH                                                      BMI: overweight  Blood pressure:  "within normal limits   Breast CA screening: not medically indicated at this time   Cervical CA screening: up to date   Colon CA screening: not medically indicated at this time   Lung CA screening: n/a   Dexa: not medically indicated at this time   Screening HCV: DUE  Screening HIV: completed  Screening cholesterol: not medically indicated at this time   Screening diabetes: not medically indicated at this time   STD testing: not sexually active  Alcohol misuse screening: alcohol use reviewed - no intervention indicated at this time  Immunizations: reviewed; up to date     OBJECTIVE                                                      BP 90/64 (BP Location: Left arm, Patient Position: Chair, Cuff Size: Adult Regular)   Pulse 60   Temp 97.6  F (36.4  C) (Temporal)   Resp 16   Ht 1.626 m (5' 4\")   Wt 77.6 kg (171 lb)   SpO2 98%   BMI 29.35 kg/m    Constitutional: well-appearing  Head, Ears, and Eyes: normocephalic; normal external auditory canal and pinna; tympanic membranes visualized and normal; normal lids and conjunctivae  Neck: supple, symmetric, no thyromegaly or lymphadenopathy  Respiratory: normal respiratory effort; clear to auscultation bilaterally  Cardiovascular: regular rate and rhythm; no edema  Gastrointestinal: soft, non-tender, and non-distended; no organomegaly or masses  Musculoskeletal: normal gait and station  Psych: normal judgment and insight; normal mood and affect; recent and remote memory intact    ---  (Note was completed, in part, with [employer] voice-recognition software. Documentation was reviewed, but some grammatical, spelling, and word errors may remain.)    "

## 2022-06-01 DIAGNOSIS — E28.319 EARLY ONSET MENOPAUSE: ICD-10-CM

## 2022-06-01 NOTE — LETTER
[facility]  [address] [address]  [phone]            Ethel Chi  [address] [place] [address]        June 2, 2022    Dear Ethel,    While refilling your DONNY FE 1/20 1-20 MG-MCG tablet prescription today, we noticed that you are due for an appointment with your provider.  We will refill your prescription for 1 fill, but a follow-up appointment must be made before any additional refills can be approved.     Taking care of your health is important to us and we look forward to seeing you in the near future.  Please call us at [phone] or [phone] (or use [phone]) to schedule an appointment.     Please disregard this notice if you have already made an appointment.    Sincerely,        [facility]

## 2022-06-02 RX ORDER — NORETHINDRONE ACETATE/ETHINYL ESTRADIOL AND FERROUS FUMARATE 1MG-20(21)
KIT ORAL
Qty: 84 TABLET | Refills: 0 | Status: SHIPPED | OUTPATIENT
Start: 2022-06-02 | End: 2022-10-03

## 2022-06-02 NOTE — TELEPHONE ENCOUNTER
Medication filled 1 time as pt is due for a follow-up in clinic. Pharmacy has been notified to inform patient to call clinic and schedule appointment. and , Please reach out to patient to schedule appointment.

## 2022-10-03 DIAGNOSIS — E28.319 EARLY ONSET MENOPAUSE: ICD-10-CM

## 2022-10-03 NOTE — TELEPHONE ENCOUNTER
Medication Question or Refill        What medication are you calling about (include dose and sig)?: DONNY RAYGOZA 1/20 1-20 MG-MCG tablet    Controlled Substance Agreement on file:   CSA -- Patient Level:    CSA: None found at the patient level.       Who prescribed the medication?:     Do you need a refill? Yes: PT ONLY HAS 3 DAYS LEFT OF MEDICATION     When did you use the medication last? DAILY     Patient offered an appointment? Yes: SCHEDULED FOR 10/27/22 AT 1PM     Do you have any questions or concerns?  No    Preferred Pharmacy:   [address] [address]  Phone: [phone] Fax: [phone] Alternate Fax: [phone], [phone]      Okay to leave a detailed message?: Yes at Home number on file [phone] (home)

## 2022-10-04 DIAGNOSIS — E28.319 EARLY ONSET MENOPAUSE: ICD-10-CM

## 2022-10-05 RX ORDER — NORETHINDRONE ACETATE AND ETHINYL ESTRADIOL 1MG-20(21)
1 KIT ORAL DAILY
Qty: 84 TABLET | Refills: 0 | Status: SHIPPED | OUTPATIENT
Start: 2022-10-05 | End: 2023-01-06

## 2022-10-07 RX ORDER — NORETHINDRONE ACETATE/ETHINYL ESTRADIOL AND FERROUS FUMARATE 1MG-20(21)
KIT ORAL
Qty: 84 TABLET | Refills: 0 | OUTPATIENT
Start: 2022-10-07

## 2022-10-07 NOTE — TELEPHONE ENCOUNTER
This refill is a duplicate of something already sent to the pharmacy or another refill already in process.  Juan Carlos Almanzar RN  [facility]

## 2023-02-14 ENCOUNTER — OFFICE VISIT (OUTPATIENT)
Dept: URGENT CARE | Facility: URGENT CARE | Age: 38
End: 2023-02-14
Payer: COMMERCIAL

## 2023-02-14 VITALS
OXYGEN SATURATION: 100 % | RESPIRATION RATE: 16 BRPM | DIASTOLIC BLOOD PRESSURE: 69 MMHG | BODY MASS INDEX: 31.76 KG/M2 | TEMPERATURE: 99.2 F | WEIGHT: 185 LBS | SYSTOLIC BLOOD PRESSURE: 100 MMHG | HEART RATE: 68 BPM

## 2023-02-14 DIAGNOSIS — K64.4 EXTERNAL HEMORRHOIDS: Primary | ICD-10-CM

## 2023-02-14 PROCEDURE — 99213 OFFICE O/P EST LOW 20 MIN: CPT | Performed by: FAMILY MEDICINE

## 2023-02-14 RX ORDER — HYDROCORTISONE 25 MG/G
CREAM TOPICAL 2 TIMES DAILY PRN
Qty: 30 G | Refills: 0 | Status: SHIPPED | OUTPATIENT
Start: 2023-02-14 | End: 2023-02-24

## 2023-02-14 NOTE — PROGRESS NOTES
SUBJECTIVE: Ethel Chi is a 38 year old female presenting with a chief complaint of rectyal pain.  Onset of symptoms was day(s) ago.  Past Medical History:   Diagnosis Date     Early onset menopause      No Known Allergies  Social History     Tobacco Use     Smoking status: Never     Smokeless tobacco: Never   Substance Use Topics     Alcohol use: No       ROS:  SKIN: no rash  GI: no vomiting    OBJECTIVE:  /69   Pulse 68   Temp 99.2  F (37.3  C) (Tympanic)   Resp 16   Wt 83.9 kg (185 lb)   SpO2 100%   BMI 31.76 kg/m  GENERAL APPEARANCE: healthy, alert and no distress  Rectal exam: external hemorrhoids noted      ICD-10-CM    1. External hemorrhoids  K64.4 hydrocortisone, Perianal, (HYDROCORTISONE) 2.5 % cream     Adult Colorectal Surgery  Referral          Fluids/Rest, f/u if worse/not any better

## 2023-02-17 NOTE — TELEPHONE ENCOUNTER
Diagnosis, Referred by & from: External Hemorrhoids; referred by Dr. Estrada   Appt date: 5/1/2023   NOTES STATUS DETAILS   OFFICE NOTE from referring provider Internal [facility]:  2/14/23 - UC OV with Dr. Estrada   OFFICE NOTE from other specialist Internal [facility]:  9/15/20 - PCC OV with Dr. Hernandez  6/26/20 - UC OV with Dr. Hunter   DISCHARGE SUMMARY from hospital N/A    DISCHARGE REPORT from the ER [facility]:  7/1/18 - ED OV with Dr. Reina   OPERATIVE REPORT N/A    MEDICATION LIST Internal    LABS N/A    DIAGNOSTIC PROCEDURES N/A    IMAGING (DISC & REPORT)      CT Internal [facility]:  6/17/16 - CT Abd/Pelvis   ULTRASOUND  (ENDOANAL/ENDORECTAL) Internal [facility]:  4/26/18 - US Pelvic

## 2023-04-13 ENCOUNTER — TELEPHONE (OUTPATIENT)
Dept: INTERNAL MEDICINE | Facility: CLINIC | Age: 38
End: 2023-04-13
Payer: COMMERCIAL

## 2023-04-13 DIAGNOSIS — E28.319 EARLY ONSET MENOPAUSE: ICD-10-CM

## 2023-04-13 NOTE — TELEPHONE ENCOUNTER
FYI-    Pt called requesting birth control refill before physical appt 05/15/2023 but pt states she needs rx now.     Pt was advised she needs to be seen for refill since her last appt with PCP was 03/12/2021. Triage offered appt today at [place] or [facility]. Pt opts to call [facility].     No further action is needed if provider agrees with triage advice.       Future Appointments 4/13/2023 - 10/10/2023      Date Visit Type Length Department Provider     5/1/2023  3:00 PM NEW PATIENT 60 min [facility] Rachna Mckeon PA-C    Location Instructions:     Located in the Clinics and Surgery Center at [address] [place] [address]. For parking options, enter the [facility] /arrival plaza from [facility] and attendants can assist you based on your needs.  parking is available for those with limited mobility M-F from 7 a.m. to 5 p.m. Due to short staffing, we are unable to offer  to all patients/visitors. Visit [email or web address] for more details.  Self-parking:&nbsp;  West Lot: Located across from the main entrance, this is a convenient option for patients. Enter on [facility]. Parking attendants available most hours to assist.&nbsp;     [facility] Ramp: Enter at the [facility] SE entrance (one block north of the [facility] main entrance). Do not enter the ramp from [facility] - this entrance is not staffed and is further from the [facility] main entrance.              5/15/2023  3:00 PM PREVENTATIVE ADULT 30 min  INTERNAL MEDICINE Estela Hernandez MD    Location Instructions:     [facility] is in the [facility] at [address] in [place]. This just east of the [address] exit off of [address]. Free parking is available; access the lot from [address] [place] [address] or [facility].

## 2023-05-01 ENCOUNTER — PRE VISIT (OUTPATIENT)
Dept: SURGERY | Facility: CLINIC | Age: 38
End: 2023-05-01

## 2023-07-11 NOTE — PROGRESS NOTES
"Pre-Visit Planning   Next 5 appointments (look out 90 days)    Jul 12, 2023  1:30 PM  (Arrive by 1:10 PM)  Adult Preventative Visit with Susan Rachele Haase, APRN CNP  [facility] ([facility] ) [address] [facility] [address] [phone]        Appointment Notes for this encounter:   physical    Questionnaires Reviewed/Assigned  No additional questionnaires are needed    Patient preferred phone number: [phone]      Spoke to patient via phone, provided address of clinic. Patient does not have additional questions or concerns.        Visit is preventive. Reviewed purpose of preventive visit with patient.    Patient is due for:  Health Maintenance Due   Topic Date Due     ANNUAL REVIEW OF HM ORDERS  Never done     YEARLY PREVENTIVE VISIT  03/12/2022     HPV TEST  08/01/2022     PAP  08/01/2022     PHQ-2 (once per calendar year)  01/01/2023     [employer]  Can offer in clinic    Questionnaire Review   Advised patient to arrive early in order to complete questionnaires.    Call Summary  \"Thank you for your time today.  If anything comes up before your appointment, please feel free to contact us at [phone].\"      "

## 2023-07-12 ENCOUNTER — OFFICE VISIT (OUTPATIENT)
Dept: FAMILY MEDICINE | Facility: CLINIC | Age: 38
End: 2023-07-12
Payer: COMMERCIAL

## 2023-07-12 VITALS
TEMPERATURE: 98.1 F | HEART RATE: 54 BPM | WEIGHT: 179 LBS | HEIGHT: 64 IN | BODY MASS INDEX: 30.56 KG/M2 | RESPIRATION RATE: 12 BRPM | OXYGEN SATURATION: 99 % | DIASTOLIC BLOOD PRESSURE: 64 MMHG | SYSTOLIC BLOOD PRESSURE: 94 MMHG

## 2023-07-12 DIAGNOSIS — N91.2 AMENORRHEA: ICD-10-CM

## 2023-07-12 DIAGNOSIS — R87.610 ASCUS OF CERVIX WITH NEGATIVE HIGH RISK HPV: ICD-10-CM

## 2023-07-12 DIAGNOSIS — Z12.4 CERVICAL CANCER SCREENING: ICD-10-CM

## 2023-07-12 DIAGNOSIS — Z00.00 ROUTINE GENERAL MEDICAL EXAMINATION AT A HEALTH CARE FACILITY: Primary | ICD-10-CM

## 2023-07-12 PROCEDURE — 88175 CYTOPATH C/V AUTO FLUID REDO: CPT | Performed by: NURSE PRACTITIONER

## 2023-07-12 PROCEDURE — 87624 HPV HI-RISK TYP POOLED RSLT: CPT | Performed by: NURSE PRACTITIONER

## 2023-07-12 PROCEDURE — 99395 PREV VISIT EST AGE 18-39: CPT | Performed by: NURSE PRACTITIONER

## 2023-07-12 PROCEDURE — 88141 CYTOPATH C/V INTERPRET: CPT | Performed by: PATHOLOGY

## 2023-07-12 SDOH — ECONOMIC STABILITY: INCOME INSECURITY: IN THE LAST 12 MONTHS, WAS THERE A TIME WHEN YOU WERE NOT ABLE TO PAY THE MORTGAGE OR RENT ON TIME?: PATIENT REFUSED

## 2023-07-12 SDOH — ECONOMIC STABILITY: FOOD INSECURITY: WITHIN THE PAST 12 MONTHS, YOU WORRIED THAT YOUR FOOD WOULD RUN OUT BEFORE YOU GOT MONEY TO BUY MORE.: SOMETIMES TRUE

## 2023-07-12 SDOH — ECONOMIC STABILITY: TRANSPORTATION INSECURITY
IN THE PAST 12 MONTHS, HAS THE LACK OF TRANSPORTATION KEPT YOU FROM MEDICAL APPOINTMENTS OR FROM GETTING MEDICATIONS?: NO

## 2023-07-12 SDOH — ECONOMIC STABILITY: FOOD INSECURITY: WITHIN THE PAST 12 MONTHS, THE FOOD YOU BOUGHT JUST DIDN'T LAST AND YOU DIDN'T HAVE MONEY TO GET MORE.: SOMETIMES TRUE

## 2023-07-12 SDOH — HEALTH STABILITY: PHYSICAL HEALTH: ON AVERAGE, HOW MANY DAYS PER WEEK DO YOU ENGAGE IN MODERATE TO STRENUOUS EXERCISE (LIKE A BRISK WALK)?: 3 DAYS

## 2023-07-12 SDOH — ECONOMIC STABILITY: INCOME INSECURITY: HOW HARD IS IT FOR YOU TO PAY FOR THE VERY BASICS LIKE FOOD, HOUSING, MEDICAL CARE, AND HEATING?: HARD

## 2023-07-12 SDOH — HEALTH STABILITY: PHYSICAL HEALTH: ON AVERAGE, HOW MANY MINUTES DO YOU ENGAGE IN EXERCISE AT THIS LEVEL?: 30 MIN

## 2023-07-12 SDOH — ECONOMIC STABILITY: TRANSPORTATION INSECURITY
IN THE PAST 12 MONTHS, HAS LACK OF TRANSPORTATION KEPT YOU FROM MEETINGS, WORK, OR FROM GETTING THINGS NEEDED FOR DAILY LIVING?: NO

## 2023-07-12 ASSESSMENT — ENCOUNTER SYMPTOMS
ARTHRALGIAS: 0
PALPITATIONS: 0
NERVOUS/ANXIOUS: 0
FREQUENCY: 0
WEAKNESS: 0
HEADACHES: 1
FEVER: 0
JOINT SWELLING: 0
BREAST MASS: 0
DIZZINESS: 0
COUGH: 0
CHILLS: 0
SORE THROAT: 0
NAUSEA: 0
PARESTHESIAS: 0
ABDOMINAL PAIN: 0
HEARTBURN: 0
SHORTNESS OF BREATH: 1
MYALGIAS: 0
EYE PAIN: 0
HEMATURIA: 0
DIARRHEA: 0
CONSTIPATION: 0
HEMATOCHEZIA: 0
DYSURIA: 0

## 2023-07-12 ASSESSMENT — LIFESTYLE VARIABLES
AUDIT-C TOTAL SCORE: -1
HOW OFTEN DO YOU HAVE A DRINK CONTAINING ALCOHOL: NEVER
SKIP TO QUESTIONS 9-10: 0
HOW MANY STANDARD DRINKS CONTAINING ALCOHOL DO YOU HAVE ON A TYPICAL DAY: PATIENT DECLINED
HOW OFTEN DO YOU HAVE SIX OR MORE DRINKS ON ONE OCCASION: PATIENT DECLINED

## 2023-07-12 ASSESSMENT — SOCIAL DETERMINANTS OF HEALTH (SDOH)
HOW OFTEN DO YOU ATTEND CHURCH OR RELIGIOUS SERVICES?: NEVER
HOW OFTEN DO YOU GET TOGETHER WITH FRIENDS OR RELATIVES?: THREE TIMES A WEEK
IN A TYPICAL WEEK, HOW MANY TIMES DO YOU TALK ON THE PHONE WITH FAMILY, FRIENDS, OR NEIGHBORS?: MORE THAN THREE TIMES A WEEK
DO YOU BELONG TO ANY CLUBS OR ORGANIZATIONS SUCH AS CHURCH GROUPS UNIONS, FRATERNAL OR ATHLETIC GROUPS, OR SCHOOL GROUPS?: NO

## 2023-07-12 NOTE — PROGRESS NOTES
"   SUBJECTIVE:   CC: Ethel is an 38 year old who presents for preventive health visit.        No data to display              Healthy Habits:     Getting at least 3 servings of Calcium per day:  Yes    Bi-annual eye exam:  NO    Dental care twice a year:  NO    Sleep apnea or symptoms of sleep apnea:  None    Diet:  Regular (no restrictions)    Frequency of exercise:  None    Taking medications regularly:  Not Applicable    Medication side effects:  None    Additional concerns today:  No  LMP: 6 months ago, stopped OCP due to weight gain.    Amenorrhea: has had this issue since 2018, started after taking a weight loss medication  \"HCG\", she was seen by GYN at the [facility] and started on OCP with return of menses.  She stopped OCP's 6 months ago, is interested in another form of contraception.     Cervical cancer screening:  Last pap 2019, ASCUS, HPV negative.     Today's PHQ-2 Score:       7/12/2023     1:05 PM   PHQ-2 ( 1999 [employer])   Q1: Little interest or pleasure in doing things 0   Q2: Feeling down, depressed or hopeless 1   PHQ-2 Score 1   Q1: Little interest or pleasure in doing things Not at all   Q2: Feeling down, depressed or hopeless Several days   PHQ-2 Score 1             Social History     Tobacco Use     Smoking status: Never     Smokeless tobacco: Never   Substance Use Topics     Alcohol use: No           7/12/2023     1:04 PM   Alcohol Use   Prescreen: >3 drinks/day or >7 drinks/week? Not Applicable     Reviewed orders with patient.  Reviewed health maintenance and updated orders accordingly - Yes  Lab work is in process  BP Readings from Last 3 Encounters:   07/12/23 94/64   02/14/23 100/69   03/12/21 90/64    Wt Readings from Last 3 Encounters:   07/12/23 81.2 kg (179 lb)   02/14/23 83.9 kg (185 lb)   03/12/21 77.6 kg (171 lb)                  Patient Active Problem List   Diagnosis     Early onset menopause     Past Surgical History:   Procedure Laterality Date     LAPAROSCOPIC " CHOLECYSTECTOMY         Social History     Tobacco Use     Smoking status: Never     Smokeless tobacco: Never   Substance Use Topics     Alcohol use: No     Family History   Problem Relation Age of Onset     Hypertension Mother      Diabetes Type 2  Father      Throat cancer Maternal Uncle      Myocardial Infarction No family hx of      Cerebrovascular Disease No family hx of      Coronary Artery Disease Early Onset No family hx of      Breast Cancer No family hx of      Ovarian Cancer No family hx of      Colon Cancer No family hx of          No current outpatient medications on file.       Breast Cancer Screenin/12/2023     1:11 PM   Breast CA Risk Assessment (FHS-7)   Do you have a family history of breast, colon, or ovarian cancer? No / Unknown     No    Patient under 40 years of age: Routine Mammogram Screening not recommended.   Pertinent mammograms are reviewed under the imaging tab.    History of abnormal Pap smear: YES - other categories - see link Cervical Cytology Screening Guidelines      Latest Ref Rng & Units 2019     3:00 PM 2019     2:08 PM 2010    12:00 AM   PAP / HPV   PAP (Historical)   ASC-US  NIL    HPV 16 DNA NEG^Negative Negative      HPV 18 DNA NEG^Negative Negative      Other HR HPV NEG^Negative Negative        Reviewed and updated as needed this visit by clinical staff                  Reviewed and updated as needed this visit by Provider                     Review of Systems   Constitutional: Negative for chills and fever.   HENT: Negative for congestion, ear pain, hearing loss and sore throat.    Eyes: Negative for pain and visual disturbance.   Respiratory: Positive for shortness of breath. Negative for cough.    Cardiovascular: Negative for chest pain, palpitations and peripheral edema.   Gastrointestinal: Negative for abdominal pain, constipation, diarrhea, heartburn, hematochezia and nausea.   Breasts:  Negative for tenderness, breast mass and discharge.  "  Genitourinary: Negative for dysuria, frequency, genital sores, hematuria, pelvic pain, urgency, vaginal bleeding and vaginal discharge.   Musculoskeletal: Negative for arthralgias, joint swelling and myalgias.   Skin: Negative for rash.   Neurological: Positive for headaches. Negative for dizziness, weakness and paresthesias.   Psychiatric/Behavioral: Negative for mood changes. The patient is not nervous/anxious.       OBJECTIVE:   BP 94/64 (Cuff Size: Adult Large)   Pulse 54   Temp 98.1  F (36.7  C) (Oral)   Resp 12   Ht 1.626 m (5' 4\")   Wt 81.2 kg (179 lb)   SpO2 99%   BMI 30.73 kg/m    Physical Exam  GENERAL: healthy, alert and no distress  EYES: Eyes grossly normal to inspection, PERRL and conjunctivae and sclerae normal  HENT: ear canals and TM's normal, nose and mouth without ulcers or lesions  NECK: no adenopathy, no asymmetry, masses, or scars and thyroid normal to palpation  RESP: lungs clear to auscultation - no rales, rhonchi or wheezes  BREAST: normal without masses, tenderness or nipple discharge and no palpable axillary masses or adenopathy  CV: regular rate and rhythm, normal S1 S2, no S3 or S4, no murmur, click or rub, no peripheral edema and peripheral pulses strong  ABDOMEN: soft, nontender, no hepatosplenomegaly, no masses and bowel sounds normal   (female): normal female external genitalia, normal urethral meatus, vaginal mucosa pink, moist, well rugated, and normal cervix/adnexa/uterus without masses or discharge  MS: no gross musculoskeletal defects noted, no edema  SKIN: no suspicious lesions or rashes  NEURO: Normal strength and tone, mentation intact and speech normal  PSYCH: mentation appears normal, affect normal/bright        ASSESSMENT/PLAN:   Ethel was seen today for physical.    Diagnoses and all orders for this visit:    Routine general medical examination at a health care facility  -     CBC with Platelets & Differential; Future  -     Comprehensive metabolic panel; " Future  -     Lipid panel reflex to direct LDL Fasting; Future  -     TSH with free T4 reflex; Future  -     Vitamin D Deficiency; Future  -     Hemoglobin A1c; Future    ASCUS of cervix with negative high risk HPV  -     Pap diagnostic with HPV    Amenorrhea:  Has been on OCP in the past, stopped taking at least 6 months ago, does not want to take any longer due to weight gain.  Will refer to GYN to discuss other options.    -     Ob/Gyn Referral; Future    Other orders  -     REVIEW OF HEALTH MAINTENANCE PROTOCOL ORDERS      COUNSELING:  Reviewed preventive health counseling, as reflected in patient instructions       Regular exercise       Healthy diet/nutrition        She reports that she has never smoked. She has never used smokeless tobacco.    Susan Haase, APRN CNP  [facility]

## 2023-07-19 LAB
BKR LAB AP GYN ADEQUACY: ABNORMAL
BKR LAB AP GYN INTERPRETATION: ABNORMAL
BKR LAB AP HPV REFLEX: ABNORMAL
BKR LAB AP LMP: ABNORMAL
BKR LAB AP PREVIOUS ABNL DX: ABNORMAL
BKR LAB AP PREVIOUS ABNORMAL: ABNORMAL
PATH REPORT.COMMENTS IMP SPEC: ABNORMAL
PATH REPORT.COMMENTS IMP SPEC: ABNORMAL
PATH REPORT.RELEVANT HX SPEC: ABNORMAL

## 2023-07-20 ENCOUNTER — PATIENT OUTREACH (OUTPATIENT)
Dept: FAMILY MEDICINE | Facility: CLINIC | Age: 38
End: 2023-07-20

## 2023-07-20 PROBLEM — R87.610 ASCUS OF CERVIX WITH NEGATIVE HIGH RISK HPV: Status: RESOLVED | Noted: 2019-08-01 | Resolved: 2022-10-25

## 2023-07-20 LAB
HUMAN PAPILLOMA VIRUS 16 DNA: NEGATIVE
HUMAN PAPILLOMA VIRUS 18 DNA: NEGATIVE
HUMAN PAPILLOMA VIRUS FINAL DIAGNOSIS: NORMAL
HUMAN PAPILLOMA VIRUS OTHER HR: NEGATIVE

## 2023-09-29 NOTE — TELEPHONE ENCOUNTER
Diagnosis, Referred by & from: External Hemorrhoids   Appt date: 12/12/2023   NOTES STATUS DETAILS   OFFICE NOTE from referring provider Internal [facility]:  2/14/23 - UC OV with Dr. Estrada   OFFICE NOTE from other specialist Internal [facility]:  7/12/23 - PCC OV with Susan Haase, NP    [facility]:  9/15/20 - PCC OV with Dr. Hernandez  6/26/20 - UC OV with Dr. Hunter   DISCHARGE SUMMARY from hospital N/A    DISCHARGE REPORT from the ER Internal [facility]:  7/1/18 - ED OV with Dr. Reina   OPERATIVE REPORT N/A    MEDICATION LIST Internal    LABS N/A    DIAGNOSTIC PROCEDURES N/A    IMAGING (DISC & REPORT)      CT Internal [facility]:  6/17/16 - CT Abd/Pelvis   ULTRASOUND  (ENDOANAL/ENDORECTAL) Internal [facility]:  4/26/18 - US Pelvic      See Attending Note below

## 2023-12-12 ENCOUNTER — PRE VISIT (OUTPATIENT)
Dept: SURGERY | Facility: CLINIC | Age: 38
End: 2023-12-12

## 2024-03-13 ENCOUNTER — TELEPHONE (OUTPATIENT)
Dept: FAMILY MEDICINE | Facility: CLINIC | Age: 39
End: 2024-03-13

## 2024-03-13 ENCOUNTER — OFFICE VISIT (OUTPATIENT)
Dept: FAMILY MEDICINE | Facility: CLINIC | Age: 39
End: 2024-03-13
Payer: COMMERCIAL

## 2024-03-13 VITALS
TEMPERATURE: 97.6 F | OXYGEN SATURATION: 98 % | DIASTOLIC BLOOD PRESSURE: 60 MMHG | RESPIRATION RATE: 16 BRPM | HEART RATE: 64 BPM | SYSTOLIC BLOOD PRESSURE: 90 MMHG | WEIGHT: 192.6 LBS | BODY MASS INDEX: 33.06 KG/M2

## 2024-03-13 DIAGNOSIS — L65.9 HAIR LOSS: ICD-10-CM

## 2024-03-13 DIAGNOSIS — H61.23 BILATERAL IMPACTED CERUMEN: ICD-10-CM

## 2024-03-13 DIAGNOSIS — E28.319 EARLY ONSET MENOPAUSE: ICD-10-CM

## 2024-03-13 DIAGNOSIS — N91.2 AMENORRHEA: Primary | ICD-10-CM

## 2024-03-13 LAB
FOLATE SERPL-MCNC: 26.1 NG/ML (ref 4.6–34.8)
HCG UR QL: NEGATIVE
HGB BLD-MCNC: 14.4 G/DL (ref 11.7–15.7)

## 2024-03-13 PROCEDURE — 82607 VITAMIN B-12: CPT | Performed by: FAMILY MEDICINE

## 2024-03-13 PROCEDURE — 81025 URINE PREGNANCY TEST: CPT | Performed by: FAMILY MEDICINE

## 2024-03-13 PROCEDURE — 85018 HEMOGLOBIN: CPT | Performed by: FAMILY MEDICINE

## 2024-03-13 PROCEDURE — 82728 ASSAY OF FERRITIN: CPT | Performed by: FAMILY MEDICINE

## 2024-03-13 PROCEDURE — 82746 ASSAY OF FOLIC ACID SERUM: CPT | Performed by: FAMILY MEDICINE

## 2024-03-13 PROCEDURE — 99214 OFFICE O/P EST MOD 30 MIN: CPT | Mod: 25 | Performed by: FAMILY MEDICINE

## 2024-03-13 PROCEDURE — 84443 ASSAY THYROID STIM HORMONE: CPT | Performed by: FAMILY MEDICINE

## 2024-03-13 PROCEDURE — 82306 VITAMIN D 25 HYDROXY: CPT | Performed by: FAMILY MEDICINE

## 2024-03-13 PROCEDURE — 36415 COLL VENOUS BLD VENIPUNCTURE: CPT | Performed by: FAMILY MEDICINE

## 2024-03-13 PROCEDURE — 69210 REMOVE IMPACTED EAR WAX UNI: CPT | Performed by: FAMILY MEDICINE

## 2024-03-13 RX ORDER — NORETHINDRONE ACETATE AND ETHINYL ESTRADIOL 1MG-20(21)
1 KIT ORAL DAILY
Qty: 84 TABLET | Refills: 1 | Status: SHIPPED | OUTPATIENT
Start: 2024-03-13

## 2024-03-13 RX ORDER — MEDROXYPROGESTERONE ACETATE 10 MG
10 TABLET ORAL DAILY
Qty: 10 TABLET | Refills: 0 | Status: SHIPPED | OUTPATIENT
Start: 2024-03-13 | End: 2024-03-23

## 2024-03-13 ASSESSMENT — PAIN SCALES - GENERAL: PAINLEVEL: NO PAIN (0)

## 2024-03-13 NOTE — TELEPHONE ENCOUNTER
Spoke to patient. She states she does not need an . She was upset that we called her at work.     Patient was unable to talk. Unable to give the patient her test results.       Berta Bowman RN  [facility]

## 2024-03-13 NOTE — TELEPHONE ENCOUNTER
----- Message from Merrick Julian MD sent at 3/13/2024  4:01 PM CDT -----  Please call the patient to notify patient that the urine pregnancy test is negative.  I would like her to use Provera 10 mg daily for 10 days.  Once her menstruation starts, start OCPs on day number one of her menstrual flow.  This was reviewed with her during the visit.  I was waiting for the negative pregnancy test results.        Merrick Julian MD

## 2024-03-13 NOTE — LETTER
March 25, 2024      Ethel hCi  [address] [place] [address]        Dear ,    All your blood tests came back within normal range    Resulted Orders   TSH with free T4 reflex   Result Value Ref Range    TSH 1.15 0.30 - 4.20 uIU/mL   HCG qualitative urine   Result Value Ref Range    hCG Urine Qualitative Negative Negative      Comment:      This test is for screening purposes.  Results should be interpreted along with the clinical picture.  Confirmation testing is available if warranted by ordering IBI269, HCG Quantitative Pregnancy.   Vitamin D Deficiency   Result Value Ref Range    Vitamin D, Total (25-Hydroxy) 25 20 - 50 ng/mL      Comment:      optimum levels    Narrative    Season, race, dietary intake, and treatment affect the concentration of 25-hydroxy-Vitamin D. Values may decrease during winter months and increase during summer months.    Vitamin D determination is routinely performed by an immunoassay specific for 25 hydroxyvitamin D3.  If an individual is on vitamin D2(ergocalciferol) supplementation, please specify 25 OH vitamin D2 and D3 level determination by LCMSMS test VITD23.     Vitamin B12   Result Value Ref Range    Vitamin B12 395 232 - 1,245 pg/mL   Ferritin   Result Value Ref Range    Ferritin 124 6 - 175 ng/mL   Folate   Result Value Ref Range    Folic Acid 26.1 4.6 - 34.8 ng/mL   Hemoglobin   Result Value Ref Range    Hemoglobin 14.4 11.7 - 15.7 g/dL       If you have any questions or concerns, please call the clinic at the number listed above.       Sincerely,      Merrick Julian MD

## 2024-03-13 NOTE — RESULT ENCOUNTER NOTE
Please call the patient to notify patient that the urine pregnancy test is negative.  I would like her to use Provera 10 mg daily for 10 days.  Once her menstruation starts, start OCPs on day number one of her menstrual flow.  This was reviewed with her during the visit.  I was waiting for the negative pregnancy test results.        Merrick Julian MD

## 2024-03-13 NOTE — PROGRESS NOTES
"  Assessment & Plan     Amenorrhea  Patient has had amenorrhea off-and-on in the past.  She has been on a birth control pill.  For the last 9 months she has not used any.  She is not currently sexually active.  No menses in the last 9 months from what she recalls.      If pregnancy test is negative, I will go ahead prescribe Provera for 10 days.  Once her menses started, she is instructed to start OCPs.  Await the results.  Patient verbalized understanding and agreement  - TSH with free T4 reflex; Future  - HCG qualitative urine; Future  - TSH with free T4 reflex  - HCG qualitative urine    Hair loss  Questionable etiology.  Labs ordered as below to investigate further    - Vitamin D Deficiency  - Vitamin B12  - Ferritin  - Folate  - Hemoglobin    Bilateral impacted cerumen  Patient has bilateral impacted cerumen which could be causing the ringing sensation in the ear.  Ear wash ordered to be done by the MA.    - REMOVE IMPACTED CERUMEN            BMI  Estimated body mass index is 33.06 kg/m  as calculated from the following:    Height as of 7/12/23: 1.626 m (5' 4\").    Weight as of this encounter: 87.4 kg (192 lb 9.6 oz).             Deng Davenport is a 39 year old, presenting for the following health issues:  Medication Request        3/13/2024     1:27 PM   Additional Questions   Roomed by Ariana HERNANDEZ   Accompanied by Daughter-Pablito     History of Present Illness       Reason for visit:  Med follow up    She eats 2-3 servings of fruits and vegetables daily.She consumes 1 sweetened beverage(s) daily.She exercises with enough effort to increase her heart rate 30 to 60 minutes per day.  She exercises with enough effort to increase her heart rate 5 days per week.   She is taking medications regularly.         Ringing in ear-Right x 4 weeks        Review of Systems  CONSTITUTIONAL: NEGATIVE for fever, chills, change in weight  ENT/MOUTH: NEGATIVE for ear, mouth and throat problems  RESP: NEGATIVE for significant cough " or SOB  CV: NEGATIVE for chest pain, palpitations or peripheral edema      Objective    BP 90/60   Pulse 64   Temp 97.6  F (36.4  C) (Temporal)   Resp 16   Wt 87.4 kg (192 lb 9.6 oz)   LMP  (LMP Unknown)   SpO2 98%   BMI 33.06 kg/m    Body mass index is 33.06 kg/m .  Physical Exam   GENERAL: alert and no distress  HENT: ear canals and TM's normal, nose and mouth without ulcers or lesions  NECK: no adenopathy, no asymmetry, masses, or scars  RESP: lungs clear to auscultation - no rales, rhonchi or wheezes  CV: regular rate and rhythm, normal S1 S2, no S3 or S4, no murmur, click or rub, no peripheral edema  ABDOMEN: soft, nontender, no hepatosplenomegaly, no masses and bowel sounds normal  MS: no gross musculoskeletal defects noted, no edema        Signed Electronically by: Merrick Julian MD

## 2024-03-13 NOTE — LETTER
March 15, 2024      Ethel Chi  [address] [address]        Dear ,    We are writing to inform you of your test results, we have been unable to reach you by phone.  Urine pregnancy test is negative.  I would like you to use Provera 10 mg daily for 10 days.  Once your menstruation starts, start oral contraceptive pills on day number one of your menstrual flow.  This was reviewed with you during the visit.            Resulted Orders   HCG qualitative urine   Result Value Ref Range    hCG Urine Qualitative Negative Negative      Comment:      This test is for screening purposes.  Results should be interpreted along with the clinical picture.  Confirmation testing is available if warranted by ordering [id number], HCG Quantitative Pregnancy.   Hemoglobin   Result Value Ref Range    Hemoglobin 14.4 11.7 - 15.7 g/dL       If you have any questions or concerns, please call the clinic at the number listed above.       Sincerely,  Dr Julian

## 2024-03-14 LAB
FERRITIN SERPL-MCNC: 124 NG/ML (ref 6–175)
TSH SERPL DL<=0.005 MIU/L-ACNC: 1.15 UIU/ML (ref 0.3–4.2)
VIT B12 SERPL-MCNC: 395 PG/ML (ref 232–1245)
VIT D+METAB SERPL-MCNC: 25 NG/ML (ref 20–50)

## 2024-03-14 NOTE — TELEPHONE ENCOUNTER
Patient Contact     Attempt # 2     Was call answered?    No-mailbox is full      On call back: Relay providers message below.    Madeleine Wild RN

## 2024-03-15 NOTE — TELEPHONE ENCOUNTER
Call attempt #3.     No answer on phone.     Will have the teams send the patient a letter.     Berta Bowman RN  [facility]

## 2024-04-24 ENCOUNTER — NURSE TRIAGE (OUTPATIENT)
Dept: INTERNAL MEDICINE | Facility: CLINIC | Age: 39
End: 2024-04-24
Payer: COMMERCIAL

## 2024-04-24 NOTE — TELEPHONE ENCOUNTER
Recommend ER evaluation.     ---    I have not seen patient in over 3 years and no-showed multiple times.     She will need to reestablish care with a new provider as I am no longer taking patients.

## 2024-04-24 NOTE — TELEPHONE ENCOUNTER
Spoke to the daughter and she will take her mother to the ER per Dr. Hernandez. Daughter will take her to [facility].       Berta Bowman RN  [facility]

## 2024-04-24 NOTE — TELEPHONE ENCOUNTER
"María, patient's daughter calling.No CTC on file, verbal obtained from patient.    Nurse Triage SBAR    Is this a 2nd Level Triage? YES, LICENSED PRACTITIONER REVIEW IS REQUIRED    Situation: recent fall; head bruise, leg injury/bump    Background: Patient fell a week ago while at work. She tripped over a bucket on the ground and fell face first onto a \"very hard surface\". Was seen by work nurse after fall but was not evaluated by provider/UC/ED. States she did hit her head- \"everything was bruised and her mouth was bleeding when it happened'.   Patient is not on blood thinners.     Assessment: Main concerns: bruise on head, fist sized bump on leg.     Head bruise- Bruise is about 1 inch in size- purple and green. Denies current black eyes. States she occasionally has headaches at work when she has to turn her head a lot. 5/10 pain, intermittent. Improved with warm compresses to back of the head.  Patient and daughter refuse confusion, vision changes, gait changes, open cuts/scrapes, vomiting    Leg bump- fist sized bump on R lower leg- mid inside calf. 5/10 intermittent pain when touched. Has been using tylenol and ice. Purple and green bruising. Denies hot to touch. Denies fever. States the bump is decreasing in size slowly. Pain is worse when going up stairs. No open cuts/scrapes.       Protocol Recommended Disposition:   See in Office Today, See in Office Today or Tomorrow    Recommendation: RN advised patient to go to urgent care to be evaluated. Patient states she works tonight from 4pm-3:30am. Refused disposition. She is wondering if she can have an appointment on Friday. (Could do late 5pm Thursday appt but has a meeting with daughter).  Patient states she cannot miss any more work and is afraid she will be fired if she misses.     TRIAGE- please call [phone] with 2nd level response    Routed to provider    Does the patient meet one of the following criteria for ADS visit consideration? 16+ years old, " with an [facility] PCP     TIP  Providers, please consider if this condition is appropriate for management at one of our Acute and Diagnostic Services sites.     If patient is a good candidate, please use dotphrase <dot>triageresponse and select Refer to ADS to document.    Jane HERNANDEZ RN on 4/24/2024 at 4:03 PM         Reason for Disposition   After 3 days and headache persists   Swelling is painful to touch and no fever   Large swelling or bruise and size > palm of person's hand    Additional Information   Negative: Major bleeding (actively dripping or spurting) that can't be stopped   Negative: Bullet, stabbed by knife, or other serious penetrating wound   Negative: Looks like a dislocated joint (crooked or deformed)   Negative: Can't stand (bear weight) or walk   Negative: Serious injury with multiple fractures (broken bones)   Negative: Sounds like a life-threatening emergency to the triager   Negative: Wound looks infected   Negative: Leg pain from overuse (e.g., sports, running, physical work)   Negative: Leg pain not from an injury   Negative: Hip injury is main concern   Negative: Knee injury is main concern   Negative: Foot or ankle injury is main concern   Negative: SEVERE pain (e.g., excruciating pain, unable to do any normal activities)   Negative: Skin is split open or gaping (or length > 1/2 inch or 12 mm)   Negative: Bleeding won't stop after 10 minutes of direct pressure (using correct technique)   Negative: Dirt in the wound and not removed with 15 minutes of scrubbing   Negative: Sounds like a serious injury to the triager   Negative: Looks infected (e.g., spreading redness, red streak, pus)   Negative: ACUTE NEUROLOGIC SYMPTOM and symptom present now   Negative: Knocked out (unconscious) > 1 minute   Negative: Seizure (convulsion) occurred  (Exception: Prior history of seizures and now alert and without Acute Neurologic Symptoms.)   Negative: Neck pain after dangerous injury (e.g., MVA, diving,  trampoline, contact sports, fall > 10 feet or 3 meters)  (Exception: Neck pain began > 1 hour after injury.)   Negative: Major bleeding (actively dripping or spurting) that can't be stopped   Negative: Penetrating head injury (e.g., knife, gunshot wound, metal object)   Negative: Sounds like a life-threatening emergency to the triager   Negative: Diagnosed with a concussion within last 14 days   Negative: Can't remember what happened (amnesia)   Negative: Vomiting once or more   Negative: Watery or blood-tinged fluid dripping from the nose or ears   Negative: ACUTE NEUROLOGIC SYMPTOM and now fine   Negative: Knocked out (unconscious) < 1 minute and now fine   Negative: SEVERE headache   Negative: Dangerous injury (e.g., MVA, diving, trampoline, contact sports, fall > 10 feet or 3 meters) or severe blow from hard object (e.g., golf club or baseball bat)   Negative: Large swelling or bruise (> 2 inches or 5 cm)   Negative: Skin is split open or gaping (length > 1/2 inch or 12 mm)   Negative: Bleeding won't stop after 10 minutes of direct pressure (using correct technique)   Negative: One or two 'black eyes' (bruising, purple color of eyelids), and onset within 24 hours of head injury   Negative: Taking Coumadin (warfarin) or other strong blood thinner, or known bleeding disorder (e.g., thrombocytopenia)   Negative: Age over 65 years with an area of head swelling or bruise   Negative: Sounds like a serious injury to the triager   Negative: Patient is confused or is an unreliable provider of information (e.g., dementia, severe intellectual disability, alcohol intoxication)   Negative: No prior tetanus shots (or is not fully vaccinated) and any wound (e.g., cut or scrape)   Negative: HIV positive or severe immunodeficiency (severely weak immune system) and DIRTY cut or scrape   Negative: Patient wants to be seen   Negative: No prior tetanus shots (or is not fully vaccinated) and any wound (e.g., cut or scrape)    Negative: HIV positive or severe immunodeficiency (severely weak immune system) and DIRTY cut or scrape   Negative: Patient wants to be seen   Negative: Small growth, spot, bump, or pigmented area of skin (e.g., moles, skin tags, wart, melanoma, skin cancer)   Negative: Inguinal hernia previously diagnosed by a doctor (or NP/PA)   Negative: Followed a skin injury   Negative: Followed an insect bite   Negative: Swelling of ankle joint   Negative: Swelling of entire face   Negative: Swelling of eyelid   Negative: Swelling of knee joint   Negative: Swelling of labia   Negative: Swelling of lymph node suspected   Negative: Swelling of rectum   Negative: Swelling of scrotum   Negative: Swelling of surgical incision   Negative: Swelling of vaccination site   Negative: Hernia suspected (bulge in groin or abdomen) and painful or vomiting   Negative: Swollen lump in groin and pulsating (like heartbeat)   Negative: Patient sounds very sick or weak to the triager   Negative: SEVERE pain (e.g., excruciating)   Negative: Swelling is painful to touch AND fever   Negative: Swelling is red and fever   Negative: Swelling is red and size > 2 inches (5.0 cm)    Protocols used: Leg Injury-A-OH, Head Injury-A-OH, Skin Lump or Localized Swelling-A-OH

## 2024-04-27 ENCOUNTER — APPOINTMENT (OUTPATIENT)
Dept: GENERAL RADIOLOGY | Facility: CLINIC | Age: 39
End: 2024-04-27
Attending: PHYSICIAN ASSISTANT
Payer: OTHER MISCELLANEOUS

## 2024-04-27 ENCOUNTER — APPOINTMENT (OUTPATIENT)
Dept: CT IMAGING | Facility: CLINIC | Age: 39
End: 2024-04-27
Attending: PHYSICIAN ASSISTANT
Payer: OTHER MISCELLANEOUS

## 2024-04-27 ENCOUNTER — HOSPITAL ENCOUNTER (EMERGENCY)
Facility: CLINIC | Age: 39
Discharge: HOME | End: 2024-04-27
Attending: PHYSICIAN ASSISTANT | Admitting: PHYSICIAN ASSISTANT
Payer: OTHER MISCELLANEOUS

## 2024-04-27 VITALS
OXYGEN SATURATION: 99 % | DIASTOLIC BLOOD PRESSURE: 69 MMHG | WEIGHT: 192.9 LBS | HEIGHT: 60 IN | TEMPERATURE: 97.5 F | BODY MASS INDEX: 37.87 KG/M2 | SYSTOLIC BLOOD PRESSURE: 108 MMHG | RESPIRATION RATE: 18 BRPM | HEART RATE: 61 BPM

## 2024-04-27 DIAGNOSIS — S09.90XA HEAD INJURY, INITIAL ENCOUNTER: ICD-10-CM

## 2024-04-27 DIAGNOSIS — S80.01XA CONTUSION OF RIGHT KNEE AND LOWER LEG, INITIAL ENCOUNTER: ICD-10-CM

## 2024-04-27 DIAGNOSIS — S80.11XA CONTUSION OF RIGHT KNEE AND LOWER LEG, INITIAL ENCOUNTER: ICD-10-CM

## 2024-04-27 PROCEDURE — 70486 CT MAXILLOFACIAL W/O DYE: CPT

## 2024-04-27 PROCEDURE — 99284 EMERGENCY DEPT VISIT MOD MDM: CPT | Mod: 25

## 2024-04-27 PROCEDURE — 73590 X-RAY EXAM OF LOWER LEG: CPT | Mod: RT

## 2024-04-27 PROCEDURE — 73562 X-RAY EXAM OF KNEE 3: CPT | Mod: 50

## 2024-04-27 ASSESSMENT — COLUMBIA-SUICIDE SEVERITY RATING SCALE - C-SSRS
1. IN THE PAST MONTH, HAVE YOU WISHED YOU WERE DEAD OR WISHED YOU COULD GO TO SLEEP AND NOT WAKE UP?: NO
6. HAVE YOU EVER DONE ANYTHING, STARTED TO DO ANYTHING, OR PREPARED TO DO ANYTHING TO END YOUR LIFE?: NO
2. HAVE YOU ACTUALLY HAD ANY THOUGHTS OF KILLING YOURSELF IN THE PAST MONTH?: NO

## 2024-04-27 ASSESSMENT — ACTIVITIES OF DAILY LIVING (ADL): ADLS_ACUITY_SCORE: 35

## 2024-04-27 NOTE — Clinical Note
Ethel Chi was seen and treated in our emergency department on 4/27/2024.  She may return to work on 05/08/2024.       If you have any questions or concerns, please don't hesitate to call.      Blair Babb PA-C

## 2024-04-27 NOTE — ED TRIAGE NOTES
Pt states that she fell at work and both knees are bruised and swollen.  Her job at [employer] wanted her to come in to get her knees checked for fracture and to get her face looked at as she fell face first.  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  The initial fall was 2 weeks ago.     Triage Assessment (Adult)       Row Name 04/27/24 4577          Triage Assessment    Airway WDL WDL        Respiratory WDL    Respiratory WDL WDL        Cardiac WDL    Cardiac WDL WDL

## 2024-04-28 NOTE — ED PROVIDER NOTES
History     Chief Complaint:  Fall       HPI   Ethel Chi is a 39 year old female who presents for evaluation after a fall. 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 She endorses bruising and edema in her knees as well as pain along her right leg, especially her knee. Patient reports that following the incident she experienced dizziness but did no loss of consciousness or subsequent vomiting.  Patient does not endorse any significant headache at this time.  Patient is not anticoagulated. She denies any chance of pregnancy.      Independent Historian:   None - Patient Only    Review of External Notes:        Medications:    Maikel     Past Medical History:    Early onset menopause    Past Surgical History:    Laparoscopic cholecystectomy     Physical Exam   Patient Vitals for the past 24 hrs:   BP Temp Pulse Resp SpO2 Height Weight   04/27/24 2052 108/69 -- 61 18 99 % -- --   04/27/24 1816 133/62 97.5  F (36.4  C) 71 17 100 % 1.524 m (5') 87.5 kg (192 lb 14.4 oz)        Physical Exam  Vitals and nursing note reviewed.   Constitutional:       Appearance: She is not diaphoretic.   HENT:      Head: No raccoon eyes, Dickens's sign, right periorbital erythema or left periorbital erythema.      Jaw: Tenderness present.        Nose: Nose normal.      Mouth/Throat:      Lips: Pink.      Mouth: Mucous membranes are moist.      Pharynx: Oropharynx is clear. No oropharyngeal exudate or posterior oropharyngeal erythema.   Eyes:      General: No scleral icterus.        Right eye: No discharge.         Left eye: No discharge.      Extraocular Movements: Extraocular movements intact.      Conjunctiva/sclera: Conjunctivae normal.      Pupils: Pupils are equal, round, and reactive to light.   Cardiovascular:      Rate and Rhythm: Normal rate and regular rhythm.      Heart sounds: Normal heart sounds. No murmur heard.     No friction rub. No  gallop.   Pulmonary:      Effort: Pulmonary effort is normal.      Breath sounds: Normal breath sounds.   Musculoskeletal:      Cervical back: Normal range of motion and neck supple. No tenderness. No spinous process tenderness or muscular tenderness. Normal range of motion.      Right hip: No deformity. Normal range of motion. Normal strength.      Left hip: No deformity. Normal range of motion. Normal strength.      Right upper leg: No swelling, edema or deformity.      Left upper leg: No swelling, edema or deformity.      Right knee: No swelling, deformity or ecchymosis. Normal range of motion. Tenderness present. No LCL laxity, MCL laxity, ACL laxity or PCL laxity.      Left knee: No swelling, deformity or ecchymosis. Normal range of motion. Tenderness present. No LCL laxity, MCL laxity, ACL laxity or PCL laxity.     Right lower leg: Swelling (small area of proximal anterior swelling and buirsing) and tenderness present.      Left lower leg: No swelling or tenderness.      Right ankle: No swelling or deformity. Normal range of motion.      Left ankle: No swelling or deformity. Normal range of motion.      Right foot: Normal range of motion. No swelling or deformity. Normal pulse.      Left foot: Normal range of motion. No swelling or deformity. Normal pulse.        Legs:    Neurological:      Mental Status: She is alert and oriented to person, place, and time. Mental status is at baseline.      GCS: GCS eye subscore is 4. GCS verbal subscore is 5. GCS motor subscore is 6.      Cranial Nerves: No cranial nerve deficit, dysarthria or facial asymmetry.      Sensory: Sensation is intact.      Motor: Motor function is intact. No weakness or pronator drift.      Coordination: Coordination is intact. Romberg sign negative. Finger-Nose-Finger Test normal.      Gait: Gait is intact. Gait normal.         Emergency Department Course   ECG:  ECG results from 09/29/18   EKG 12 lead     Value    Interpretation ECG Click View  Image link to view waveform and result       Imaging:  XR Tibia and Fibula Right 2 Views   Final Result   IMPRESSION: Normal tibia and fibula. No fracture or joint malalignment.      XR Knee Bilateral 3 Views   Final Result   IMPRESSION:    RIGHT KNEE: No fracture or dislocation. No significant knee joint effusion. Patella devante. Maintained joint spacing throughout the knee.      LEFT KNEE: No fracture or dislocation. No significant knee joint effusion. Patella devante. Maintained joint spacing throughout the knee.       CT Facial Bones without Contrast   Final Result   IMPRESSION:    1.  No acute displaced facial bone fracture or posttraumatic malalignment. No fracture of the skull base/upper cervical level.      2.  No significant swelling of the facial soft tissues.      3.  No acute process intracranially.      4.  Orbital contents are satisfactory.      5.  Please see above for additional details and description.           Laboratory:  Labs Ordered and Resulted from Time of ED Arrival to Time of ED Departure - No data to display     Emergency Department Course & Assessments:    Interventions:  Medications - No data to display     Independent Interpretation (X-rays, CTs, rhythm strip):  Bilateral Knee XR: no fracture or dislocation    Assessments/Consultations/Discussion of Management or Tests:  1938 I obtained patient history and performed a physical exam.     Social Determinants of Health affecting care:   None    Disposition:  The patient was discharged to home.     Impression & Plan    CMS Diagnoses: None      Medical Decision Making:  Ethel Chi is a 39 year old female who presents for evaluation of closed head injury. History and exam are most consistent with concussion. The differential diagnosis also includes skull fracture and various types of intracranial hemorrhage (e.g., epidural hematoma, subdural hematoma). The patient does not take blood thinners.  CT imaging of her face shows no facial  fractures.     I have discussed the risk/benefit analysis with the patient and family regarding CT imaging.  We have decided to hold off on imaging of her head at this time.  Patient has low Robinson CT head score.  X-ray imaging of her knees was also negative for fracture.  She and her family understand return is required for worsening headache, vomiting, confusion, and other concerning symptoms. I provided information regarding on head injury in writing upon discharge. We discussed the second impact syndrome. We discussed the importance of not sustaining a concussion while still symptomatic. I advised that some concussions can be associated with long-lasting symptoms (post-concussive syndrome). I recommended primary care follow up in 2-3 days for recheck, and return precautions as above.       Diagnosis:    ICD-10-CM    1. Contusion of right knee and lower leg, initial encounter  S80.01XA     S80.11XA       2. Head injury, initial encounter  S09.90XA            Discharge Medications:  Discharge Medication List as of 4/27/2024  8:48 PM           Scribe Disclosure:  I, Lilia Manriquez, am serving as a scribe at 7:46 PM on 4/27/2024 to document services personally performed by Blair Babb PA-C based on my observations and the provider's statements to me.     4/27/2024   Blair Babb PA-C, Jacob C, PA-C  04/28/24 1022

## 2024-05-01 ENCOUNTER — OFFICE VISIT (OUTPATIENT)
Dept: FAMILY MEDICINE | Facility: CLINIC | Age: 39
End: 2024-05-01
Payer: OTHER MISCELLANEOUS

## 2024-05-01 VITALS
SYSTOLIC BLOOD PRESSURE: 100 MMHG | WEIGHT: 189.6 LBS | HEART RATE: 68 BPM | HEIGHT: 64 IN | BODY MASS INDEX: 32.37 KG/M2 | RESPIRATION RATE: 16 BRPM | OXYGEN SATURATION: 100 % | TEMPERATURE: 97.6 F | DIASTOLIC BLOOD PRESSURE: 70 MMHG

## 2024-05-01 DIAGNOSIS — S09.90XD INJURY OF HEAD, SUBSEQUENT ENCOUNTER: Primary | ICD-10-CM

## 2024-05-01 PROCEDURE — 99213 OFFICE O/P EST LOW 20 MIN: CPT | Performed by: PHYSICIAN ASSISTANT

## 2024-05-01 RX ORDER — PHENTERMINE HYDROCHLORIDE 15 MG/1
CAPSULE ORAL EVERY MORNING
COMMUNITY

## 2024-05-01 RX ORDER — NORETHINDRONE ACETATE AND ETHINYL ESTRADIOL 1MG-20(21)
1 KIT ORAL DAILY
Qty: 84 TABLET | Refills: 1 | Status: CANCELLED | OUTPATIENT
Start: 2024-05-01

## 2024-05-01 ASSESSMENT — PAIN SCALES - GENERAL: PAINLEVEL: SEVERE PAIN (7)

## 2024-05-01 NOTE — PROGRESS NOTES
"  Assessment & Plan     Injury of head, subsequent encounter  Continued headaches with light sensitivity and intermittently associated nausea since head injury on 4/9. Has continued to work which further exacerbates her symptoms. We discussed brain rest/no work until evaluated by concussion clinic - referral placed today. Work comp paperwork completed for patient.   - Concussion  Referral    MED REC REQUIRED  Post Medication Reconciliation Status:  Discharge medications reconciled, continue medications without change    Aiyana Proctor PA-C on 5/1/2024 at 2:09 PM      Deng Davenport is a 39 year old, presenting for the following health issues:  ER F/U (Work related fall, mild concussion. ), Referral (To dermatology. ), and Forms (Work. )        5/1/2024     1:48 PM   Additional Questions   Roomed by Dottie KNic   Accompanied by Daughter         5/1/2024   Forms   Any forms needing to be completed Yes     HPI     ED/UC Followup:    Facility:  [facility]  Date of visit: 4/27/2024   Reason for visit: fall  Current Status: Worsening     Fall occurred on 4/9 around midnight/1 AM, did not go to work 4/9 night shift or 4/10. 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  Physical Exam   GENERAL: alert and no distress  EYES: Eyes grossly normal to inspection, PERRL and conjunctivae and sclerae normal  HENT: ear canals and TM's normal, nose and mouth without ulcers or lesions  NECK: no adenopathy, no asymmetry, masses, or scars  MS: tenderness bilateral cervical paraspinal muscles  SKIN: no suspicious lesions or rashes  NEURO: Normal strength and tone, mentation intact and speech normal  PSYCH: mentation appears normal, affect normal/bright        Signed Electronically by: Aiyana Proctor PA-C on 5/1/2024 at 2:07 PM

## 2024-05-02 ENCOUNTER — TELEPHONE (OUTPATIENT)
Dept: FAMILY MEDICINE | Facility: CLINIC | Age: 39
End: 2024-05-02
Payer: COMMERCIAL

## 2024-05-02 NOTE — TELEPHONE ENCOUNTER
Order/Referral Request    Who is requesting: Arti Davenport     Orders being requested:     Pt. needs paper copy of referral form the pt. Received for a concussion clinic on 05/01 from [facility].    Doctor told pt. Not to to attend work for 1-2 weeks. Pt. needs paper proof of documentation for WC adjustor.     Juancarlos Osborne (WC claim - Adjustor)    Reason service is needed/diagnosis: WC claim     When are orders needed by:  ASAP 05/02 or 05/03     Has this been discussed with Provider: No    Does patient have a preference on a Group/Provider/Facility?     [facility]     Does patient have an appointment scheduled?: No    Where to send orders: Fax    Pablito [email or web address]  [phone]      Okay to leave a detailed message?: Yes at Cell number on file:    Telephone Information:   Mobile [phone]

## 2024-05-02 NOTE — TELEPHONE ENCOUNTER
Form that was completed yesterday should have been faxed at that time, but I am unsure what number it was faxed to.     Referral to concussion clinic has been done - please find out what is needed. She should get a phone call to schedule otherwise can call [phone] to schedule.     Aiyana Proctor PA-C on 5/2/2024 at 3:50 PM

## 2024-05-03 ENCOUNTER — APPOINTMENT (OUTPATIENT)
Dept: INTERPRETER SERVICES | Facility: CLINIC | Age: 39
End: 2024-05-03
Payer: COMMERCIAL

## 2024-05-03 NOTE — TELEPHONE ENCOUNTER
Through [employer]  SIENNA WILLISB to discuss forms needed for WC.     Regina Cash on 5/3/2024 at 12:10 PM

## 2024-05-06 ENCOUNTER — MYC MEDICAL ADVICE (OUTPATIENT)
Dept: FAMILY MEDICINE | Facility: CLINIC | Age: 39
End: 2024-05-06
Payer: COMMERCIAL

## 2024-05-06 NOTE — TELEPHONE ENCOUNTER
Patient called back said that she needs the referral faxed to the WC  so that she can get an appointment with the concussion doctor. She is going to call back with the fax number.   She also stated that she needs a letter for work stating that you is not able to go back to work until her appointment with the concussion doctor.     When completed please contact the patient and let her know

## 2024-05-06 NOTE — TELEPHONE ENCOUNTER
Pt requests concussion referral first be faxed to  [facility].      [facility]  Phone: [phone] x [phone]  Fax #: [phone]    This writer requesting pt complete an MARIUSZ form in order for [place] to send any medical information to [facility].      X Patient emailed MARIUSZ form (emailed to [email or web address]     X Patient MyChart account set-up.   ___________________________    Pt states once  rcvs the referral she can then schedule with the concussion clinic.  Pt needs approval from  prior to scheduling.   ___________________________    Then Aiyana can write the letter that [facility] needs for the WC claim.    Regina Cash on 5/6/2024 at 1:23 PM

## 2024-05-06 NOTE — TELEPHONE ENCOUNTER
She first needs to schedule with concussion clinic, then once scheduled route to me and I will write letter. She can call [phone] to schedule.     Once patient returns call with fax number, please print and fax referral to concussion clinic as requested.    Aiyana Proctor PA-C on 5/6/2024 at 12:53 PM

## 2024-05-08 ENCOUNTER — MYC MEDICAL ADVICE (OUTPATIENT)
Dept: FAMILY MEDICINE | Facility: CLINIC | Age: 39
End: 2024-05-08
Payer: COMMERCIAL

## 2024-05-08 NOTE — TELEPHONE ENCOUNTER
Resent [employer] message with MARIUSZ form and instructions attached.      LVM that this writer resent the MARIUSZ form via [employer].  Requested return asap in order to fax to [facility] workers' .    Regina Cash on 5/8/2024 at 12:15 PM

## 2024-06-12 ENCOUNTER — PATIENT OUTREACH (OUTPATIENT)
Dept: CARE COORDINATION | Facility: CLINIC | Age: 39
End: 2024-06-12
Payer: COMMERCIAL

## 2024-06-25 ENCOUNTER — PATIENT OUTREACH (OUTPATIENT)
Dept: FAMILY MEDICINE | Facility: CLINIC | Age: 39
End: 2024-06-25
Payer: COMMERCIAL

## 2024-06-25 DIAGNOSIS — R87.610 ASCUS OF CERVIX WITH NEGATIVE HIGH RISK HPV: Primary | ICD-10-CM
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
## 2024-06-26 ENCOUNTER — PATIENT OUTREACH (OUTPATIENT)
Dept: CARE COORDINATION | Facility: CLINIC | Age: 39
End: 2024-06-26
Payer: COMMERCIAL

## 2024-07-29 NOTE — TELEPHONE ENCOUNTER
Patient due for Pap and HPV.    Reminder done today via telephone call - recording states: Call cannot be completed at this time, please try again later.

## 2024-07-31 NOTE — TELEPHONE ENCOUNTER
"Patient due for Pap and HPV.    Reminder done today via telephone call.    \"Your call cannot be completed at this time\". Additional reminder letter sent.  "

## 2024-08-31 ENCOUNTER — HEALTH MAINTENANCE LETTER (OUTPATIENT)
Age: 39
End: 2024-08-31

## 2024-09-05 NOTE — TELEPHONE ENCOUNTER
"FYI to provider - Patient is lost to pap tracking follow-up. Attempts to contact pt have been made per reminder process and there has been no reply and/or no appt scheduled. Contact hx listed below.     2005, 2006, 2008, 2010 NIL paps  8/1/19 ASCUS pap, Neg HPV.  Plan: cotest in 3 years  7/12/23 ASCUS, Neg HPV. Plan 1 yr co-test  6/25/24 Reminder MyChart  7/29/24 Reminder call - recording states: call cannot be completed at this time, please try again later  7/31/24 Reminder call -- \"Your call cannot be completed at this time\". Additional reminder letter sent.  9/5/24 Lost to follow-up for pap tracking    "

## 2024-09-11 ENCOUNTER — HOSPITAL ENCOUNTER (EMERGENCY)
Facility: CLINIC | Age: 39
Discharge: HOME | End: 2024-09-11
Attending: EMERGENCY MEDICINE | Admitting: EMERGENCY MEDICINE
Payer: COMMERCIAL

## 2024-09-11 VITALS
RESPIRATION RATE: 18 BRPM | OXYGEN SATURATION: 100 % | WEIGHT: 180 LBS | DIASTOLIC BLOOD PRESSURE: 86 MMHG | SYSTOLIC BLOOD PRESSURE: 117 MMHG | HEART RATE: 77 BPM | TEMPERATURE: 97.8 F | BODY MASS INDEX: 30.73 KG/M2 | HEIGHT: 64 IN

## 2024-09-11 DIAGNOSIS — K64.4 EXTERNAL HEMORRHOIDS: ICD-10-CM

## 2024-09-11 PROCEDURE — 99283 EMERGENCY DEPT VISIT LOW MDM: CPT

## 2024-09-11 RX ORDER — HYDROCORTISONE 25 MG/G
CREAM TOPICAL 2 TIMES DAILY PRN
Qty: 28 G | Refills: 0 | Status: SHIPPED | OUTPATIENT
Start: 2024-09-11

## 2024-09-11 ASSESSMENT — COLUMBIA-SUICIDE SEVERITY RATING SCALE - C-SSRS
1. IN THE PAST MONTH, HAVE YOU WISHED YOU WERE DEAD OR WISHED YOU COULD GO TO SLEEP AND NOT WAKE UP?: NO
2. HAVE YOU ACTUALLY HAD ANY THOUGHTS OF KILLING YOURSELF IN THE PAST MONTH?: NO
6. HAVE YOU EVER DONE ANYTHING, STARTED TO DO ANYTHING, OR PREPARED TO DO ANYTHING TO END YOUR LIFE?: NO

## 2024-09-11 NOTE — ED PROVIDER NOTES
"  Emergency Department Note      History of Present Illness     Chief Complaint   Hemorrhoids (/)      HPI   Ethel Cih is a 39 year old female with a history of external hemorrhoids who presented with concern that her hemorrhoids have recurred.  She states that last year she had them intermittently and was prescribed hydrocortisone cream.  She was also referred to colorectal surgery but given the wait time she decided not to go.  She did say that the cream seem to help significantly.  Recently the pain has come back and she is feeling more pain with having a bowel movement.  She did see some blood when she had a bowel movement today.  She denies any nausea or vomiting.  Denies any other concerns.    Independent Historian   None    Review of External Notes   none    Past Medical History     Medical History and Problem List   Past Medical History:   Diagnosis Date     Early onset menopause        Medications   norethindrone-ethinyl estradiol (DONNY FE 1/20) 1-20 MG-MCG tablet  phentermine 15 MG capsule        Surgical History   Past Surgical History:   Procedure Laterality Date     LAPAROSCOPIC CHOLECYSTECTOMY         Physical Exam     Patient Vitals for the past 24 hrs:   BP Temp Temp src Pulse Resp SpO2 Height Weight   09/11/24 0020 117/86 97.8  F (36.6  C) Temporal 77 18 100 % 1.626 m (5' 4\") 81.6 kg (180 lb)     Physical Exam  Constitutional:       Appearance: She is well-developed.   Cardiovascular:      Rate and Rhythm: Normal rate and regular rhythm.      Heart sounds: Normal heart sounds. No murmur heard.     No friction rub. No gallop.   Pulmonary:      Effort: Pulmonary effort is normal. No respiratory distress.      Breath sounds: Normal breath sounds. No wheezing or rales.   Abdominal:      General: Bowel sounds are normal. There is no distension.      Palpations: Abdomen is soft. There is no mass.      Tenderness: There is no abdominal tenderness.   Genitourinary:     Comments: 2 small external " hemorrhoids.  Smaller hemorrhoid around the 6:00 region seem to have an area of bleeding.  Not bleeding right now.  The hemorrhoids are not thrombosed.  Skin:     General: Skin is warm and dry.   Neurological:      Mental Status: She is alert.           Diagnostics     Lab Results   Labs Ordered and Resulted from Time of ED Arrival to Time of ED Departure - No data to display    Imaging   No orders to display     Independent Interpretation   None    ED Course      Medications Administered   Medications - No data to display    Procedures   Procedures     Discussion of Management   None    ED Course    0030 Exam    Additional Documentation  None    Medical Decision Making / Diagnosis     CMS Diagnoses: None    MIPS       None    MDM   Ethel Chi is a 39 year old female with history of external hemorrhoids who presents with recurrence of these hemorrhoids.  She describes an intermittent bleeding with bowel movement.  No concern for hemorrhaging or large amount of bleeding.  When I did the exam, she requested that we do not have a chaperone in the room even though was a female.  We did discuss with her with these intimate exams, it is policy to have a chaperone in the room.  She adamantly refused have a chaperone and wanted to leave before the exam.  She agreed to have the nurse stand behind a curtain so she can still hears talking but not actually in the room.  Her exam did show a small hemorrhoid that appeared to have been the culprit of bleeding.  No current bleeding.  They are not thrombosed.  I gave her referral to colorectal surgery to have these definitively removed.  She is comfortable with the plan to just increase fiber in to stool softeners so it is better for her to have a bowel movement.  Hydrocortisone cream prescribed as well.  Return precaution provided.    Disposition   The patient was discharged.     Diagnosis     ICD-10-CM    1. External hemorrhoids  K64.4 Adult Colorectal Surgery   Referral - [facility] ([facility]           Discharge Medications   New Prescriptions    HYDROCORTISONE, PERIANAL, (HYDROCORTISONE) 2.5 % CREAM    Place rectally 2 times daily as needed for hemorrhoids.         MD Tim Kyle Wenlan, MD  09/11/24 0059

## 2024-09-11 NOTE — DISCHARGE INSTRUCTIONS
Increase fiber to soften stool  Take stool softeners like colace or senna to make your stool easier to pass  Hydrocortisone cream applied externally  Follow up with colorectal surgeon for removal as needed

## 2024-09-11 NOTE — ED TRIAGE NOTES
Here for concern of hemorrhoid pain x3 days. Took Ibuprofen around 9am which did help a little. ABCs intact.       Triage Assessment (Adult)       Row Name 09/11/24 0018          Triage Assessment    Airway WDL WDL        Respiratory WDL    Respiratory WDL WDL        Cardiac WDL    Cardiac WDL WDL

## 2024-12-17 ENCOUNTER — OFFICE VISIT (OUTPATIENT)
Dept: URGENT CARE | Facility: URGENT CARE | Age: 39
End: 2024-12-17
Payer: COMMERCIAL

## 2024-12-17 ENCOUNTER — ANCILLARY PROCEDURE (OUTPATIENT)
Dept: GENERAL RADIOLOGY | Facility: CLINIC | Age: 39
End: 2024-12-17
Attending: NURSE PRACTITIONER
Payer: COMMERCIAL

## 2024-12-17 VITALS
SYSTOLIC BLOOD PRESSURE: 103 MMHG | RESPIRATION RATE: 20 BRPM | OXYGEN SATURATION: 99 % | BODY MASS INDEX: 32.61 KG/M2 | TEMPERATURE: 98.3 F | HEART RATE: 66 BPM | DIASTOLIC BLOOD PRESSURE: 71 MMHG | WEIGHT: 190 LBS

## 2024-12-17 DIAGNOSIS — R05.9 COUGH, UNSPECIFIED TYPE: ICD-10-CM

## 2024-12-17 DIAGNOSIS — R10.13 EPIGASTRIC PAIN: ICD-10-CM

## 2024-12-17 DIAGNOSIS — K21.00 GASTROESOPHAGEAL REFLUX DISEASE WITH ESOPHAGITIS WITHOUT HEMORRHAGE: Primary | ICD-10-CM

## 2024-12-17 DIAGNOSIS — R50.9 FEVER IN ADULT: ICD-10-CM

## 2024-12-17 DIAGNOSIS — K59.01 SLOW TRANSIT CONSTIPATION: ICD-10-CM

## 2024-12-17 DIAGNOSIS — J06.9 VIRAL URI WITH COUGH: ICD-10-CM

## 2024-12-17 DIAGNOSIS — R07.0 THROAT PAIN: ICD-10-CM

## 2024-12-17 LAB
ALBUMIN SERPL-MCNC: 3.9 G/DL (ref 3.4–5)
ALBUMIN UR-MCNC: NEGATIVE MG/DL
ALP SERPL-CCNC: 73 U/L (ref 40–150)
ALT SERPL W P-5'-P-CCNC: 16 U/L (ref 0–50)
ANION GAP SERPL CALCULATED.3IONS-SCNC: 6 MMOL/L (ref 3–14)
APPEARANCE UR: CLEAR
AST SERPL W P-5'-P-CCNC: 26 U/L (ref 0–45)
BACTERIA #/AREA URNS HPF: ABNORMAL /HPF
BASOPHILS # BLD AUTO: 0 10E3/UL (ref 0–0.2)
BASOPHILS NFR BLD AUTO: 0 %
BILIRUB SERPL-MCNC: 0.8 MG/DL (ref 0.2–1.3)
BILIRUB UR QL STRIP: NEGATIVE
BUN SERPL-MCNC: 9 MG/DL (ref 7–30)
CALCIUM SERPL-MCNC: 9.9 MG/DL (ref 8.5–10.1)
CHLORIDE BLD-SCNC: 108 MMOL/L (ref 94–109)
CO2 SERPL-SCNC: 28 MMOL/L (ref 20–32)
COLOR UR AUTO: YELLOW
CREAT SERPL-MCNC: 0.7 MG/DL (ref 0.52–1.04)
DEPRECATED S PYO AG THROAT QL EIA: NEGATIVE
EGFRCR SERPLBLD CKD-EPI 2021: >90 ML/MIN/1.73M2
EOSINOPHIL # BLD AUTO: 0.1 10E3/UL (ref 0–0.7)
EOSINOPHIL NFR BLD AUTO: 1 %
ERYTHROCYTE [DISTWIDTH] IN BLOOD BY AUTOMATED COUNT: 13.1 % (ref 10–15)
GLUCOSE BLD-MCNC: 97 MG/DL (ref 70–99)
GLUCOSE UR STRIP-MCNC: NEGATIVE MG/DL
HCG UR QL: NEGATIVE
HCT VFR BLD AUTO: 45.3 % (ref 35–47)
HGB BLD-MCNC: 15.3 G/DL (ref 11.7–15.7)
HGB UR QL STRIP: ABNORMAL
IMM GRANULOCYTES # BLD: 0 10E3/UL
IMM GRANULOCYTES NFR BLD: 0 %
KETONES UR STRIP-MCNC: NEGATIVE MG/DL
LEUKOCYTE ESTERASE UR QL STRIP: NEGATIVE
LYMPHOCYTES # BLD AUTO: 3.2 10E3/UL (ref 0.8–5.3)
LYMPHOCYTES NFR BLD AUTO: 32 %
MCH RBC QN AUTO: 29.8 PG (ref 26.5–33)
MCHC RBC AUTO-ENTMCNC: 33.8 G/DL (ref 31.5–36.5)
MCV RBC AUTO: 88 FL (ref 78–100)
MONOCYTES # BLD AUTO: 0.7 10E3/UL (ref 0–1.3)
MONOCYTES NFR BLD AUTO: 7 %
MUCOUS THREADS #/AREA URNS LPF: PRESENT /LPF
NEUTROPHILS # BLD AUTO: 6.1 10E3/UL (ref 1.6–8.3)
NEUTROPHILS NFR BLD AUTO: 60 %
NITRATE UR QL: NEGATIVE
PH UR STRIP: 6 [PH] (ref 5–7)
PLATELET # BLD AUTO: 298 10E3/UL (ref 150–450)
POTASSIUM BLD-SCNC: 4.3 MMOL/L (ref 3.4–5.3)
PROT SERPL-MCNC: 8.3 G/DL (ref 6.8–8.8)
RBC # BLD AUTO: 5.14 10E6/UL (ref 3.8–5.2)
RBC #/AREA URNS AUTO: ABNORMAL /HPF
SARS-COV-2 RNA RESP QL NAA+PROBE: NEGATIVE
SODIUM SERPL-SCNC: 142 MMOL/L (ref 135–145)
SP GR UR STRIP: 1.01 (ref 1–1.03)
SQUAMOUS #/AREA URNS AUTO: ABNORMAL /LPF
UROBILINOGEN UR STRIP-ACNC: 0.2 E.U./DL
WBC # BLD AUTO: 10.2 10E3/UL (ref 4–11)
WBC #/AREA URNS AUTO: ABNORMAL /HPF

## 2024-12-17 PROCEDURE — 85025 COMPLETE CBC W/AUTO DIFF WBC: CPT | Performed by: NURSE PRACTITIONER

## 2024-12-17 PROCEDURE — 87635 SARS-COV-2 COVID-19 AMP PRB: CPT | Performed by: NURSE PRACTITIONER

## 2024-12-17 PROCEDURE — 99214 OFFICE O/P EST MOD 30 MIN: CPT | Performed by: NURSE PRACTITIONER

## 2024-12-17 PROCEDURE — 74019 RADEX ABDOMEN 2 VIEWS: CPT | Mod: TC | Performed by: RADIOLOGY

## 2024-12-17 PROCEDURE — 87651 STREP A DNA AMP PROBE: CPT | Performed by: NURSE PRACTITIONER

## 2024-12-17 PROCEDURE — 36415 COLL VENOUS BLD VENIPUNCTURE: CPT | Performed by: NURSE PRACTITIONER

## 2024-12-17 PROCEDURE — 81025 URINE PREGNANCY TEST: CPT | Performed by: NURSE PRACTITIONER

## 2024-12-17 PROCEDURE — 81001 URINALYSIS AUTO W/SCOPE: CPT | Performed by: NURSE PRACTITIONER

## 2024-12-17 PROCEDURE — 80053 COMPREHEN METABOLIC PANEL: CPT | Performed by: NURSE PRACTITIONER

## 2024-12-17 PROCEDURE — 93000 ELECTROCARDIOGRAM COMPLETE: CPT | Performed by: NURSE PRACTITIONER

## 2024-12-17 RX ORDER — ALUMINA, MAGNESIA, AND SIMETHICONE 2400; 2400; 240 MG/30ML; MG/30ML; MG/30ML
15 SUSPENSION ORAL EVERY 4 HOURS PRN
Qty: 355 ML | Refills: 0 | Status: SHIPPED | OUTPATIENT
Start: 2024-12-17 | End: 2025-01-16

## 2024-12-17 RX ORDER — PANTOPRAZOLE SODIUM 40 MG/1
40 TABLET, DELAYED RELEASE ORAL DAILY
Qty: 60 TABLET | Refills: 0 | Status: SHIPPED | OUTPATIENT
Start: 2024-12-17 | End: 2025-02-15

## 2024-12-17 RX ORDER — POLYETHYLENE GLYCOL 3350 17 G/17G
1 POWDER, FOR SOLUTION ORAL DAILY
Qty: 510 G | Refills: 0 | Status: SHIPPED | OUTPATIENT
Start: 2024-12-17 | End: 2025-01-16

## 2024-12-17 NOTE — LETTER
December 17, 2024      Ethel Chi  [address] [id number]        To Whom It May Concern:    Ethel Chi  was seen on 12/17/2024.  Please excuse her  until 12/20/2024 due to illness.        Sincerely,        NATALIIA HILARIO, CNP

## 2024-12-17 NOTE — PROGRESS NOTES
Chief Complaint   Patient presents with    Cough    Gastrointestinal Problem     GERD    Pharyngitis         ICD-10-CM    1. Gastroesophageal reflux disease with esophagitis without hemorrhage  K21.00 pantoprazole (PROTONIX) 40 MG EC tablet     alum & mag hydroxide-simethicone (MAALOX MAX) 400-400-40 MG/5ML SUSP suspension      2. Throat pain  R07.0 Streptococcus A Rapid Screen w/Reflex to PCR - Clinic Collect     Group A Streptococcus PCR Throat Swab      3. Cough, unspecified type  R05.9 COVID-19 Virus (Coronavirus) by PCR Nasopharyngeal      4. Viral URI with cough  J06.9       5. Epigastric pain  R10.13 CBC with platelets and differential     XR Abdomen 2 Views     Comprehensive metabolic panel (BMP + Alb, Alk Phos, ALT, AST, Total. Bili, TP)     EKG 12-lead complete w/read - Clinics     HCG qualitative urine     UA Macroscopic with reflex to Microscopic and Culture - Clinic Collect     lidocaine (viscous) (XYLOCAINE) 2 % 15 mL, alum & mag hydroxide-simethicone (MAALOX) 15 mL GI Cocktail     HCG qualitative urine     CBC with platelets and differential     UA Microscopic with Reflex to Culture     alum & mag hydroxide-simethicone (MAALOX MAX) 400-400-40 MG/5ML SUSP suspension      GI cocktail completely to pain away.  Will start her on pantoprazole and she may use Maalox as needed.  Follow-up with primary care.    Tylenol as needed for fever, would avoid ibuprofen.  Await results of PCR testing for throat.    Red flag warning signs and when to go to the emergency room discussed.  Reviewed potential adverse reactions to medications.        Medical Decision Making    A differential diagnosis for the abdominal pain includes but is not limited to: Gastritis, gastroenteritis, enteritis, colitis, irritable bowel disease, inflammatory bowel disease, appendicitis, viral infection, diverticulitis, ileus, bowel obstruction, volvulus, ileus, intussusception, gas pains, indigestion, gastroesophageal reflux, aortic  pathology, urinary tract infection, pyelonephritis, cystitis, gastric ulcer, duodenal ulcer, viscus perforation, mesenteric ischemia, Crohn's disease, abdominal aortic dissection, ulcerative colitis, renal stone, biliary colic, cholecystitis, pancreatitis, abdominal hernia, internal hernia.     Xray - Reviewed and interpreted by me.  Abdominal x-ray shows no obstructions, free air, dilated loops of bowel but there is a moderate amount of stool throughout colon.    Electrocardiogram shows normal sinus rhythm with no acute changes as read by this provider.    Results for orders placed or performed in visit on 12/17/24 (from the past 24 hours)   Streptococcus A Rapid Screen w/Reflex to PCR - Clinic Collect    Specimen: Throat; Swab   Result Value Ref Range    Group A Strep antigen Negative Negative   UA Macroscopic with reflex to Microscopic and Culture - Clinic Collect    Specimen: Urine, Midstream   Result Value Ref Range    Color Urine Yellow Colorless, Straw, Light Yellow, Yellow    Appearance Urine Clear Clear    Glucose Urine Negative Negative mg/dL    Bilirubin Urine Negative Negative    Ketones Urine Negative Negative mg/dL    Specific Gravity Urine 1.015 1.003 - 1.035    Blood Urine Trace (A) Negative    pH Urine 6.0 5.0 - 7.0    Protein Albumin Urine Negative Negative mg/dL    Urobilinogen Urine 0.2 0.2, 1.0 E.U./dL    Nitrite Urine Negative Negative    Leukocyte Esterase Urine Negative Negative   HCG qualitative urine   Result Value Ref Range    hCG Urine Qualitative Negative Negative   UA Microscopic with Reflex to Culture   Result Value Ref Range    Bacteria Urine Few (A) None Seen /HPF    RBC Urine 0-2 0-2 /HPF /HPF    WBC Urine 0-5 0-5 /HPF /HPF    Squamous Epithelials Urine Few (A) None Seen /LPF    Mucus Urine Present (A) None Seen /LPF    Narrative    Urine Culture not indicated   Comprehensive metabolic panel (BMP + Alb, Alk Phos, ALT, AST, Total. Bili, TP)   Result Value Ref Range    Sodium 142 135  - 145 mmol/L    Potassium 4.3 3.4 - 5.3 mmol/L    Chloride 108 94 - 109 mmol/L    Carbon Dioxide (CO2) 28 20 - 32 mmol/L    Anion Gap 6 3 - 14 mmol/L    Urea Nitrogen 9 7 - 30 mg/dL    Creatinine 0.70 0.52 - 1.04 mg/dL    GFR Estimate >90 >60 mL/min/1.73m2    Calcium 9.9 8.5 - 10.1 mg/dL    Glucose 97 70 - 99 mg/dL    Alkaline Phosphatase 73 40 - 150 U/L    AST 26 0 - 45 U/L    ALT 16 0 - 50 U/L    Protein Total 8.3 6.8 - 8.8 g/dL    Albumin 3.9 3.4 - 5.0 g/dL    Bilirubin Total 0.8 0.2 - 1.3 mg/dL   CBC with platelets and differential    Narrative    The following orders were created for panel order CBC with platelets and differential.  Procedure                               Abnormality         Status                     ---------                               -----------         ------                     CBC with platelets and d...[[id number]]                      Final result                 Please view results for these tests on the individual orders.   CBC with platelets and differential   Result Value Ref Range    WBC Count 10.2 4.0 - 11.0 10e3/uL    RBC Count 5.14 3.80 - 5.20 10e6/uL    Hemoglobin 15.3 11.7 - 15.7 g/dL    Hematocrit 45.3 35.0 - 47.0 %    MCV 88 78 - 100 fL    MCH 29.8 26.5 - 33.0 pg    MCHC 33.8 31.5 - 36.5 g/dL    RDW 13.1 10.0 - 15.0 %    Platelet Count 298 150 - 450 10e3/uL    % Neutrophils 60 %    % Lymphocytes 32 %    % Monocytes 7 %    % Eosinophils 1 %    % Basophils 0 %    % Immature Granulocytes 0 %    Absolute Neutrophils 6.1 1.6 - 8.3 10e3/uL    Absolute Lymphocytes 3.2 0.8 - 5.3 10e3/uL    Absolute Monocytes 0.7 0.0 - 1.3 10e3/uL    Absolute Eosinophils 0.1 0.0 - 0.7 10e3/uL    Absolute Basophils 0.0 0.0 - 0.2 10e3/uL    Absolute Immature Granulocytes 0.0 <=0.4 10e3/uL   XR Abdomen 2 Views    Narrative    EXAM: XR ABDOMEN 2 VIEWS  LOCATION: [facility]  DATE: 12/17/2024    INDICATION:  Epigastric pain  COMPARISON: None.      Impression    IMPRESSION:  Normal bowel gas pattern. No ileus or free air. Surgical clips right upper quadrant most typical for cholecystectomy. Mild lumbar spinal curvature.       Subjective     Ethel Chi is an 39 year old female who presents to clinic today for epigastric pain intermittently for 2 weeks, nearly constant for the last 3 days.  She has also had a fever and sore throat, slight runny nose.  She reports the epigastric pain is worse at night describes it as a burning sensation.    ROS: 10 point ROS neg other than the symptoms noted above in the HPI.       Objective    /71   Pulse 66   Temp 98.3  F (36.8  C)   Resp 20   Wt 86.2 kg (190 lb)   LMP  (LMP Unknown)   SpO2 99%   BMI 32.61 kg/m    Nurses notes and VS have been reviewed.    Physical Exam       GENERAL APPEARANCE: alert and mild distress     EYES: PERRL, EOMI, sclera non-icteric     HENT: Bilateral tympanic membranes and canals appear normal, nasal passages are clear, mild pharyngeal erythema is noted     NECK: no adenopathy or asymmetry, thyroid normal to palpation     RESP: lungs clear to auscultation - no rales, rhonchi or wheezes     CV: regular rates and rhythm, no murmurs, rubs, or gallop     ABDOMEN: Soft, mild tenderness in the epigastric area, normal bowel sounds, exam is limited by body habitus     MS: extremities normal- no gross deformities noted; normal muscle tone.     SKIN: no suspicious lesions or rashes     NEURO: Normal strength and tone, mentation intact and speech normal      ROSEMARY Robles, CNP  [facility] Provider    The use of Dragon/Friendster dictation services may have been used to construct the content in this note; any grammatical or spelling errors are non-intentional. Please contact the author of this note directly if you are in need of any clarification.

## 2024-12-18 LAB — S PYO DNA THROAT QL NAA+PROBE: NOT DETECTED

## 2024-12-18 NOTE — PATIENT INSTRUCTIONS
Start taking pantoprazole daily as prescribed.    You may use aluminum and magnesium hydroxide with simethicone 1 tablespoon every 4 hours as needed for stomach discomfort.  You must still take the pantoprazole and not just this medication.    If your symptoms fail to improve please make follow-up appointment with primary care to discuss further testing.    Viral Respiratory Infection: Care Instructions  Overview     A viral respiratory infection is an infection of the nose, sinuses, or throat caused by a virus. Colds and the flu are common types of viral respiratory infections.  The symptoms of a viral respiratory infection often start quickly. They include a fever, sore throat, and runny nose. You may also just not feel well. Or you may not want to eat much.  Most viral infections can be treated with home care. This may include drinking lots of fluids and taking over-the-counter pain medicine. You will probably feel better in 4 to 10 days.  Antibiotics are not used to treat a viral infection. Antibiotics don't kill viruses, so they won't help cure a viral illness.  In some cases, a doctor may prescribe antiviral medicine to help your body fight a serious viral infection.  Follow-up care is a key part of your treatment and safety. Be sure to make and go to all appointments, and call your doctor if you are having problems. It's also a good idea to know your test results and keep a list of the medicines you take.  How can you care for yourself at home?  To prevent dehydration, drink plenty of fluids. Choose water and other clear liquids until you feel better. If you have kidney, heart, or liver disease and have to limit fluids, talk with your doctor before you increase the amount of fluids you drink.  Ask your doctor if you can take an over-the-counter pain medicine, such as acetaminophen (Tylenol), ibuprofen (Advil, Motrin), or naproxen (Aleve). Be safe with medicines. Read and follow all instructions on the label.  No one younger than 20 should take aspirin. It has been linked to Reye syndrome, a serious illness.  Be careful when taking over-the-counter cold or flu medicines and Tylenol at the same time. Many of these medicines have acetaminophen, which is Tylenol. Read the labels to make sure that you are not taking more than the recommended dose. Too much acetaminophen (Tylenol) can be harmful.  Get plenty of rest.  Use saline (saltwater) nasal washes to help keep your nasal passages open and wash out mucus and allergens. You can buy saline nose sprays at a grocery store or drugstore. Follow the instructions on the package. Or you can make your own at home. Add 1 teaspoon of non-iodized salt and 1 teaspoon of baking soda to 2 cups of distilled or boiled and cooled water. Fill a squeeze bottle or neti pot with the nasal wash. Then put the tip into your nostril, and lean over the sink. With your mouth open, gently squirt the liquid. Repeat on the other side.  Use a vaporizer or humidifier to add moisture to your bedroom. Follow the instructions for cleaning the machine.  Do not smoke or allow others to smoke around you. If you need help quitting, talk to your doctor about stop-smoking programs and medicines. These can increase your chances of quitting for good.  When should you call for help?   Call 911 anytime you think you may need emergency care. For example, call if:    You have severe trouble breathing.   Call your doctor now or seek immediate medical care if:    You have a new or higher fever.     Your fever lasts more than 48 hours.     You have trouble breathing.     You have a fever with a stiff neck or a severe headache.     You are sensitive to light.     You feel very sleepy or confused.   Watch closely for changes in your health, and be sure to contact your doctor if:    You do not get better as expected.

## 2025-01-08 ENCOUNTER — PATIENT OUTREACH (OUTPATIENT)
Dept: CARE COORDINATION | Facility: CLINIC | Age: 40
End: 2025-01-08
Payer: COMMERCIAL

## 2025-03-02 ENCOUNTER — HEALTH MAINTENANCE LETTER (OUTPATIENT)
Age: 40
End: 2025-03-02

## 2025-03-12 NOTE — ED NOTES
"Patient presents with complaints of \"something cold moving\" inside of left side of head. Patient also states that she has been having intermittent headaches since yesterday as well. Patient has been taking ibuprofen for pain. Patient also states she has been losing hair in the shower as well.  ABC intact without need for intervention.   " Paperwork signed by provider, scanned back to Disability Department.

## (undated) RX ORDER — ACETAMINOPHEN 325 MG/1
TABLET ORAL
Status: DISPENSED
Start: 2017-02-07